# Patient Record
Sex: MALE | Race: BLACK OR AFRICAN AMERICAN | NOT HISPANIC OR LATINO | Employment: OTHER | URBAN - METROPOLITAN AREA
[De-identification: names, ages, dates, MRNs, and addresses within clinical notes are randomized per-mention and may not be internally consistent; named-entity substitution may affect disease eponyms.]

---

## 2017-03-09 DIAGNOSIS — F51.01 PRIMARY INSOMNIA: ICD-10-CM

## 2017-03-09 NOTE — TELEPHONE ENCOUNTER
Routing refill request to provider for review/approval because:  Labs not current:      Yumiko Garza RN  [facility]  [phone]

## 2017-03-09 NOTE — LETTER
[facility]  [address] [address]  [phone]        April 6, 2017  Ed Rudolph  [address] [address]    Dear Ed,    I care about your health and have reviewed your health plan. I have reviewed your medical conditions, medication list, and lab results and am making recommendations based on this review, to better manage your health.    You are in particular need of attention regarding:  -Wellness (Physical) Visit     I am recommending that you:  -schedule a WELLNESS (Physical) APPOINTMENT with me.   I will check fasting labs the same day - nothing to eat except water and meds for 8-10 hours prior.    Here is a list of Health Maintenance topics that are due now or due soon:  Health Maintenance Due   Topic Date Due     Discuss Advance Directive Planning  05/07/1979     Colon Cancer Screening - every 10 years.  05/07/2011     Cholesterol Lab - yearly  02/10/2016     Prostate Test (PSA) - every 2 years  04/16/2016     Comprehensive Metabolic Lab - yearly  12/21/2016       Please call us at [phone] (or use [employer] to address the above recommendations.     Thank you for trusting [facility] and we appreciate the opportunity to serve you.  We look forward to supporting your healthcare needs in the future.    Healthy Regards,    Kedar Pierce PA-C

## 2017-03-09 NOTE — TELEPHONE ENCOUNTER
Routing to team to inform and assist in scheduling.   Debora Erazo RN   [facility] - Triage

## 2017-03-09 NOTE — TELEPHONE ENCOUNTER
Trazodone       Last Written Prescription Date: 3/28/16  Last Fill Quantity: 90; # refills: 1  Last Office Visit with [facility], [facility] or  Health prescribing provider:  3/28/16        Last PHQ-9 score on record=   PHQ-9 SCORE 1/22/2016   Total Score 3       Lab Results   Component Value Date    AST 38 12/21/2015     Lab Results   Component Value Date    ALT 60 12/21/2015     Bethany Salazar CMA

## 2017-04-06 RX ORDER — TRAZODONE HYDROCHLORIDE 50 MG/1
TABLET, FILM COATED ORAL
Qty: 90 TABLET | Refills: 0 | OUTPATIENT
Start: 2017-04-06

## 2017-04-08 ENCOUNTER — OFFICE VISIT (OUTPATIENT)
Dept: URGENT CARE | Facility: URGENT CARE | Age: 57
End: 2017-04-08
Payer: COMMERCIAL

## 2017-04-08 VITALS
HEART RATE: 95 BPM | OXYGEN SATURATION: 96 % | SYSTOLIC BLOOD PRESSURE: 117 MMHG | WEIGHT: 191 LBS | TEMPERATURE: 98.2 F | BODY MASS INDEX: 27.41 KG/M2 | DIASTOLIC BLOOD PRESSURE: 73 MMHG

## 2017-04-08 DIAGNOSIS — J20.9 ACUTE BRONCHITIS, UNSPECIFIED ORGANISM: ICD-10-CM

## 2017-04-08 DIAGNOSIS — R05.9 COUGH: Primary | ICD-10-CM

## 2017-04-08 LAB
FLUAV+FLUBV AG SPEC QL: NEGATIVE
FLUAV+FLUBV AG SPEC QL: NORMAL
SPECIMEN SOURCE: NORMAL

## 2017-04-08 PROCEDURE — 99213 OFFICE O/P EST LOW 20 MIN: CPT | Performed by: FAMILY MEDICINE

## 2017-04-08 PROCEDURE — 87804 INFLUENZA ASSAY W/OPTIC: CPT | Performed by: FAMILY MEDICINE

## 2017-04-08 RX ORDER — AMOXICILLIN 500 MG/1
1000 CAPSULE ORAL 2 TIMES DAILY
Qty: 40 CAPSULE | Refills: 0 | Status: SHIPPED | OUTPATIENT
Start: 2017-04-08 | End: 2017-04-18

## 2017-04-08 NOTE — PROGRESS NOTES
SUBJECTIVE:   Ed Rudolph is a 56 year old male presenting with a chief complaint of fever, cough  and sore throat.  Onset of symptoms was 4 day(s) ago.  Course of illness is worsening.    Severity moderate  Current and Associated symptoms: fever, cough  and sore throat  Treatment measures tried include OTC meds.  Predisposing factors include None.    Past Medical History:   Diagnosis Date     Carpal tunnel syndrome 1/5/2007     Depressive disorder      Erectile Dysfunction 1/5/2007     Hyperlipidemia LDL goal <130 10/31/2010     Hypertension goal BP (blood pressure) < 140/90 5/11/2011     Impaired fasting glucose      LEFT FRONTAL LOBE ABSCESS 1/03    treated surgically, resolved     Current Outpatient Prescriptions   Medication Sig Dispense Refill     simvastatin (ZOCOR) 40 MG tablet Take 1 tablet (40 mg) by mouth At Bedtime 30 tablet 1     lisinopril-hydrochlorothiazide (PRINZIDE,ZESTORETIC) 20-25 MG per tablet Take 1 tablet by mouth daily 90 tablet 3     Multiple Vitamin (DAILY VITAMINS PO) Take 1 tablet by mouth daily        traZODone (DESYREL) 50 MG tablet Take 1 tablet (50 mg) by mouth At Bedtime (Patient not taking: Reported on 4/8/2017) 90 tablet 1     Social History   Substance Use Topics     Smoking status: Never Smoker     Smokeless tobacco: Never Used     Alcohol use 0.0 oz/week     0 Standard drinks or equivalent per week      Comment: currently drionking about a pint of vodka every day       ROS:  Review of systems negative except as stated above.    OBJECTIVE  :/73 (BP Location: Left arm, Patient Position: Chair, Cuff Size: Adult Large)  Pulse 95  Temp 98.2  F (36.8  C) (Oral)  Wt 191 lb (86.6 kg)  SpO2 96%  BMI 27.41 kg/m2  GENERAL APPEARANCE: healthy, alert and no distress  EYES: EOMI,  PERRL, conjunctiva clear  HENT: ear canals and TM's normal.  Nose and mouth without ulcers, erythema or lesions  NECK: supple, nontender, no lymphadenopathy  RESP: lungs clear to auscultation - no  rales, rhonchi or wheezes  NEURO: Normal strength and tone, sensory exam grossly normal,  normal speech and mentation  SKIN: no suspicious lesions or rashes    ASSESSMENT:  Bronchitis    PLAN:  Rx amox  See orders in Epic

## 2017-04-08 NOTE — NURSING NOTE
"Chief Complaint   Patient presents with     Cough     Pt c/o cough, fever (did not take), shakes, loss of energy, loss of appetite.        Initial /73 (BP Location: Left arm, Patient Position: Chair, Cuff Size: Adult Large)  Pulse 95  Temp 98.2  F (36.8  C) (Oral)  Wt 191 lb (86.6 kg)  SpO2 96%  BMI 27.41 kg/m2 Estimated body mass index is 27.41 kg/(m^2) as calculated from the following:    Height as of 5/2/16: 5' 10\" (1.778 m).    Weight as of this encounter: 191 lb (86.6 kg).  Medication Reconciliation: complete     Hiwot Mcnamara CMA (AAMA)      "

## 2017-04-08 NOTE — LETTER
[facility]  [address]  [place] [address]  [phone]      4/8/2017    Re: Ed Rudolph      TO WHOM IT MAY CONCERN:    Ed Rudolph  was seen on 04/08/2017.  Please excuse him 4/5/17 to 4/10/17 due to illness.    Cordially    Delvin Martin MD  [facility]

## 2017-04-08 NOTE — MR AVS SNAPSHOT
After Visit Summary   4/8/2017    Ed Rudolph    MRN: [id number]           Patient Information     Date Of Birth          1960        Visit Information        Provider Department      4/8/2017 9:10 AM Delvin Martin MD [facility]        Today's Diagnoses     Cough    -  1    Acute bronchitis, unspecified organism          Care Instructions      Bronchitis, Antibiotic Treatment (Adult)    Bronchitis is an infection of the air passages (bronchial tubes) in your lungs. It often occurs when you have a cold. This illness is contagious during the first few days and is spread through the air by coughing and sneezing, or by direct contact (touching the sick person and then touching your own eyes, nose, or mouth).  Symptoms of bronchitis include cough with mucus (phlegm) and low-grade fever. Bronchitis usually lasts 7 to 14 days. Mild cases can be treated with simple home remedies. More severe infection is treated with an antibiotic.  Home care  Follow these guidelines when caring for yourself at home:    If your symptoms are severe, rest at home for the first 2 to 3 days. When you go back to your usual activities, don't let yourself get too tired.    Do not smoke. Also avoid being exposed to secondhand smoke.    You may use over-the-counter medicines to control fever or pain, unless another medicine was prescribed. (Note: If you have chronic liver or kidney disease or have ever had a stomach ulcer or gastrointestinal bleeding, talk with your healthcare provider before using these medicines. Also talk to your provider if you are taking medicine to prevent blood clots.) Aspirin should never be given to anyone younger than 18 years of age who is ill with a viral infection or fever. It may cause severe liver or brain damage.    Your appetite may be poor, so a light diet is fine. Avoid dehydration by drinking 6 to 8 glasses of fluids per day (such as water, soft drinks, sports  drinks, juices, tea, or soup). Extra fluids will help loosen secretions in the nose and lungs.    Over-the-counter cough, cold, and sore-throat medicines will not shorten the length of the illness, but they may be helpful to reduce symptoms. (Note: Do not use decongestants if you have high blood pressure.)    Finish all antibiotic medicine. Do this even if you are feeling better after only a few days.  Follow-up care  Follow up with your healthcare provider, or as advised. If you had an X-ray or ECG (electrocardiogram), a specialist will review it. You will be notified of any new findings that may affect your care.  Note: If you are age 65 or older, or if you have a chronic lung disease or condition that affects your immune system, or you smoke, talk to your healthcare provider about having pneumococcal vaccinations and a yearly influenza vaccination (flu shot).  When to seek medical advice  Call your healthcare provider right away if any of these occur:    Fever of 100.4 F (38 C) or higher    Coughing up increased amounts of colored sputum    Weakness, drowsiness, headache, facial pain, ear pain, or a stiff neck   Call 911, or get immediate medical care  Contact emergency services right away if any of these occur.    Coughing up blood    Worsening weakness, drowsiness, headache, or stiff neck    Trouble breathing, wheezing, or pain with breathing    5142-2696 [employer]. [address] [place] [address]. All rights reserved. This information is not intended as a substitute for professional medical care. Always follow your healthcare professional's instructions.              Follow-ups after your visit        Your next 10 appointments already scheduled     Apr 10, 2017  4:20 PM CDT   Office Visit with Mariela Lyons MD   [facility] ([facility])    [address] [place] [address] [phone]           Bring a current list of  meds and any records pertaining to this visit.  For Physicals, please bring immunization records and any forms needing to be filled out.  Please arrive 10 minutes early to complete paperwork.              Who to contact     If you have questions or need follow up information about today's clinic visit or your schedule please contact [facility] [place] directly at [phone].  Normal or non-critical lab and imaging results will be communicated to you by [employer], letter or phone within 4 business days after the clinic has received the results. If you do not hear from us within 7 days, please contact the clinic through [employer] or phone. If you have a critical or abnormal lab result, we will notify you by phone as soon as possible.  Submit refill requests through [employer] or call your pharmacy and they will forward the refill request to us. Please allow 3 business days for your refill to be completed.          Additional Information About Your Visit        [employer] Information     [employer] gives you secure access to your electronic health record. If you see a primary care provider, you can also send messages to your care team and make appointments. If you have questions, please call your primary care clinic.  If you do not have a primary care provider, please call [phone] and they will assist you.        Care EveryWhere ID     This is your Care EveryWhere ID. This could be used by other organizations to access your [facility] medical records  [id number]        Your Vitals Were     Pulse Temperature Pulse Oximetry BMI (Body Mass Index)          95 98.2  F (36.8  C) (Oral) 96% 27.41 kg/m2         Blood Pressure from Last 3 Encounters:   04/08/17 117/73   05/02/16 117/75   03/28/16 122/84    Weight from Last 3 Encounters:   04/08/17 191 lb (86.6 kg)   05/02/16 207 lb (93.9 kg)   03/28/16 207 lb (93.9 kg)              We Performed the Following     Influenza A/B antigen          Today's Medication Changes           These changes are accurate as of: 4/8/17 10:26 AM.  If you have any questions, ask your nurse or doctor.               Start taking these medicines.        Dose/Directions    amoxicillin 500 MG capsule   Commonly known as:  AMOXIL   Used for:  Acute bronchitis, unspecified organism   Started by:  Delvin Martin MD        Dose:  1000 mg   Take 2 capsules (1,000 mg) by mouth 2 times daily for 10 days   Quantity:  40 capsule   Refills:  0            Where to get your medicines      These medications were sent to [facility] [address] [facility] [place], [place] - [address]  [address] [place] [address]     Phone:  [phone]     amoxicillin 500 MG capsule                Primary Care Provider Office Phone #    [facility] [phone]       No address on file        Thank you!     Thank you for choosing [facility]  for your care. Our goal is always to provide you with excellent care. Hearing back from our patients is one way we can continue to improve our services. Please take a few minutes to complete the written survey that you may receive in the mail after your visit with us. Thank you!             Your Updated Medication List - Protect others around you: Learn how to safely use, store and throw away your medicines at www.disposemymeds.org.          This list is accurate as of: 4/8/17 10:26 AM.  Always use your most recent med list.                   Brand Name Dispense Instructions for use    amoxicillin 500 MG capsule    AMOXIL    40 capsule    Take 2 capsules (1,000 mg) by mouth 2 times daily for 10 days       DAILY VITAMINS PO      Take 1 tablet by mouth daily       lisinopril-hydrochlorothiazide 20-25 MG per tablet    PRINZIDE/ZESTORETIC    90 tablet    Take 1 tablet by mouth daily       simvastatin 40 MG tablet    ZOCOR    30 tablet    Take 1 tablet (40 mg) by mouth At Bedtime       traZODone 50 MG tablet    DESYREL    90 tablet    Take 1 tablet (50 mg) by  mouth At Bedtime

## 2017-04-08 NOTE — PATIENT INSTRUCTIONS
Bronchitis, Antibiotic Treatment (Adult)    Bronchitis is an infection of the air passages (bronchial tubes) in your lungs. It often occurs when you have a cold. This illness is contagious during the first few days and is spread through the air by coughing and sneezing, or by direct contact (touching the sick person and then touching your own eyes, nose, or mouth).  Symptoms of bronchitis include cough with mucus (phlegm) and low-grade fever. Bronchitis usually lasts 7 to 14 days. Mild cases can be treated with simple home remedies. More severe infection is treated with an antibiotic.  Home care  Follow these guidelines when caring for yourself at home:    If your symptoms are severe, rest at home for the first 2 to 3 days. When you go back to your usual activities, don't let yourself get too tired.    Do not smoke. Also avoid being exposed to secondhand smoke.    You may use over-the-counter medicines to control fever or pain, unless another medicine was prescribed. (Note: If you have chronic liver or kidney disease or have ever had a stomach ulcer or gastrointestinal bleeding, talk with your healthcare provider before using these medicines. Also talk to your provider if you are taking medicine to prevent blood clots.) Aspirin should never be given to anyone younger than 18 years of age who is ill with a viral infection or fever. It may cause severe liver or brain damage.    Your appetite may be poor, so a light diet is fine. Avoid dehydration by drinking 6 to 8 glasses of fluids per day (such as water, soft drinks, sports drinks, juices, tea, or soup). Extra fluids will help loosen secretions in the nose and lungs.    Over-the-counter cough, cold, and sore-throat medicines will not shorten the length of the illness, but they may be helpful to reduce symptoms. (Note: Do not use decongestants if you have high blood pressure.)    Finish all antibiotic medicine. Do this even if you are feeling better after only a  few days.  Follow-up care  Follow up with your healthcare provider, or as advised. If you had an X-ray or ECG (electrocardiogram), a specialist will review it. You will be notified of any new findings that may affect your care.  Note: If you are age 65 or older, or if you have a chronic lung disease or condition that affects your immune system, or you smoke, talk to your healthcare provider about having pneumococcal vaccinations and a yearly influenza vaccination (flu shot).  When to seek medical advice  Call your healthcare provider right away if any of these occur:    Fever of 100.4 F (38 C) or higher    Coughing up increased amounts of colored sputum    Weakness, drowsiness, headache, facial pain, ear pain, or a stiff neck   Call 911, or get immediate medical care  Contact emergency services right away if any of these occur.    Coughing up blood    Worsening weakness, drowsiness, headache, or stiff neck    Trouble breathing, wheezing, or pain with breathing    0904-4192 [employer]. [address] [place] [address], [place] [address]. All rights reserved. This information is not intended as a substitute for professional medical care. Always follow your healthcare professional's instructions.

## 2017-06-05 DIAGNOSIS — I10 HYPERTENSION GOAL BP (BLOOD PRESSURE) < 140/90: ICD-10-CM

## 2017-06-05 RX ORDER — LISINOPRIL AND HYDROCHLOROTHIAZIDE 20; 25 MG/1; MG/1
1 TABLET ORAL DAILY
Qty: 30 TABLET | Refills: 0 | Status: SHIPPED | OUTPATIENT
Start: 2017-06-05 | End: 2017-07-27

## 2017-06-05 NOTE — TELEPHONE ENCOUNTER
lisinopril  Last Written Prescription Date: 3/15/2017  Last Fill Quantity: 90, # refills: 3  Last Office Visit with [facility], [facility] or [facility] prescribing provider: 3/28/2016  Next 5 appointments (look out 90 days)     Ric 15, 2017  6:00 PM CDT   Office Visit with Kedar Pierce PA-C   [facility] ([facility]    [address] [place] [id number] [phone]                   Potassium   Date Value Ref Range Status   12/22/2015 4.0 3.4 - 5.3 mmol/L Final     Creatinine   Date Value Ref Range Status   12/22/2015 0.85 0.66 - 1.25 mg/dL Final     BP Readings from Last 3 Encounters:   04/08/17 117/73   05/02/16 117/75   03/28/16 122/84

## 2017-07-03 DIAGNOSIS — I10 HYPERTENSION GOAL BP (BLOOD PRESSURE) < 140/90: ICD-10-CM

## 2017-07-03 NOTE — TELEPHONE ENCOUNTER
Prinzide      Last Written Prescription Date: 6/5/17  Last Fill Quantity: 30, # refills: 0  Last Office Visit with [facility] or [facility] prescribing provider: 3/28/16       Potassium   Date Value Ref Range Status   12/22/2015 4.0 3.4 - 5.3 mmol/L Final     Creatinine   Date Value Ref Range Status   12/22/2015 0.85 0.66 - 1.25 mg/dL Final     BP Readings from Last 3 Encounters:   04/08/17 117/73   05/02/16 117/75   03/28/16 122/84     Bethany Salazar CMA

## 2017-07-05 RX ORDER — LISINOPRIL AND HYDROCHLOROTHIAZIDE 20; 25 MG/1; MG/1
TABLET ORAL
Qty: 30 TABLET | Refills: 0 | OUTPATIENT
Start: 2017-07-05

## 2017-07-05 NOTE — TELEPHONE ENCOUNTER
Routing refill request to provider for review/approval because:  Lyssa given x1 and patient did not follow up, please advise  Patient needs to be seen because it has been more than 1 year since last office visit.  Routed to both provider and TC.  Radha Blankenship RN

## 2017-07-27 ENCOUNTER — OFFICE VISIT (OUTPATIENT)
Dept: FAMILY MEDICINE | Facility: CLINIC | Age: 57
End: 2017-07-27
Payer: COMMERCIAL

## 2017-07-27 VITALS
HEIGHT: 70 IN | WEIGHT: 201 LBS | OXYGEN SATURATION: 95 % | DIASTOLIC BLOOD PRESSURE: 83 MMHG | SYSTOLIC BLOOD PRESSURE: 133 MMHG | HEART RATE: 91 BPM | TEMPERATURE: 98.6 F | BODY MASS INDEX: 28.77 KG/M2

## 2017-07-27 DIAGNOSIS — E78.5 HYPERLIPIDEMIA LDL GOAL <130: ICD-10-CM

## 2017-07-27 DIAGNOSIS — F33.0 MAJOR DEPRESSIVE DISORDER, RECURRENT EPISODE, MILD (H): ICD-10-CM

## 2017-07-27 DIAGNOSIS — M79.89 FOOT SWELLING: Primary | ICD-10-CM

## 2017-07-27 DIAGNOSIS — F51.01 PRIMARY INSOMNIA: ICD-10-CM

## 2017-07-27 DIAGNOSIS — Z12.11 SCREEN FOR COLON CANCER: ICD-10-CM

## 2017-07-27 DIAGNOSIS — N40.2 PROSTATIC NODULE: ICD-10-CM

## 2017-07-27 DIAGNOSIS — I10 ESSENTIAL HYPERTENSION WITH GOAL BLOOD PRESSURE LESS THAN 140/90: ICD-10-CM

## 2017-07-27 PROCEDURE — 99214 OFFICE O/P EST MOD 30 MIN: CPT | Performed by: FAMILY MEDICINE

## 2017-07-27 RX ORDER — TRAZODONE HYDROCHLORIDE 50 MG/1
50 TABLET, FILM COATED ORAL AT BEDTIME
Qty: 90 TABLET | Refills: 1 | Status: SHIPPED | OUTPATIENT
Start: 2017-07-27 | End: 2018-02-06

## 2017-07-27 RX ORDER — LISINOPRIL AND HYDROCHLOROTHIAZIDE 20; 25 MG/1; MG/1
1 TABLET ORAL DAILY
Qty: 90 TABLET | Refills: 1 | Status: SHIPPED | OUTPATIENT
Start: 2017-07-27 | End: 2018-02-06

## 2017-07-27 RX ORDER — SIMVASTATIN 40 MG
40 TABLET ORAL EVERY EVENING
Qty: 30 TABLET | Refills: 1 | Status: SHIPPED | OUTPATIENT
Start: 2017-07-27 | End: 2017-09-15

## 2017-07-27 NOTE — NURSING NOTE
"Chief Complaint   Patient presents with     Musculoskeletal Problem     right foot swelling     Establish Care       Initial /83 (BP Location: Left arm, Patient Position: Chair, Cuff Size: Adult Regular)  Pulse 91  Temp 98.6  F (37  C) (Oral)  Ht 5' 9.5\" (1.765 m)  Wt 201 lb (91.2 kg)  SpO2 95%  BMI 29.26 kg/m2 Estimated body mass index is 29.26 kg/(m^2) as calculated from the following:    Height as of this encounter: 5' 9.5\" (1.765 m).    Weight as of this encounter: 201 lb (91.2 kg).  Medication Reconciliation: complete     Pa Willie Mederos MA      "

## 2017-07-27 NOTE — PATIENT INSTRUCTIONS
This summary includes the important diagnoses, test, medications and other important parts of your medical history.  Below are a few good we sites you can use to learn more about these.     Www.Tripbod.org : Up to date and easily searchable information on multiple topics.  Www.Tripbod.org/Pharmacy/c_[email or web address].asp : [facility] $4.99 medications  Www.[employer].gov : medication info, interactive tutorials, watch real surgeries online  Www.familydoctor.org : good info from the Academy of Family Physicians  Www.Ykoneinic.com : good info from the [facility]  Www.cdc.gov : public health info, travel advisories, epidemics (H1N1)  Www.aap.org : children's health info, normal development, vaccinations  Www.health.[facility].[place].us : [place] dept of heat, public health issues in [place], N1N1    Based on your medical history and these are the current health maintenance or preventive care services that you are due for (some may have been done at this visit:)  Health Maintenance Due   Topic Date Due     DEPRESSION ACTION PLAN Q1 YR  05/07/1979     COLON CANCER SCREEN (SYSTEM ASSIGNED)  05/07/2011     LIPID MONITORING Q1 YEAR  02/10/2016     PSA Q2 YR  04/16/2016     ADVANCE DIRECTIVE PLANNING Q5 YRS  05/07/2016     PHQ-9 Q6 MONTHS  07/22/2016     CMP Q1 YR  12/21/2016     =================================================================================  Normal Values   Blood pressure  <140/90 for most adults    <130/80 for some chronic diseases (ask your care team about yours)    BMI (body mass index)  18.5-25 kg/m2 (based on height and weight)     Thank you for visiting [facility]    Normal or non-critical lab and imaging results will be communicated to you by MyChart, letter or phone within 7 days.  If you do not hear from us within 10 days, please call the clinic. If you have a critical or abnormal lab result, we will notify you by phone as soon as possible.     If you have any questions regarding your  visit please contact:     Team Comfort:   Clinic Hours Telephone Number   Dr. Aris Chase   7am-5pm  Monday - Friday [phone]  Mars RN   Pharmacy 8am-8pm Monday-Thursday      8am-6pm Friday  9am-5pm Saturday-Sunday [phone]   Urgent Care 11am-8pm Monday-Friday        9am-5pm Saturday-Sunday [phone]     After hours, weekend or if you need to make an appointment with your primary provider please call [phone].   After Hours nurse advise: call [facility]: [phone]    Medication Refills:  Call your pharmacy and they will forward the refill to us. Please allow 3 business days for your refills to be completed.    Use Mobile System 7 (secure email communication and access to your chart) to send your primary care provider a message or make an appointment. Ask someone on your Team how to sign up for Mobile System 7. To log on to [employer] or for more information in [employer] please visit the website at [email or web address].  As of October 8, 2013, all password changes, disabled accounts, or ID changes in Mobile System 7/MyHealth will be done by our Access Services Department.   If you need help with your account or password, call: [phone]. Clinic staff no longer has the ability to change passwords.     Colorectal Cancer Screening    Colorectal cancer (cancer in the colon or rectum) is a leading cause of cancer deaths in the [place] But it doesn t have to be. When this cancer is found and removed early, the chances of a full recovery are very good. Because colorectal cancer rarely causes symptoms in its early stages, screening for the disease is important. It s even more crucial if you have risk factors for the disease. Learn more about colorectal cancer and its risk factors. Then talk to your healthcare provider about being screened. You could be saving your own life.  Risk factors for colorectal cancer  Your risk of having  colorectal cancer increases if you:    Are 50 years of age or older    Have a family history or personal history of colorectal cancer or polyps    Have a personal history of type 2 diabetes, Crohn s disease, or ulcerative colitis    Have an inherited genetic syndrome like Ingram syndrome (also known as HNPCC) or familial adenomatous polyposis (FAP)    Are very overweight    Are not physically active    Smoke    Drink a lot of alcohol    Eat a lot of red or processed meat  The colon and rectum  Waste from food you eat enters the colon from the small intestine. As it travels through the colon, the waste (stool) loses water and becomes more solid. Intestinal muscles push it toward the sigmoid--the last section of the colon. Stool then moves into the rectum, where it s stored until it s ready to leave the body during a bowel movement.  How cancer develops  Polyps are growths that form on the inner lining of the colon or rectum. Most are benign, which means they aren t cancerous. But over time, some polyps can become cancer (malignant). This happens when cells in these polyps begin growing abnormally. In time, malignant cells invade more and more of the colon and rectum. The cancer may also spread to nearby organs or lymph nodes or to other parts of the body. Finding and removing polyps can help prevent cancer from ever forming.  Your screening  Screening means looking for a health problem before you have symptoms. During screening for colorectal cancer, your healthcare provider will ask about your health history, examine you, and do one or more tests.  History and exam  The history and exam involve the following:    Health history. Your healthcare provider will ask about your health history. Mention if a family member has had colon cancer or polyps. Also mention any health problems you have had in the past.    Digital rectal exam (WAYNE). During a WAYNE, the healthcare provider inserts a lubricated gloved finger into the  rectum. The test is painless and takes less than a minute. Healthcare providers agree that this test alone is not enough to screen for colorectal cancer.  Screening test choices  Fecal occult blood test (FOBT) or fecal immunochemical test (FIT)  These tests check for occult blood in stool (blood you can t see). Hidden blood may be a sign of colon polyps or cancer. A small sample of stool is tested for blood in a laboratory. Most often, you collect this sample at home using a kit your healthcare provider gives you. Follow the instructions carefully for using this kit. You might need to avoid certain foods and medicines before the test, as directed.  Barium enema with contrast (double-contrast barium enema)  This test uses X-rays to provide images of the entire colon and rectum. The day before this test, you will need to do a bowel prep to clean out the colon and rectum. A bowel prep is a liquid diet plus strong laxatives or enemas. You will be awake for the test, but you may be given medicine to help you relax. At the start of the test, a radiologist (a healthcare provider who specializes in imaging tests) places a soft tube into the rectum. The tube is used to fill the colon with a contrast liquid (barium) and air. This can be uncomfortable for some people. The liquid helps the colon show up clearly on the X-rays. Because the test uses X-rays, it exposes you to a small amount of radiation.  Virtual colonoscopy  This exam is also called a CT colonography. It uses a series of X-ray photographs to create a 3-D view of the colon and rectum. The day before the test, you will need to do a bowel prep to clean out your colon. Your healthcare provider will give you instructions on how to do this. During the procedure, you will lie on a table that is part of a special X-ray machine called a CT scanner. A small tube will be placed into your rectum to fill the colon and rectum with air. This can be uncomfortable for some  people. Then, the table will move into the machine and pictures will be taken of your colon and rectum. A computer will combine these photos to create a 3-D picture. Because the test uses X-rays, it exposes you to a small amount of radiation.  Scope exams  Here are two types of scope exams:    Colonoscopy. This test can be used to find and remove polyps anywhere in the colon or rectum. The day before the test, you will do a bowel prep. This is a liquid diet plus a strong laxative solution or an enema. The bowel prep will cleanse your colon. You will be given instructions for this. Just before the test, you are given a medicine to make you sleepy. Then, a long, flexible, lighted tube called a colonoscope is gently inserted into the rectum and guided through the entire colon. Images of the colon are viewed on a video screen. Any polyps that are found are removed and sent to a lab for testing. If a polyp can t be removed, a sample of tissue is taken and the polyp might be removed later during surgery. You will need to bring someone with you to drive you home after this test.     Sigmoidoscopy. This test is similar to colonoscopy, but focuses only on the sigmoid colon and rectum. As with colonoscopy, bowel prep must be done the day before this test. It might not need to be as complete as the bowel prep for a colonoscopy. You are awake during the procedure, but you may be given medicine to help you relax. During the test, the healthcare provider guides a thin, flexible, lighted tube called a sigmoidoscope through your rectum and lower colon. The images are displayed on a video screen. Polyps are removed, if possible, and sent to a lab for testing.  Colonoscopy is the only screening test that lets your healthcare provider see the entire colon and rectum. This test also lets your healthcare provider remove any pieces of tissue that need to be looked at by a lab. If something suspicious is found using any other tests, you  will likely need a colonoscopy.     When to call your healthcare provider after a test  Call your healthcare provider if you have any of the following after any screening test:    Bleeding    Fever of 100.4 F (38 C) or higher, or as directed by your healthcare provider    Abdominal pain    Vomiting   Date Last Reviewed: 11/4/2015 2000-2017 [employer]@. [address] [place] [address], [place] [address]. All rights reserved. This information is not intended as a substitute for professional medical care. Always follow your healthcare professional's instructions.

## 2017-07-27 NOTE — LETTER
My Depression Action Plan  Name: Ed Rudolph   Date of Birth 1960  Date: 7/27/2017    My doctor: Anna Chu   My clinic: [facility] [id number] [phone]          GREEN    ZONE   Good Control    What it looks like:     Things are going generally well. You have normal up s and down s. You may even feel depressed from time to time, but bad moods usually last less than a day.   What you need to do:  1. Continue to care for yourself (see self care plan)  2. Check your depression survival kit and update it as needed  3. Follow your physician s recommendations including any medication.  4. Do not stop taking medication unless you consult with your physician first.           YELLOW         ZONE Getting Worse    What it looks like:     Depression is starting to interfere with your life.     It may be hard to get out of bed; you may be starting to isolate yourself from others.    Symptoms of depression are starting to last most all day and this has happened for several days.     You may have suicidal thoughts but they are not constant.   What you need to do:     1. Call your care team, your response to treatment will improve if you keep your care team informed of your progress. Yellow periods are signs an adjustment may need to be made.     2. Continue your self-care, even if you have to fake it!    3. Talk to someone in your support network    4. Open up your depression survival kit           RED    ZONE Medical Alert - Get Help    What it looks like:     Depression is seriously interfering with your life.     You may experience these or other symptoms: You can t get out of bed most days, can t work or engage in other necessary activities, you have trouble taking care of basic hygiene, or basic responsibilities, thoughts of suicide or death that will not go away, self-injurious behavior.     What you need to do:  1. Call  your care team and request a same-day appointment. If they are not available (weekends or after hours) call your local crisis line, emergency room or 911.      Electronically signed by: Amira Hassan, July 27, 2017    Depression Self Care Plan / Survival Kit    Self-Care for Depression  Here s the deal. Your body and mind are really not as separate as most people think.  What you do and think affects how you feel and how you feel influences what you do and think. This means if you do things that people who feel good do, it will help you feel better.  Sometimes this is all it takes.  There is also a place for medication and therapy depending on how severe your depression is, so be sure to consult with your medical provider and/ or Behavioral Health Consultant if your symptoms are worsening or not improving.     In order to better manage my stress, I will:    Exercise  Get some form of exercise, every day. This will help reduce pain and release endorphins, the  feel good  chemicals in your brain. This is almost as good as taking antidepressants!  This is not the same as joining a gym and then never going! (they count on that by the way ) It can be as simple as just going for a walk or doing some gardening, anything that will get you moving.      Hygiene   Maintain good hygiene (Get out of bed in the morning, Make your bed, Brush your teeth, Take a shower, and Get dressed like you were going to work, even if you are unemployed).  If your clothes don't fit try to get ones that do.    Diet  I will strive to eat foods that are good for me, drink plenty of water, and avoid excessive sugar, caffeine, alcohol, and other mood-altering substances.  Some foods that are helpful in depression are: complex carbohydrates, B vitamins, flaxseed, fish or fish oil, fresh fruits and vegetables.    Psychotherapy  I agree to participate in Individual Therapy (if recommended).    Medication  If prescribed medications, I agree to take  them.  Missing doses can result in serious side effects.  I understand that drinking alcohol, or other illicit drug use, may cause potential side effects.  I will not stop my medication abruptly without first discussing it with my provider.    Staying Connected With Others  I will stay in touch with my friends, family members, and my primary care provider/team.    Use your imagination  Be creative.  We all have a creative side; it doesn t matter if it s oil painting, sand castles, or mud pies! This will also kick up the endorphins.    Witness Beauty  (AKA stop and smell the roses) Take a look outside, even in mid-winter. Notice colors, textures. Watch the squirrels and birds.     Service to others  Be of service to others.  There is always someone else in need.  By helping others we can  get out of ourselves  and remember the really important things.  This also provides opportunities for practicing all the other parts of the program.    Humor  Laugh and be silly!  Adjust your TV habits for less news and crime-drama and more comedy.    Control your stress  Try breathing deep, massage therapy, biofeedback, and meditation. Find time to relax each day.     My support system    Clinic Contact:  Phone number:    Contact 1:  Phone number:    Contact 2:  Phone number:    Druze/:  Phone number:    Therapist:  Phone number:    Local crisis center:    Phone number:    Other community support:  Phone number:

## 2017-07-27 NOTE — PROGRESS NOTES
"  SUBJECTIVE:                                                    Ed Rudolph is a 57 year old male who presents to clinic today for the following health issues:    Joint     Onset: 1 week    Description:   Location: right foot  Character: no pain    Intensity: moderate    Progression of Symptoms: same    Accompanying Signs & Symptoms:  Other symptoms: swelling    History:   Previous similar pain: YES- when he doesn't take lisinopril      Precipitating factors:   Trauma or overuse: no     Alleviating factors:  Improved by: nothing    Therapies Tried and outcome: none    Patient states he's been cutting his lisinopril in quarters and only taking that dose. When he started doing this, he noticed the swelling start. When he hasn't taken his medications in the past, he had a similar issue occur.     He also hasn't taken his simvastatin in over a year.    Past medical, family, and social histories, medications, and allergies are reviewed and updated in Epic.     ROS:  C: NEGATIVE for fever, chills, change in weight  E/M: NEGATIVE for ear, mouth and throat problems  R: NEGATIVE for significant cough or SOB  CV: NEGATIVE for chest pain, palpitations or peripheral edema  MUSCULOSKELETAL: See above.  PSYCH: PHQ-9 = 6; REHAN-7 = 2  ROS otherwise negative    Any history above obtained by the Medical Assistant was reviewed by Dr. Aris Garcia MD, and edited when necessary.    This document serves as a record of the services and decisions personally performed and made by Dr. Garcia. It was created on his behalf by Sonya Collier, a trained medical scribe. The creation of this document is based the provider's statements to the medical scribe.  Sonya Collier July 27, 2017 5:50 PM     OBJECTIVE:                                                    /83 (BP Location: Left arm, Patient Position: Chair, Cuff Size: Adult Regular)  Pulse 91  Temp 98.6  F (37  C) (Oral)  Ht 1.765 m (5' 9.5\")  Wt 91.2 kg (201 lb)  SpO2 " 95%  BMI 29.26 kg/m2   Body mass index is 29.26 kg/(m^2).     GENERAL: healthy, alert and no distress  EYES: Eyes grossly normal to inspection, PERRL, EOMI, sclerae white and conjunctivae normal  MS:  Swelling around the ankle joint and 1+ pitting pre-tibial edema on the right side.   SKIN: no suspicious lesions or rashes  NEURO: Normal strength and tone, sensory exam grossly normal, mentation intact, oriented times 3 and cranial nerves 2-12 intact  PSYCH: mentation appears normal, affect normal/bright     Diagnostic Test Results:  none      ASSESSMENT/PLAN:                                                      (M79.89) Foot swelling  (primary encounter diagnosis)  Comment: Secondary to missing his diuretic. Since the right side is mainly affected, he likely has venous incompetence on that side as well.   Plan: Observe. Follow up as needed at next visit.     (I10) Essential hypertension with goal blood pressure less than 140/90  Comment: Well controlled.  Plan: lisinopril-hydrochlorothiazide         (PRINZIDE/ZESTORETIC) 20-25 MG per tablet,         Basic metabolic panel        Recheck in 6 months.    (F33.0) Major depressive disorder, recurrent episode, mild (H)  Comment: Stable.  Plan: DEPRESSION ACTION PLAN (DAP), traZODone (DESYREL) 50 MG tablet        Monitor periodically. I reminded him that his trazodone should help him with his depression.    (E78.5) Hyperlipidemia LDL goal <130  Comment: Not at goal at last check 2 years ago (LDL = 131). He has not taken simvastatin in about a year, so this will be a baseline set of values.   Plan: Lipid panel reflex to direct LDL, simvastatin         (ZOCOR) 40 MG tablet, ALT        Review results at [facility] in near future and recheck in 6 months.    (F51.01) Primary insomnia  Comment: Refill request.   Plan: traZODone (DESYREL) 50 MG tablet            (N40.2) Prostatic nodule  Comment: The patient does not recall this medical history. Periodic check of PSA was  recommended.  Plan: PROSTATE SPEC ANTIGEN SCREEN        Follow up as needed.     (Z12.11) Screen for colon cancer  Comment: Patient likely wants to do FIT for now.  Plan: GASTROENTEROLOGY ADULT REF PROCEDURE ONLY,         Fecal colorectal cancer screen FIT - Future         (S+30)        Handouts provided.      The information in this document, created by the medical scribe for me, accurately reflects the services I personally performed and the decisions made by me. I have reviewed and approved this document for accuracy prior to leaving the patient care area. July 27, 2017 5:50 PM   Aris Garcia MD

## 2017-07-27 NOTE — MR AVS SNAPSHOT
After Visit Summary   7/27/2017    Ed Rudolph    MRN: [id number]           Patient Information     Date Of Birth          1960        Visit Information        Provider Department      7/27/2017 5:40 PM Aris Garcia MD [facility]        Today's Diagnoses     Foot swelling    -  1    Essential hypertension with goal blood pressure less than 140/90        Major depressive disorder, recurrent episode, mild (H)        Hyperlipidemia LDL goal <130        Primary insomnia        Prostatic nodule        Screen for colon cancer          Care Instructions    This summary includes the important diagnoses, test, medications and other important parts of your medical history.  Below are a few good we sites you can use to learn more about these.     Www.Browsercast.com.[facility] : Up to date and easily searchable information on multiple topics.  Www.[email or web address].com.[facility]/[email or web address] : [facility] $4.99 medications  Www.Dodreams.gov : medication info, interactive tutorials, watch real surgeries online  Www.familydoctor.org : good info from the Academy of Family Physicians  Www.[facility].com : good info from the [facility]  Www.cdc.gov : public health info, travel advisories, epidemics (H1N1)  Www.aap.org : children's health info, normal development, vaccinations  Www.health.[facility].[place].us : [place] dept of heatlh, public health issues in [place], N1N1    Based on your medical history and these are the current health maintenance or preventive care services that you are due for (some may have been done at this visit:)  Health Maintenance Due   Topic Date Due     DEPRESSION ACTION PLAN Q1 YR  05/07/1979     COLON CANCER SCREEN (SYSTEM ASSIGNED)  05/07/2011     LIPID MONITORING Q1 YEAR  02/10/2016     PSA Q2 YR  04/16/2016     ADVANCE DIRECTIVE PLANNING Q5 YRS  05/07/2016     PHQ-9 Q6 MONTHS  07/22/2016     CMP Q1 YR  12/21/2016      =================================================================================  Normal Values   Blood pressure  <140/90 for most adults    <130/80 for some chronic diseases (ask your care team about yours)    BMI (body mass index)  18.5-25 kg/m2 (based on height and weight)     Thank you for visiting [facility]    Normal or non-critical lab and imaging results will be communicated to you by MyChart, letter or phone within 7 days.  If you do not hear from us within 10 days, please call the clinic. If you have a critical or abnormal lab result, we will notify you by phone as soon as possible.     If you have any questions regarding your visit please contact:     Team Comfort:   Clinic Hours Telephone Number   Dr. Aris Ivory   [place]   7am-5pm  Monday - Friday [phone]  [facility]   Pharmacy 8am-8pm Monday-Thursday      8am-6pm Friday  9am-5pm Saturday-Sunday [phone]   Urgent Care 11am-8pm Monday-Friday        9am-5pm Saturday-Sunday [phone]     After hours, weekend or if you need to make an appointment with your primary provider please call [phone].   After Hours nurse advise: call [facility]: [phone]    Medication Refills:  Call your pharmacy and they will forward the refill to us. Please allow 3 business days for your refills to be completed.    Use Fanbaset (secure email communication and access to your chart) to send your primary care provider a message or make an appointment. Ask someone on your Team how to sign up for Fanbaset. To log on to FINXI or for more information in Ventive please visit the website at [email or web address].  As of October 8, 2013, all password changes, disabled accounts, or ID changes in HeatGenie/MyHealth will be done by our Access Services Department.   If you need help with your account or password, call: [phone]. Clinic staff no longer has  the ability to change passwords.     Colorectal Cancer Screening    Colorectal cancer (cancer in the colon or rectum) is a leading cause of cancer deaths in the [place] But it doesn t have to be. When this cancer is found and removed early, the chances of a full recovery are very good. Because colorectal cancer rarely causes symptoms in its early stages, screening for the disease is important. It s even more crucial if you have risk factors for the disease. Learn more about colorectal cancer and its risk factors. Then talk to your healthcare provider about being screened. You could be saving your own life.  Risk factors for colorectal cancer  Your risk of having colorectal cancer increases if you:    Are 50 years of age or older    Have a family history or personal history of colorectal cancer or polyps    Have a personal history of type 2 diabetes, Crohn s disease, or ulcerative colitis    Have an inherited genetic syndrome like Ingram syndrome (also known as HNPCC) or familial adenomatous polyposis (FAP)    Are very overweight    Are not physically active    Smoke    Drink a lot of alcohol    Eat a lot of red or processed meat  The colon and rectum  Waste from food you eat enters the colon from the small intestine. As it travels through the colon, the waste (stool) loses water and becomes more solid. Intestinal muscles push it toward the sigmoid--the last section of the colon. Stool then moves into the rectum, where it s stored until it s ready to leave the body during a bowel movement.  How cancer develops  Polyps are growths that form on the inner lining of the colon or rectum. Most are benign, which means they aren t cancerous. But over time, some polyps can become cancer (malignant). This happens when cells in these polyps begin growing abnormally. In time, malignant cells invade more and more of the colon and rectum. The cancer may also spread to nearby organs or lymph nodes or to other parts of the body. Finding  and removing polyps can help prevent cancer from ever forming.  Your screening  Screening means looking for a health problem before you have symptoms. During screening for colorectal cancer, your healthcare provider will ask about your health history, examine you, and do one or more tests.  History and exam  The history and exam involve the following:    Health history. Your healthcare provider will ask about your health history. Mention if a family member has had colon cancer or polyps. Also mention any health problems you have had in the past.    Digital rectal exam (WAYNE). During a WAYNE, the healthcare provider inserts a lubricated gloved finger into the rectum. The test is painless and takes less than a minute. Healthcare providers agree that this test alone is not enough to screen for colorectal cancer.  Screening test choices  Fecal occult blood test (FOBT) or fecal immunochemical test (FIT)  These tests check for occult blood in stool (blood you can t see). Hidden blood may be a sign of colon polyps or cancer. A small sample of stool is tested for blood in a laboratory. Most often, you collect this sample at home using a kit your healthcare provider gives you. Follow the instructions carefully for using this kit. You might need to avoid certain foods and medicines before the test, as directed.  Barium enema with contrast (double-contrast barium enema)  This test uses X-rays to provide images of the entire colon and rectum. The day before this test, you will need to do a bowel prep to clean out the colon and rectum. A bowel prep is a liquid diet plus strong laxatives or enemas. You will be awake for the test, but you may be given medicine to help you relax. At the start of the test, a radiologist (a healthcare provider who specializes in imaging tests) places a soft tube into the rectum. The tube is used to fill the colon with a contrast liquid (barium) and air. This can be uncomfortable for some people. The  liquid helps the colon show up clearly on the X-rays. Because the test uses X-rays, it exposes you to a small amount of radiation.  Virtual colonoscopy  This exam is also called a CT colonography. It uses a series of X-ray photographs to create a 3-D view of the colon and rectum. The day before the test, you will need to do a bowel prep to clean out your colon. Your healthcare provider will give you instructions on how to do this. During the procedure, you will lie on a table that is part of a special X-ray machine called a CT scanner. A small tube will be placed into your rectum to fill the colon and rectum with air. This can be uncomfortable for some people. Then, the table will move into the machine and pictures will be taken of your colon and rectum. A computer will combine these photos to create a 3-D picture. Because the test uses X-rays, it exposes you to a small amount of radiation.  Scope exams  Here are two types of scope exams:    Colonoscopy. This test can be used to find and remove polyps anywhere in the colon or rectum. The day before the test, you will do a bowel prep. This is a liquid diet plus a strong laxative solution or an enema. The bowel prep will cleanse your colon. You will be given instructions for this. Just before the test, you are given a medicine to make you sleepy. Then, a long, flexible, lighted tube called a colonoscope is gently inserted into the rectum and guided through the entire colon. Images of the colon are viewed on a video screen. Any polyps that are found are removed and sent to a lab for testing. If a polyp can t be removed, a sample of tissue is taken and the polyp might be removed later during surgery. You will need to bring someone with you to drive you home after this test.     Sigmoidoscopy. This test is similar to colonoscopy, but focuses only on the sigmoid colon and rectum. As with colonoscopy, bowel prep must be done the day before this test. It might not need to  be as complete as the bowel prep for a colonoscopy. You are awake during the procedure, but you may be given medicine to help you relax. During the test, the healthcare provider guides a thin, flexible, lighted tube called a sigmoidoscope through your rectum and lower colon. The images are displayed on a video screen. Polyps are removed, if possible, and sent to a lab for testing.  Colonoscopy is the only screening test that lets your healthcare provider see the entire colon and rectum. This test also lets your healthcare provider remove any pieces of tissue that need to be looked at by a lab. If something suspicious is found using any other tests, you will likely need a colonoscopy.     When to call your healthcare provider after a test  Call your healthcare provider if you have any of the following after any screening test:    Bleeding    Fever of 100.4 F (38 C) or higher, or as directed by your healthcare provider    Abdominal pain    Vomiting   Date Last Reviewed: 11/4/2015 2000-2017 [employer]. [address] [place] [address]. All rights reserved. This information is not intended as a substitute for professional medical care. Always follow your healthcare professional's instructions.                Follow-ups after your visit        Additional Services     GASTROENTEROLOGY ADULT REF PROCEDURE ONLY       Last Lab Result: Creatinine (mg/dL)       Date                     Value                 12/22/2015               0.85             ----------  Body mass index is 29.26 kg/(m^2).     Needed:  No  Language:  English    Patient will be contacted to schedule procedure.     Please be aware that coverage of these services is subject to the terms and limitations of your health insurance plan.  Call member services at your health plan with any benefit or coverage questions.  Any procedures must be performed at a [facility] facility OR coordinated by your clinic's referral  office.    Please bring the following with you to your appointment:    (1) Any X-Rays, CTs or MRIs which have been performed.  Contact the facility where they were done to arrange for  prior to your scheduled appointment.    (2) List of current medications   (3) This referral request   (4) Any documents/labs given to you for this referral                  Future tests that were ordered for you today     Open Future Orders        Priority Expected Expires Ordered    Fecal colorectal cancer screen FIT - Future (S+30) Routine 8/17/2017 8/26/2017 7/27/2017    Basic metabolic panel Routine 8/3/2017 8/27/2017 7/27/2017    ALT Routine 8/3/2017 8/27/2017 7/27/2017    PROSTATE SPEC ANTIGEN SCREEN Routine 8/3/2017 8/27/2017 7/27/2017    Lipid panel reflex to direct LDL Routine 8/3/2017 8/27/2017 7/27/2017            Who to contact     If you have questions or need follow up information about today's clinic visit or your schedule please contact [facility] directly at [phone].  Normal or non-critical lab and imaging results will be communicated to you by Gema Touchhart, letter or phone within 4 business days after the clinic has received the results. If you do not hear from us within 7 days, please contact the clinic through Hashplext or phone. If you have a critical or abnormal lab result, we will notify you by phone as soon as possible.  Submit refill requests through [employer] or call your pharmacy and they will forward the refill request to us. Please allow 3 business days for your refill to be completed.          Additional Information About Your Visit        Gema Touchhart Information     [employer] gives you secure access to your electronic health record. If you see a primary care provider, you can also send messages to your care team and make appointments. If you have questions, please call your primary care clinic.  If you do not have a primary care provider, please call [phone] and they will assist  "you.        Care EveryWhere ID     This is your Care EveryWhere ID. This could be used by other organizations to access your [facility] medical records  [id number]        Your Vitals Were     Pulse Temperature Height Pulse Oximetry BMI (Body Mass Index)       91 98.6  F (37  C) (Oral) 5' 9.5\" (1.765 m) 95% 29.26 kg/m2        Blood Pressure from Last 3 Encounters:   07/27/17 133/83   04/08/17 117/73   05/02/16 117/75    Weight from Last 3 Encounters:   07/27/17 201 lb (91.2 kg)   04/08/17 191 lb (86.6 kg)   05/02/16 207 lb (93.9 kg)              We Performed the Following     DEPRESSION ACTION PLAN (DAP)     GASTROENTEROLOGY ADULT REF PROCEDURE ONLY          Today's Medication Changes          These changes are accurate as of: 7/27/17  6:09 PM.  If you have any questions, ask your nurse or doctor.               These medicines have changed or have updated prescriptions.        Dose/Directions    lisinopril-hydrochlorothiazide 20-25 MG per tablet   Commonly known as:  PRINZIDE/ZESTORETIC   This may have changed:  additional instructions   Used for:  Essential hypertension with goal blood pressure less than 140/90   Changed by:  Aris Garcia MD        Dose:  1 tablet   Take 1 tablet by mouth daily for blood pressure.   Quantity:  90 tablet   Refills:  1       simvastatin 40 MG tablet   Commonly known as:  ZOCOR   This may have changed:    - when to take this  - additional instructions   Used for:  Hyperlipidemia LDL goal <130   Changed by:  Aris Garcia MD        Dose:  40 mg   Take 1 tablet (40 mg) by mouth every evening for cholesterol.   Quantity:  30 tablet   Refills:  1       traZODone 50 MG tablet   Commonly known as:  DESYREL   This may have changed:  additional instructions   Used for:  Primary insomnia   Changed by:  Aris Garcia MD        Dose:  50 mg   Take 1 tablet (50 mg) by mouth At Bedtime for depression and sleep.   Quantity:  90 tablet   Refills:  1            Where to get your " medicines      These medications were [place] [facility] [place] - [address]  [address], [place] [address]     Phone:  [phone]     lisinopril-hydrochlorothiazide 20-25 MG per tablet    simvastatin 40 MG tablet    traZODone 50 MG tablet                Primary Care Provider Office Phone #    [facility] [phone]       No address on file        Equal Access to Services     DAMI JOHNS : Hadii aad ku hadasho Soomaali, waaxda luqadaha, qaybta kaalmada adeegyada, waxay idiin hayaan adeeg kharash laron . So [facility] [phone].    ATENCIÓN: Si habla español, tiene a reyes disposición servicios gratuitos de asistencia lingüística. [facility] [phone].    We comply with applicable federal civil rights laws and [place] laws. We do not discriminate on the basis of race, color, national origin, age, disability sex, sexual orientation or gender identity.            Thank you!     Thank you for choosing [facility]  for your care. Our goal is always to provide you with excellent care. Hearing back from our patients is one way we can continue to improve our services. Please take a few minutes to complete the written survey that you may receive in the mail after your visit with us. Thank you!             Your Updated Medication List - Protect others around you: Learn how to safely use, store and throw away your medicines at www.disposemymeds.org.          This list is accurate as of: 7/27/17  6:09 PM.  Always use your most recent med list.                   Brand Name Dispense Instructions for use Diagnosis    DAILY VITAMINS PO      Take 1 tablet by mouth daily        lisinopril-hydrochlorothiazide 20-25 MG per tablet    PRINZIDE/ZESTORETIC    90 tablet    Take 1 tablet by mouth daily for blood pressure.    Essential hypertension with goal blood pressure less than 140/90       simvastatin 40 MG tablet    ZOCOR    30 tablet    Take 1  tablet (40 mg) by mouth every evening for cholesterol.    Hyperlipidemia LDL goal <130       traZODone 50 MG tablet    DESYREL    90 tablet    Take 1 tablet (50 mg) by mouth At Bedtime for depression and sleep.    Primary insomnia

## 2017-07-28 ASSESSMENT — ANXIETY QUESTIONNAIRES
3. WORRYING TOO MUCH ABOUT DIFFERENT THINGS: SEVERAL DAYS
GAD7 TOTAL SCORE: 2
2. NOT BEING ABLE TO STOP OR CONTROL WORRYING: SEVERAL DAYS
1. FEELING NERVOUS, ANXIOUS, OR ON EDGE: NOT AT ALL
5. BEING SO RESTLESS THAT IT IS HARD TO SIT STILL: NOT AT ALL
IF YOU CHECKED OFF ANY PROBLEMS ON THIS QUESTIONNAIRE, HOW DIFFICULT HAVE THESE PROBLEMS MADE IT FOR YOU TO DO YOUR WORK, TAKE CARE OF THINGS AT HOME, OR GET ALONG WITH OTHER PEOPLE: NOT DIFFICULT AT ALL
7. FEELING AFRAID AS IF SOMETHING AWFUL MIGHT HAPPEN: NOT AT ALL
6. BECOMING EASILY ANNOYED OR IRRITABLE: NOT AT ALL

## 2017-07-28 ASSESSMENT — PATIENT HEALTH QUESTIONNAIRE - PHQ9: 5. POOR APPETITE OR OVEREATING: NOT AT ALL

## 2017-07-29 ASSESSMENT — ANXIETY QUESTIONNAIRES: GAD7 TOTAL SCORE: 2

## 2017-07-29 ASSESSMENT — PATIENT HEALTH QUESTIONNAIRE - PHQ9: SUM OF ALL RESPONSES TO PHQ QUESTIONS 1-9: 6

## 2017-08-01 ENCOUNTER — OFFICE VISIT (OUTPATIENT)
Dept: FAMILY MEDICINE | Facility: CLINIC | Age: 57
End: 2017-08-01
Payer: COMMERCIAL

## 2017-08-01 VITALS
WEIGHT: 193 LBS | TEMPERATURE: 98.8 F | HEART RATE: 88 BPM | HEIGHT: 70 IN | OXYGEN SATURATION: 95 % | BODY MASS INDEX: 27.63 KG/M2 | SYSTOLIC BLOOD PRESSURE: 136 MMHG | DIASTOLIC BLOOD PRESSURE: 86 MMHG

## 2017-08-01 DIAGNOSIS — F33.1 MODERATE EPISODE OF RECURRENT MAJOR DEPRESSIVE DISORDER (H): Primary | ICD-10-CM

## 2017-08-01 DIAGNOSIS — F10.10 ALCOHOL ABUSE: ICD-10-CM

## 2017-08-01 PROCEDURE — 99214 OFFICE O/P EST MOD 30 MIN: CPT | Performed by: PHYSICIAN ASSISTANT

## 2017-08-01 RX ORDER — FLUOXETINE 10 MG/1
10 CAPSULE ORAL DAILY
Qty: 30 CAPSULE | Refills: 0 | Status: SHIPPED | OUTPATIENT
Start: 2017-08-01 | End: 2017-11-02

## 2017-08-01 NOTE — LETTER
[address] [address]  Phone: [phone]    August 1, 2017        Ed MADDIE Ronni  [address] [place] [id number]          To whom it may concern:    RE: Ed Rudoplh    Patient was seen and treated today at our clinic and missed work.  Patient may return to work 8/2/2017 with the following:  No working or lifting restrictions    Please contact me for questions or concerns.      Sincerely,        Piter Johnston PA-C

## 2017-08-01 NOTE — LETTER
My Depression Action Plan  Name: Ed Rudolph   Date of Birth 1960  Date: 8/1/2017    My doctor: Anna Chu   My clinic: [facility] [id number] [phone]          GREEN    ZONE   Good Control    What it looks like:     Things are going generally well. You have normal up s and down s. You may even feel depressed from time to time, but bad moods usually last less than a day.   What you need to do:  1. Continue to care for yourself (see self care plan)  2. Check your depression survival kit and update it as needed  3. Follow your physician s recommendations including any medication.  4. Do not stop taking medication unless you consult with your physician first.           YELLOW         ZONE Getting Worse    What it looks like:     Depression is starting to interfere with your life.     It may be hard to get out of bed; you may be starting to isolate yourself from others.    Symptoms of depression are starting to last most all day and this has happened for several days.     You may have suicidal thoughts but they are not constant.   What you need to do:     1. Call your care team, your response to treatment will improve if you keep your care team informed of your progress. Yellow periods are signs an adjustment may need to be made.     2. Continue your self-care, even if you have to fake it!    3. Talk to someone in your support network    4. Open up your depression survival kit           RED    ZONE Medical Alert - Get Help    What it looks like:     Depression is seriously interfering with your life.     You may experience these or other symptoms: You can t get out of bed most days, can t work or engage in other necessary activities, you have trouble taking care of basic hygiene, or basic responsibilities, thoughts of suicide or death that will not go away, self-injurious behavior.     What you need to do:  1. Call  your care team and request a same-day appointment. If they are not available (weekends or after hours) call your local crisis line, emergency room or 911.      Electronically signed by: Piter Johnston, August 1, 2017    Depression Self Care Plan / Survival Kit    Self-Care for Depression  Here s the deal. Your body and mind are really not as separate as most people think.  What you do and think affects how you feel and how you feel influences what you do and think. This means if you do things that people who feel good do, it will help you feel better.  Sometimes this is all it takes.  There is also a place for medication and therapy depending on how severe your depression is, so be sure to consult with your medical provider and/ or Behavioral Health Consultant if your symptoms are worsening or not improving.     In order to better manage my stress, I will:    Exercise  Get some form of exercise, every day. This will help reduce pain and release endorphins, the  feel good  chemicals in your brain. This is almost as good as taking antidepressants!  This is not the same as joining a gym and then never going! (they count on that by the way ) It can be as simple as just going for a walk or doing some gardening, anything that will get you moving.      Hygiene   Maintain good hygiene (Get out of bed in the morning, Make your bed, Brush your teeth, Take a shower, and Get dressed like you were going to work, even if you are unemployed).  If your clothes don't fit try to get ones that do.    Diet  I will strive to eat foods that are good for me, drink plenty of water, and avoid excessive sugar, caffeine, alcohol, and other mood-altering substances.  Some foods that are helpful in depression are: complex carbohydrates, B vitamins, flaxseed, fish or fish oil, fresh fruits and vegetables.    Psychotherapy  I agree to participate in Individual Therapy (if recommended).    Medication  If prescribed medications, I agree to  take them.  Missing doses can result in serious side effects.  I understand that drinking alcohol, or other illicit drug use, may cause potential side effects.  I will not stop my medication abruptly without first discussing it with my provider.    Staying Connected With Others  I will stay in touch with my friends, family members, and my primary care provider/team.    Use your imagination  Be creative.  We all have a creative side; it doesn t matter if it s oil painting, sand castles, or mud pies! This will also kick up the endorphins.    Witness Beauty  (AKA stop and smell the roses) Take a look outside, even in mid-winter. Notice colors, textures. Watch the squirrels and birds.     Service to others  Be of service to others.  There is always someone else in need.  By helping others we can  get out of ourselves  and remember the really important things.  This also provides opportunities for practicing all the other parts of the program.    Humor  Laugh and be silly!  Adjust your TV habits for less news and crime-drama and more comedy.    Control your stress  Try breathing deep, massage therapy, biofeedback, and meditation. Find time to relax each day.     My support system    Clinic Contact:  Phone number:    Contact 1:  Phone number:    Contact 2:  Phone number:    Druze/:  Phone number:    Therapist:  Phone number:    Local crisis center:    Phone number:    Other community support:  Phone number:

## 2017-08-01 NOTE — PROGRESS NOTES
"  SUBJECTIVE:                                                    Ed Rudolph is a 57 year old male who presents to clinic today for the following health issues:      Depression Followup    Status since last visit: Worsened     See PHQ-9 for current symptoms.  Other associated symptoms:    Complicating factors:   Significant life event:  No   Current substance abuse:  Alcohol  Anxiety or Panic symptoms:  No    PHQ-9  English  PHQ-9   Any Language      Amount of exercise or physical activity: 2-3 days/week for an average of 15-30 minutes    Problems taking medications regularly: No    Medication side effects: none  Diet: regular (no restrictions)      Problem list and histories reviewed & adjusted, as indicated.  Additional history: Patient notes that he felt better when treating his depression.  He is refusing inpatient or outpatient Chem Dep but is amenable to [facility] appt and AA interventions at this time in addition to medication start.     ROS:  Constitutional, HEENT, cardiovascular, pulmonary, gi and gu systems are negative, except as otherwise noted.      OBJECTIVE:   /86 (BP Location: Right arm, Patient Position: Chair, Cuff Size: Adult Regular)  Pulse 88  Temp 98.8  F (37.1  C) (Oral)  Ht 5' 9.5\" (1.765 m)  Wt 193 lb (87.5 kg)  SpO2 95%  BMI 28.09 kg/m2  Body mass index is 28.09 kg/(m^2).  GENERAL: healthy, alert and no distress  MS: no gross musculoskeletal defects noted, no edema  SKIN: no suspicious lesions or rashes  PSYCH: Psych: Alert and oriented times 3; coherent speech, normal   rate and volume, able to articulate logical thoughts, able   to abstract reason, no tangential thoughts, no hallucinations   or delusions  His affect is congruent.      Diagnostic Test Results:  none     ASSESSMENT/PLAN:   1. Moderate episode of recurrent major depressive disorder (H)  - MENTAL HEALTH REFERRAL  - FLUoxetine (PROZAC) 10 MG capsule; Take 1 capsule (10 mg) by mouth daily  Dispense: 30 capsule; " Refill: 0    2. Alcohol abuse   MENTAL HEALTH REFERRAL    Use medication as directed.  Follow up in 2 weeks, sooner if needed  Follow up per [facility]  Patient amenable to this follow up plan.     Piter Johnston PA-C  [facility]

## 2017-08-01 NOTE — NURSING NOTE
"Chief Complaint   Patient presents with     Depression       Initial /86 (BP Location: Right arm, Patient Position: Chair, Cuff Size: Adult Regular)  Pulse 88  Temp 98.8  F (37.1  C) (Oral)  Ht 5' 9.5\" (1.765 m)  Wt 193 lb (87.5 kg)  SpO2 95%  BMI 28.09 kg/m2 Estimated body mass index is 28.09 kg/(m^2) as calculated from the following:    Height as of this encounter: 5' 9.5\" (1.765 m).    Weight as of this encounter: 193 lb (87.5 kg).  Medication Reconciliation: complete   Zaida Crowder MA        "

## 2017-08-01 NOTE — MR AVS SNAPSHOT
After Visit Summary   8/1/2017    Ed Rudolph    MRN: [id number]           Patient Information     Date Of Birth          1960        Visit Information        Provider Department      8/1/2017 4:20 PM Piter Johnston PA-C [facility]        Today's Diagnoses     Moderate episode of recurrent major depressive disorder (H)    -  1    Alcohol abuse          Care Instructions    This summary includes the important diagnoses, test, medications and other important parts of your medical history.  Below are a few good we sites you can use to learn more about these.     Www.boldUnderline. llc.Divide : Up to date and easily searchable information on multiple topics.  Www.[facility] BaptistRobotsAlive.Divide/Pharmacy/c_539084.asp : [facility] $4.99 medications  Www.Doctor Fun.gov : medication info, interactive tutorials, watch real surgeries online  Www.familydoctor.org : good info from the Academy of Family Physicians  Www.Kandu.Varaa.com : good info from the [facility]  Www.cdc.gov : public health info, travel advisories, epidemics (H1N1)  Www.aap.org : children's health info, normal development, vaccinations  Www.health.state.[place].us : [place] dept of heatlh, public health issues in [place], N1N1    Based on your medical history and these are the current health maintenance or preventive care services that you are due for (some may have been done at this visit:)  Health Maintenance Due   Topic Date Due     COLON CANCER SCREEN (SYSTEM ASSIGNED)  05/07/2011     LIPID MONITORING Q1 YEAR  02/10/2016     ADVANCE DIRECTIVE PLANNING Q5 YRS  05/07/2016     BMP Q1 YR  12/22/2016     =================================================================================  Normal Values   Blood pressure  <140/90 for most adults    <130/80 for some chronic diseases (ask your care team about yours)    BMI (body mass index)  18.5-25 kg/m2 (based on height and weight)     Thank you for visiting [facility]  or non-critical lab and imaging results will be communicated to you by MyChart, letter or phone within 7 days.  If you do not hear from us within 10 days, please call the clinic. If you have a critical or abnormal lab result, we will notify you by phone as soon as possible.     If you have any questions regarding your visit please contact:     Team Comfort:   Clinic Hours Telephone Number   Dr. Aris Downs   7am-5pm  Monday - Friday [phone]     Pharmacy 8:30am-9pm Monday-Friday    9am-5pm Saturday-Sunday [phone]   Urgent Care 11am-9pm Monday-Friday        9am-5pm Saturday-Sunday [phone]     After hours, weekend or if you need to make an appointment with your primary provider please call [phone].   After Hours nurse advise: call [facility]: [phone]    Medication Refills:  Call your pharmacy and they will forward the refill to us. Please allow 3 business days for your refills to be completed.    Use [employer] (secure email communication and access to your chart) to send your primary care provider a message or make an appointment. Ask someone on your Team how to sign up for [employer]. To log on to [employer] or for more information in [employer] please visit the website at [email or web address].  As of October 8, 2013, all password changes, disabled accounts, or ID changes in [employer]/MyHealth will be done by our Access Services Department.   If you need help with your account or password, call: [phone]. Clinic staff no longer has the ability to change passwords.               Follow-ups after your visit        Additional Services     MENTAL HEALTH REFERRAL       Your provider has referred you to: [facility]: [facility] - Counseling (Individual/Couples/Family) - [facility] Joana Nixon [phone]   [email or web address]/   *Patient will be  contacted by [facility]'s scheduling partner, [facility]), to schedule an appointment.  Patients may also call P to schedule.    All scheduling is subject to the client's specific insurance plan & benefits, provider/location availability, and provider clinical specialities.  Please arrive 15 minutes early for your first appointment and bring your completed paperwork.    Please be aware that coverage of these services is subject to the terms and limitations of your health insurance plan.  Call member services at your health plan with any benefit or coverage questions.                  Who to contact     If you have questions or need follow up information about today's clinic visit or your schedule please contact [facility] directly at [phone].  Normal or non-critical lab and imaging results will be communicated to you by InflowControlhart, letter or phone within 4 business days after the clinic has received the results. If you do not hear from us within 7 days, please contact the clinic through InflowControlhart or phone. If you have a critical or abnormal lab result, we will notify you by phone as soon as possible.  Submit refill requests through TripOvation or call your pharmacy and they will forward the refill request to us. Please allow 3 business days for your refill to be completed.          Additional Information About Your Visit        InflowControlharSudiksha Information     TripOvation gives you secure access to your electronic health record. If you see a primary care provider, you can also send messages to your care team and make appointments. If you have questions, please call your primary care clinic.  If you do not have a primary care provider, please call [phone] and they will assist you.        Care EveryWhere ID     This is your Care EveryWhere ID. This could be used by other organizations to access your [facility] medical records  [id number]        Your Vitals Were     Pulse Temperature  "Height Pulse Oximetry BMI (Body Mass Index)       88 98.8  F (37.1  C) (Oral) 5' 9.5\" (1.765 m) 95% 28.09 kg/m2        Blood Pressure from Last 3 Encounters:   08/01/17 136/86   07/27/17 133/83   04/08/17 117/73    Weight from Last 3 Encounters:   08/01/17 193 lb (87.5 kg)   07/27/17 201 lb (91.2 kg)   04/08/17 191 lb (86.6 kg)              We Performed the Following     MENTAL HEALTH REFERRAL          Today's Medication Changes          These changes are accurate as of: 8/1/17 11:59 PM.  If you have any questions, ask your nurse or doctor.               Start taking these medicines.        Dose/Directions    FLUoxetine 10 MG capsule   Commonly known as:  PROzac   Used for:  Moderate episode of recurrent major depressive disorder (H)   Started by:  Piter Johnston PA-C        Dose:  10 mg   Take 1 capsule (10 mg) by mouth daily   Quantity:  30 capsule   Refills:  0            Where to get your medicines      These medications were sent to [facility] - [place], [place] - [address]  [address], [facility] [address]     Phone:  [phone]     FLUoxetine 10 MG capsule                Primary Care Provider Office Phone #    [facility] [phone]       No address on file        Equal Access to Services     DAMI JOHNS AH: Hadii ronny ku hadasho Soomaali, waaxda luqadaha, qaybta kaalmada adeegyada, waldo cox hayfouzia clayton. So [facility] [phone].    ATENCIÓN: Si habla español, tiene a reyes disposición servicios gratuitos de asistencia lingüística. Nanette al [phone].    We comply with applicable federal civil rights laws and [place] laws. We do not discriminate on the basis of race, color, national origin, age, disability sex, sexual orientation or gender identity.            Thank you!     Thank you for choosing [facility]  for your care. Our goal is always to provide you with excellent care. Hearing back from our patients " is one way we can continue to improve our services. Please take a few minutes to complete the written survey that you may receive in the mail after your visit with us. Thank you!             Your Updated Medication List - Protect others around you: Learn how to safely use, store and throw away your medicines at www.disposemymeds.org.          This list is accurate as of: 8/1/17 11:59 PM.  Always use your most recent med list.                   Brand Name Dispense Instructions for use Diagnosis    DAILY VITAMINS PO      Take 1 tablet by mouth daily        FLUoxetine 10 MG capsule    PROzac    30 capsule    Take 1 capsule (10 mg) by mouth daily    Moderate episode of recurrent major depressive disorder (H)       lisinopril-hydrochlorothiazide 20-25 MG per tablet    PRINZIDE/ZESTORETIC    90 tablet    Take 1 tablet by mouth daily for blood pressure.    Essential hypertension with goal blood pressure less than 140/90       simvastatin 40 MG tablet    ZOCOR    30 tablet    Take 1 tablet (40 mg) by mouth every evening for cholesterol.    Hyperlipidemia LDL goal <130       traZODone 50 MG tablet    DESYREL    90 tablet    Take 1 tablet (50 mg) by mouth At Bedtime for depression and sleep.    Primary insomnia, Major depressive disorder, recurrent episode, mild (H)

## 2017-08-01 NOTE — PATIENT INSTRUCTIONS
This summary includes the important diagnoses, test, medications and other important parts of your medical history.  Below are a few good we sites you can use to learn more about these.     Www.[facility].org : Up to date and easily searchable information on multiple topics.  Www.[facility].org/Pharmacy/c_539084.asp : [facility] $4.99 medications  Www.medlineplus.gov : medication info, interactive tutorials, watch real surgeries online  Www.familydoctor.org : good info from the Academy of Family Physicians  Www.Skinny Mominic.com : good info from the [facility]  Www.cdc.gov : public health info, travel advisories, epidemics (H1N1)  Www.aap.org : children's health info, normal development, vaccinations  Www.health.[facility].[place].us : [place] dept of heat, public health issues in [place], N1N1    Based on your medical history and these are the current health maintenance or preventive care services that you are due for (some may have been done at this visit:)  Health Maintenance Due   Topic Date Due     COLON CANCER SCREEN (SYSTEM ASSIGNED)  05/07/2011     LIPID MONITORING Q1 YEAR  02/10/2016     ADVANCE DIRECTIVE PLANNING Q5 YRS  05/07/2016     BMP Q1 YR  12/22/2016     =================================================================================  Normal Values   Blood pressure  <140/90 for most adults    <130/80 for some chronic diseases (ask your care team about yours)    BMI (body mass index)  18.5-25 kg/m2 (based on height and weight)     Thank you for visiting [facility] [place]    Normal or non-critical lab and imaging results will be communicated to you by MyChart, letter or phone within 7 days.  If you do not hear from us within 10 days, please call the clinic. If you have a critical or abnormal lab result, we will notify you by phone as soon as possible.     If you have any questions regarding your visit please contact:     Team Comfort:   Clinic Hours Telephone Number   Dr. Aris Sierra  Serena Downs   7am-5pm  Monday - Friday [phone]     Pharmacy 8:30am-9pm Monday-Friday    9am-5pm Saturday-Sunday [phone]   Urgent Care 11am-9pm Monday-Friday        9am-5pm Saturday-Sunday [phone]     After hours, weekend or if you need to make an appointment with your primary provider please call [phone].   After Hours nurse advise: call [facility]: [phone]    Medication Refills:  Call your pharmacy and they will forward the refill to us. Please allow 3 business days for your refills to be completed.    Use [employer] (secure email communication and access to your chart) to send your primary care provider a message or make an appointment. Ask someone on your Team how to sign up for [employer] Matters. To log on to [employer] or for more information in [employer] please visit the website at [email or web address].  As of October 8, 2013, all password changes, disabled accounts, or ID changes in Your [employer] Matters/MyHealth will be done by our Access Services Department.   If you need help with your account or password, call: [phone]. Clinic staff no longer has the ability to change passwords.

## 2017-08-02 ASSESSMENT — PATIENT HEALTH QUESTIONNAIRE - PHQ9: SUM OF ALL RESPONSES TO PHQ QUESTIONS 1-9: 15

## 2017-09-15 ENCOUNTER — OFFICE VISIT (OUTPATIENT)
Dept: FAMILY MEDICINE | Facility: CLINIC | Age: 57
End: 2017-09-15
Payer: COMMERCIAL

## 2017-09-15 VITALS
HEIGHT: 78 IN | OXYGEN SATURATION: 97 % | WEIGHT: 192.6 LBS | SYSTOLIC BLOOD PRESSURE: 120 MMHG | TEMPERATURE: 96.9 F | BODY MASS INDEX: 22.28 KG/M2 | DIASTOLIC BLOOD PRESSURE: 74 MMHG | HEART RATE: 76 BPM

## 2017-09-15 DIAGNOSIS — R51.9 HEADACHE, UNSPECIFIED HEADACHE TYPE: ICD-10-CM

## 2017-09-15 DIAGNOSIS — F33.1 MODERATE EPISODE OF RECURRENT MAJOR DEPRESSIVE DISORDER (H): Primary | ICD-10-CM

## 2017-09-15 DIAGNOSIS — Z12.11 SCREEN FOR COLON CANCER: ICD-10-CM

## 2017-09-15 DIAGNOSIS — F10.90 HEAVY ALCOHOL USE: ICD-10-CM

## 2017-09-15 DIAGNOSIS — I10 ESSENTIAL HYPERTENSION WITH GOAL BLOOD PRESSURE LESS THAN 140/90: ICD-10-CM

## 2017-09-15 DIAGNOSIS — E78.5 HYPERLIPIDEMIA LDL GOAL <130: ICD-10-CM

## 2017-09-15 DIAGNOSIS — D64.9 ANEMIA, UNSPECIFIED TYPE: ICD-10-CM

## 2017-09-15 LAB
ALT SERPL W P-5'-P-CCNC: 40 U/L (ref 0–70)
ANION GAP SERPL CALCULATED.3IONS-SCNC: 9 MMOL/L (ref 3–14)
BASOPHILS # BLD AUTO: 0 10E9/L (ref 0–0.2)
BASOPHILS NFR BLD AUTO: 0.8 %
BUN SERPL-MCNC: 15 MG/DL (ref 7–30)
CALCIUM SERPL-MCNC: 8.9 MG/DL (ref 8.5–10.1)
CHLORIDE SERPL-SCNC: 102 MMOL/L (ref 94–109)
CHOLEST SERPL-MCNC: 222 MG/DL
CO2 SERPL-SCNC: 25 MMOL/L (ref 20–32)
CREAT SERPL-MCNC: 0.93 MG/DL (ref 0.66–1.25)
DIFFERENTIAL METHOD BLD: ABNORMAL
EOSINOPHIL # BLD AUTO: 0.1 10E9/L (ref 0–0.7)
EOSINOPHIL NFR BLD AUTO: 2.3 %
ERYTHROCYTE [DISTWIDTH] IN BLOOD BY AUTOMATED COUNT: 12.7 % (ref 10–15)
FERRITIN SERPL-MCNC: 234 NG/ML (ref 26–388)
GFR SERPL CREATININE-BSD FRML MDRD: 84 ML/MIN/1.7M2
GLUCOSE SERPL-MCNC: 86 MG/DL (ref 70–99)
HCT VFR BLD AUTO: 37.7 % (ref 40–53)
HDLC SERPL-MCNC: 62 MG/DL
HEMOCCULT STL QL IA: NEGATIVE
HGB BLD-MCNC: 12.9 G/DL (ref 13.3–17.7)
IRON SATN MFR SERPL: 32 % (ref 15–46)
IRON SERPL-MCNC: 110 UG/DL (ref 35–180)
LDLC SERPL CALC-MCNC: 112 MG/DL
LYMPHOCYTES # BLD AUTO: 2 10E9/L (ref 0.8–5.3)
LYMPHOCYTES NFR BLD AUTO: 39 %
MCH RBC QN AUTO: 33.2 PG (ref 26.5–33)
MCHC RBC AUTO-ENTMCNC: 34.2 G/DL (ref 31.5–36.5)
MCV RBC AUTO: 97 FL (ref 78–100)
MONOCYTES # BLD AUTO: 0.7 10E9/L (ref 0–1.3)
MONOCYTES NFR BLD AUTO: 13.5 %
NEUTROPHILS # BLD AUTO: 2.3 10E9/L (ref 1.6–8.3)
NEUTROPHILS NFR BLD AUTO: 44.4 %
NONHDLC SERPL-MCNC: 160 MG/DL
PLATELET # BLD AUTO: 287 10E9/L (ref 150–450)
POTASSIUM SERPL-SCNC: 3.5 MMOL/L (ref 3.4–5.3)
RBC # BLD AUTO: 3.89 10E12/L (ref 4.4–5.9)
SODIUM SERPL-SCNC: 136 MMOL/L (ref 133–144)
TIBC SERPL-MCNC: 339 UG/DL (ref 240–430)
TRIGL SERPL-MCNC: 239 MG/DL
WBC # BLD AUTO: 5.2 10E9/L (ref 4–11)

## 2017-09-15 PROCEDURE — 84460 ALANINE AMINO (ALT) (SGPT): CPT | Performed by: FAMILY MEDICINE

## 2017-09-15 PROCEDURE — 85025 COMPLETE CBC W/AUTO DIFF WBC: CPT | Performed by: PREVENTIVE MEDICINE

## 2017-09-15 PROCEDURE — 80048 BASIC METABOLIC PNL TOTAL CA: CPT | Performed by: FAMILY MEDICINE

## 2017-09-15 PROCEDURE — 83550 IRON BINDING TEST: CPT | Performed by: PREVENTIVE MEDICINE

## 2017-09-15 PROCEDURE — 83540 ASSAY OF IRON: CPT | Performed by: PREVENTIVE MEDICINE

## 2017-09-15 PROCEDURE — 99214 OFFICE O/P EST MOD 30 MIN: CPT | Performed by: PREVENTIVE MEDICINE

## 2017-09-15 PROCEDURE — 36415 COLL VENOUS BLD VENIPUNCTURE: CPT | Performed by: PREVENTIVE MEDICINE

## 2017-09-15 PROCEDURE — 82728 ASSAY OF FERRITIN: CPT | Performed by: PREVENTIVE MEDICINE

## 2017-09-15 PROCEDURE — 82274 ASSAY TEST FOR BLOOD FECAL: CPT | Performed by: FAMILY MEDICINE

## 2017-09-15 PROCEDURE — 80061 LIPID PANEL: CPT | Performed by: FAMILY MEDICINE

## 2017-09-15 RX ORDER — SIMVASTATIN 40 MG
40 TABLET ORAL EVERY EVENING
Qty: 90 TABLET | Refills: 1 | Status: SHIPPED | OUTPATIENT
Start: 2017-09-15 | End: 2018-02-06

## 2017-09-15 ASSESSMENT — ANXIETY QUESTIONNAIRES
2. NOT BEING ABLE TO STOP OR CONTROL WORRYING: NOT AT ALL
GAD7 TOTAL SCORE: 2
1. FEELING NERVOUS, ANXIOUS, OR ON EDGE: NOT AT ALL
3. WORRYING TOO MUCH ABOUT DIFFERENT THINGS: NOT AT ALL
5. BEING SO RESTLESS THAT IT IS HARD TO SIT STILL: NOT AT ALL
6. BECOMING EASILY ANNOYED OR IRRITABLE: SEVERAL DAYS
IF YOU CHECKED OFF ANY PROBLEMS ON THIS QUESTIONNAIRE, HOW DIFFICULT HAVE THESE PROBLEMS MADE IT FOR YOU TO DO YOUR WORK, TAKE CARE OF THINGS AT HOME, OR GET ALONG WITH OTHER PEOPLE: NOT DIFFICULT AT ALL
7. FEELING AFRAID AS IF SOMETHING AWFUL MIGHT HAPPEN: NOT AT ALL

## 2017-09-15 ASSESSMENT — PATIENT HEALTH QUESTIONNAIRE - PHQ9
5. POOR APPETITE OR OVEREATING: SEVERAL DAYS
SUM OF ALL RESPONSES TO PHQ QUESTIONS 1-9: 7

## 2017-09-15 NOTE — MR AVS SNAPSHOT
After Visit Summary   9/15/2017    Ed Rudolph    MRN: [id number]           Patient Information     Date Of Birth          1960        Visit Information        Provider Department      9/15/2017 4:20 PM Vicki Dobbs MD [facility]        Today's Diagnoses     Moderate episode of recurrent major depressive disorder (H)    -  1    Screen for colon cancer        Essential hypertension with goal blood pressure less than 140/90        Hyperlipidemia LDL goal <130        Heavy alcohol use        Anemia, unspecified type        Headache, unspecified headache type          Care Instructions    Based on your medical history and these are the current health maintenance or preventive care services that you are due for (some may have been done at this visit)  Health Maintenance Due   Topic Date Due     COLON CANCER SCREEN (SYSTEM ASSIGNED)  05/07/2011     LIPID MONITORING Q1 YEAR  02/10/2016     ADVANCE DIRECTIVE PLANNING Q5 YRS  05/07/2016     BMP Q1 YR  12/22/2016     INFLUENZA VACCINE (SYSTEM ASSIGNED)  09/01/2017         At [facility], we strive to deliver an exceptional experience to you, every time we see you.    If you receive a survey in the mail, please send us back your thoughts. We really do value your feedback.    Your care team's suggested websites for health information:  Www.[employer].org : Up to date and easily searchable information on multiple topics.  Www.medlineplus.gov : medication info, interactive tutorials, watch real surgeries online  Www.familydoctor.org : good info from the Academy of Family Physicians  Www.cdc.gov : public health info, travel advisories, epidemics (H1N1)  Www.aap.org : children's health info, normal development, vaccinations  Www.health.state[place]us : [place] dept of health, public health issues in [place], N1N1    How to contact your care team:   Team Lyssa/Spirit [phone]         Pharmacy [phone]      [place], Kell Chase PA-C, Dr. Dobbs, Raisa VENCES CNP, Leatha Bermudez PA-C, Dr. Castle, and VANGIE Jaquez CNP    Team RNs: Petra & Teri      Clinic hours  M-Th 7 am-7 pm   Fri 7 am-5 pm.   Urgent care M-F 11 am-9 pm,   Sat/Sun 9 am-5 pm.  Pharmacy M-Th 8 am-8 pm Fri 8 am-6 pm  Sat/Sun 9 am-5 pm.     All password changes, disabled accounts, or ID changes in [employer]/MyHealth will be done by our Access Services Department.    If you need help with your account or password, call: [phone]. Clinic staff no longer has the ability to change passwords.             Follow-ups after your visit        Additional Services     CARE COORDINATION REFERRAL       Services are provided by a Care Coordinator for people with complex needs such as: medical, social, or financial troubles.  The Care Coordinator works with the patient and their Primary Care Provider to determine health goals, obtain resources, achieve outcomes, and develop care plans that help coordinate the patient's care.     Reason for Referral: Chemical Dependence: Patient reluctant to discuss the dependence    Provide additional details for Care Coordination to best meet the patient's current needs: Alcohol use, attended one AA meeting, has declined more formal chemical dependency treatments    Clinical Staff have discussed the Care Coordination Referral with the patient and/or caregiver: yes                  Follow-up notes from your care team     Return in about 4 weeks (around 10/13/2017) for Mood check .      Future tests that were ordered for you today     Open Future Orders        Priority Expected Expires Ordered    CT Head w/o Contrast Routine  9/15/2018 9/15/2017            Who to contact     If you have questions or need follow up information about today's clinic visit or your schedule please contact [facility] directly at [phone].  Normal or non-critical lab and imaging results will be communicated to  "you by MyChart, letter or phone within 4 business days after the clinic has received the results. If you do not hear from us within 7 days, please contact the clinic through BESOS or phone. If you have a critical or abnormal lab result, we will notify you by phone as soon as possible.  Submit refill requests through BESOS or call your pharmacy and they will forward the refill request to us. Please allow 3 business days for your refill to be completed.          Additional Information About Your Visit        [employer] Information     BESOS gives you secure access to your electronic health record. If you see a primary care provider, you can also send messages to your care team and make appointments. If you have questions, please call your primary care clinic.  If you do not have a primary care provider, please call [phone] and they will assist you.        Care EveryWhere ID     This is your Care EveryWhere ID. This could be used by other organizations to access your [place] medical records  [id number]        Your Vitals Were     Pulse Temperature Height Pulse Oximetry BMI (Body Mass Index)       76 96.9  F (36.1  C) (Oral) 6' 9.5\" (2.07 m) 97% 20.39 kg/m2        Blood Pressure from Last 3 Encounters:   09/15/17 120/74   08/01/17 136/86   07/27/17 133/83    Weight from Last 3 Encounters:   09/15/17 192 lb 9.6 oz (87.4 kg)   08/01/17 193 lb (87.5 kg)   07/27/17 201 lb (91.2 kg)              We Performed the Following     CARE COORDINATION REFERRAL     CBC with platelets differential     Ferritin     Iron and iron binding capacity          Today's Medication Changes          These changes are accurate as of: 9/15/17  6:05 PM.  If you have any questions, ask your nurse or doctor.               These medicines have changed or have updated prescriptions.        Dose/Directions    * FLUoxetine 10 MG capsule   Commonly known as:  PROzac   This may have changed:  Another medication with the same name was added. Make " sure you understand how and when to take each.   Used for:  Moderate episode of recurrent major depressive disorder (H)   Changed by:  Piter Johnston PA-C        Dose:  10 mg   Take 1 capsule (10 mg) by mouth daily   Quantity:  30 capsule   Refills:  0       * FLUoxetine 20 MG capsule   Commonly known as:  PROzac   This may have changed:  You were already taking a medication with the same name, and this prescription was added. Make sure you understand how and when to take each.   Used for:  Moderate episode of recurrent major depressive disorder (H)   Changed by:  Vicki Dobbs MD        Dose:  20 mg   Take 1 capsule (20 mg) by mouth daily   Quantity:  90 capsule   Refills:  0       * Notice:  This list has 2 medication(s) that are the same as other medications prescribed for you. Read the directions carefully, and ask your doctor or other care provider to review them with you.         Where to get your medicines      These [place] [facility] [place] [address]  [address], [facility] [address]     Phone:  [phone]     FLUoxetine 20 MG capsule    simvastatin 40 MG tablet                Primary Care Provider Office Phone #    [facility] [phone]       No address on file        Equal Access to Services     DAMI JOHNS : Cher cunhao Sosimeon, waaxda luqadaha, qaybta kaalmada adeegyada, waldo clayton. So [facility] [phone].    ATENCIÓN: Si habla español, tiene a reyes disposición servicios gratuitos de asistencia lingüística. Llame al [phone].    We comply with applicable federal civil rights laws and [place] laws. We do not discriminate on the basis of race, color, national origin, age, disability sex, sexual orientation or gender identity.            Thank you!     Thank you for choosing [facility]  for your care. Our goal is always to provide you with  excellent care. Hearing back from our patients is one way we can continue to improve our services. Please take a few minutes to complete the written survey that you may receive in the mail after your visit with us. Thank you!             Your Updated Medication List - Protect others around you: Learn how to safely use, store and throw away your medicines at www.disposemymeds.org.          This list is accurate as of: 9/15/17  6:05 PM.  Always use your most recent med list.                   Brand Name Dispense Instructions for use Diagnosis    DAILY VITAMINS PO      Take 1 tablet by mouth daily        * FLUoxetine 10 MG capsule    PROzac    30 capsule    Take 1 capsule (10 mg) by mouth daily    Moderate episode of recurrent major depressive disorder (H)       * FLUoxetine 20 MG capsule    PROzac    90 capsule    Take 1 capsule (20 mg) by mouth daily    Moderate episode of recurrent major depressive disorder (H)       lisinopril-hydrochlorothiazide 20-25 MG per tablet    PRINZIDE/ZESTORETIC    90 tablet    Take 1 tablet by mouth daily for blood pressure.    Essential hypertension with goal blood pressure less than 140/90       simvastatin 40 MG tablet    ZOCOR    90 tablet    Take 1 tablet (40 mg) by mouth every evening for cholesterol.    Hyperlipidemia LDL goal <130       traZODone 50 MG tablet    DESYREL    90 tablet    Take 1 tablet (50 mg) by mouth At Bedtime for depression and sleep.    Primary insomnia, Major depressive disorder, recurrent episode, mild (H)       * Notice:  This list has 2 medication(s) that are the same as other medications prescribed for you. Read the directions carefully, and ask your doctor or other care provider to review them with you.

## 2017-09-15 NOTE — NURSING NOTE
"Chief Complaint   Patient presents with     Recheck Medication     Depression       Initial /87 (BP Location: Left arm, Patient Position: Chair, Cuff Size: Adult Regular)  Pulse 76  Temp 96.9  F (36.1  C) (Oral)  Ht 6' 9.5\" (2.07 m)  Wt 192 lb 9.6 oz (87.4 kg)  SpO2 97%  BMI 20.39 kg/m2 Estimated body mass index is 20.39 kg/(m^2) as calculated from the following:    Height as of this encounter: 6' 9.5\" (2.07 m).    Weight as of this encounter: 192 lb 9.6 oz (87.4 kg).  Medication Reconciliation: complete   Selin Wilhelm CMA      "

## 2017-09-15 NOTE — PROGRESS NOTES
SUBJECTIVE:   Ed Rudolph is a 57 year old male who presents to clinic today for the following health issues:        Depression Followup    Status since last visit: Stable     See PHQ-9 for current symptoms.      Other associated symptoms: None    Complicating factors:   Significant life event:  No   Current substance abuse:  None  Anxiety or Panic symptoms:  No    PHQ-9  English  PHQ-9   Any Language      Amount of exercise or physical activity: None    Problems taking medications regularly: No    Medication side effects: Prozac- Severe headaches  Diet: regular (no restrictions)  No suicidal ideation       Medication Followup of Prozac    Taking Medication as prescribed: yes- finished 30 day supply    Side Effects:  Severe headaches    Medication Helping Symptoms:  yes     Possible anemia:    -Was trying to donate plasma, was told he has low iron levels and could not donate.  -no melena, no bruising, no hematuria.   -Has been dizzy     Alcohol use:    -Last drink 2 days ago  -Went to one AA meeting  -Has been trying to cut down himself  -Has been reluctant to go in to formal chemical dependency treatments  -No falls    Headaches      Duration: 2 weeks    Description  Location: Top of the head   Character: throbbing pain  Frequency:  daily  Duration:  Several hours    Intensity:  moderate    Accompanying signs and symptoms:    Precipitating or Alleviating factors:  Nausea/vomiting: no  Dizziness: occasionally  Weakness or numbness: no  Visual changes: none  Fever: no   Sinus or URI symptoms no     History  Head trauma: no   Family history of migraines: no   Previous tests for headaches: YES- has had an abscess requiring surgery in 2003   Neurologist evaluations: no   Able to do daily activities when headache present: YES  Wake with headaches: YES  Daily pain medication use: YES, having to take 800 mg of Ibuprofen twice a day     Precipitating or Alleviating factors (light/sound/sleep/caffeine):  none    Therapies tried and outcome: Ibuprofen (Advil, Motrin)    Outcome - not effective  Frequent/daily pain medication use: YES      Hyperlipidemia Follow-Up      Rate your low fat/cholesterol diet?: fair    Taking statin?  Yes, no muscle aches from statin    Other lipid medications/supplements?:  none    Hypertension Follow-up      Outpatient blood pressures are being checked at home.  Results are 140 s/80s.    Low Salt Diet: low salt        Problem list and histories reviewed & adjusted, as indicated.  Additional history: as documented    Patient Active Problem List   Diagnosis     Erectile Dysfunction     Overweight (BMI 25.0-29.9)     Hyperlipidemia LDL goal <130     Essential hypertension with goal blood pressure less than 140/90     Impaired fasting glucose     High triglycerides     Prostatic nodule     RLS (restless legs syndrome)     Primary insomnia     Skin cyst     Elevated ferritin     Keloid scar, right neck     Fall     Major depressive disorder, recurrent episode (H)     SLAC (scapholunate advanced collapse) of wrist, left     Past Surgical History:   Procedure Laterality Date     C NONSPECIFIC PROCEDURE  1/03    craniotomy x 2, for treatment of brain abscess     COLONOSCOPY  2001    normal       Social History   Substance Use Topics     Smoking status: Never Smoker     Smokeless tobacco: Never Used     Alcohol use 0.0 oz/week     0 Standard drinks or equivalent per week      Comment: currently drionking about a pint of vodka every day     Family History   Problem Relation Age of Onset     Adopted: Yes     Family History Negative No family hx of      Unknown, adopted         Current Outpatient Prescriptions   Medication Sig Dispense Refill     simvastatin (ZOCOR) 40 MG tablet Take 1 tablet (40 mg) by mouth every evening for cholesterol. 90 tablet 1     FLUoxetine (PROZAC) 20 MG capsule Take 1 capsule (20 mg) by mouth daily 90 capsule 0     lisinopril-hydrochlorothiazide (PRINZIDE/ZESTORETIC) 20-25  "MG per tablet Take 1 tablet by mouth daily for blood pressure. 90 tablet 1     traZODone (DESYREL) 50 MG tablet Take 1 tablet (50 mg) by mouth At Bedtime for depression and sleep. 90 tablet 1     Multiple Vitamin (DAILY VITAMINS PO) Take 1 tablet by mouth daily        FLUoxetine (PROZAC) 10 MG capsule Take 1 capsule (10 mg) by mouth daily (Patient not taking: Reported on 9/15/2017) 30 capsule 0     [DISCONTINUED] simvastatin (ZOCOR) 40 MG tablet Take 1 tablet (40 mg) by mouth every evening for cholesterol. 30 tablet 1     No Known Allergies  BP Readings from Last 3 Encounters:   09/15/17 120/74   08/01/17 136/86   07/27/17 133/83    Wt Readings from Last 3 Encounters:   09/15/17 192 lb 9.6 oz (87.4 kg)   08/01/17 193 lb (87.5 kg)   07/27/17 201 lb (91.2 kg)                        Reviewed and updated as needed this [facility] [place] [place]  Meds       Reviewed and updated as needed this visit by Provider         ROS:  Constitutional, HEENT, cardiovascular, pulmonary, gi and gu systems are negative, except as otherwise noted.      OBJECTIVE:                                                    /74  Pulse 76  Temp 96.9  F (36.1  C) (Oral)  Ht 6' 9.5\" (2.07 m)  Wt 192 lb 9.6 oz (87.4 kg)  SpO2 97%  BMI 20.39 kg/m2  Body mass index is 20.39 kg/(m^2).  GENERAL APPEARANCE: healthy and alert  EYES: Eyes grossly normal to inspection, PERRL and conjunctivae and sclerae normal  NECK: no adenopathy  RESP: lungs clear to auscultation - no rales, rhonchi or wheezes  CV: regular rates and rhythm and normal S1 S2, no S3 or S4  ABDOMEN: soft, non-tender and no rebound or guarding   MS: extremities normal- no gross deformities noted and peripheral pulses normal  SKIN: no suspicious lesions or rashes  NEURO: Normal strength and tone, mentation intact, speech normal, DTR symmetrically normal in lower extremities, gait normal including heel/toe/tandem walking, normal strength throughout and became dizzy after exam " and had to sit down   PSYCH: mentation appears normal    Diagnostic test results:  Diagnostic Test Results:  No results found for this or any previous visit (from the past 24 hour(s)).       ASSESSMENT/PLAN:                                                    1. Moderate episode of recurrent major depressive disorder (H)  -Increased dose to 20 mg daily   - FLUoxetine (PROZAC) 20 MG capsule; Take 1 capsule (20 mg) by mouth daily  Dispense: 90 capsule; Refill: 0  - CARE COORDINATION REFERRAL    2. Screen for colon cancer  -Dropped off FIT cards today     3. Essential hypertension with goal blood pressure less than 140/90  -Recheck Blood pressure is normal  -Cutting down on alcohol should further improve BP  -Continue current medication     4. Hyperlipidemia LDL goal <130  -refill provided  -Lipid profile from today is pending  - simvastatin (ZOCOR) 40 MG tablet; Take 1 tablet (40 mg) by mouth every evening for cholesterol.  Dispense: 90 tablet; Refill: 1    5. Heavy alcohol use  -Attended one AA meeting   - CARE COORDINATION REFERRAL    6. Anemia, unspecified type  -Await labs   - CBC with platelets differential  - Ferritin  - Iron and iron binding capacity    7. Headache, unspecified headache type  -ER precautions reviewed in detail, if increased headache, fever, focal weakness, emesis or vision changes then needs to be seen in ER   -21% patients can get headaches while on Fluoxetine. The headache has persisted even though he has been out of his medication for one week.   - CT Head w/o Contrast; Future      Follow up with Provider - 4-6 weeks with PCP for mood check, sooner if any changes or concerns      Vicki Dobbs MD MPH    [facility]

## 2017-09-15 NOTE — PATIENT INSTRUCTIONS
Based on your medical history and these are the current health maintenance or preventive care services that you are due for (some may have been done at this visit)  Health Maintenance Due   Topic Date Due     COLON CANCER SCREEN (SYSTEM ASSIGNED)  05/07/2011     LIPID MONITORING Q1 YEAR  02/10/2016     ADVANCE DIRECTIVE PLANNING Q5 YRS  05/07/2016     BMP Q1 YR  12/22/2016     INFLUENZA VACCINE (SYSTEM ASSIGNED)  09/01/2017         At [facility], we strive to deliver an exceptional experience to you, every time we see you.    If you receive a survey in the mail, please send us back your thoughts. We really do value your feedback.    Your care team's suggested websites for health information:  Www.[employer].org : Up to date and easily searchable information on multiple topics.  Www.medlineplus.gov : medication info, interactive tutorials, watch real surgeries online  Www.familydoctor.org : good info from the Academy of Family Physicians  Www.cdc.gov : public health info, travel advisories, epidemics (H1N1)  Www.aap.org : children's health info, normal development, vaccinations  Www.health.[facility].[place].us : [place] dept of health, public health issues in [place], N1N1    How to contact your care team:   Team Lyssa/Spirit [phone]         Pharmacy [phone]    Dr. Burrell, Kell Chase PA-C, Dr. Dobbs, Raisa VENCES CNP, Leatha Bermudez PA-C, Dr. Castle, and VANGIE Jaquez CNP    Team RNs: Petra Williamson      Clinic hours  M-Th 7 am-7 pm   Fri 7 am-5 pm.   Urgent care M-F 11 am-9 pm,   Sat/Sun 9 am-5 pm.  Pharmacy M-Th 8 am-8 pm Fri 8 am-6 pm  Sat/Sun 9 am-5 pm.     All password changes, disabled accounts, or ID changes in [employer]/MyHealth will be done by our Access Services Department.    If you need help with your account or password, call: [phone]. Clinic staff no longer has the ability to change passwords.

## 2017-09-16 ASSESSMENT — ANXIETY QUESTIONNAIRES: GAD7 TOTAL SCORE: 2

## 2017-09-18 ENCOUNTER — CARE COORDINATION (OUTPATIENT)
Dept: CARE COORDINATION | Facility: CLINIC | Age: 57
End: 2017-09-18

## 2017-09-18 NOTE — LETTER
[address] [address]  September 20, 2017      Ed Rudolph  [address] [place] [address]    Dear Ed,  I am the Clinic Care Coordinator that works with your primary care provider at the [facility].  After your recent clinic visit,Dr. Dobbs asked me to contact you.  I have been trying to reach you recently to introduce Clinic Care Coordination and to see if there was anything I could assist you with.  Below is a description of what Clinic Care Coordination is and how I can further assist you.     The Clinic Care Coordinator role is a Registered Nurse and/or  who understands the health care system. The goal of Clinic Care Coordination is to help you manage your health and improve access to the [facility] in the most efficient manner.  The Registered Nurse can assist you in meeting your health care goals by providing education, coordinating services, and strengthening the communication among your providers. The  can assist you with financial, behavioral, psychosocial, and chemical dependency and counseling/psychiatric resources.    Please feel free to contact me at [phone]  with any questions or concerns that may arise. We at [facility] are focused on providing you with the highest-quality healthcare experience possible and that all starts with you.       Sincerely,       [facility]  , Clinic Care Coordination  Clinics:  [place]  [phone]   9/20/2017   1:52 PM    Enclosed: I have enclosed a copy of a 24 Hour Access Plan. This has helpful phone numbers for you to call when needed. Please keep this in an easy to access place to use as needed.

## 2017-09-18 NOTE — LETTER
Health Care Home - Access Care Plan    About Me  Patient Name:  Ed Rudolph    YOB: 1960  Age:                            57 year old   Anna MRN:         [id number] Telephone Information:     Home Phone [phone]   Mobile [phone]       Address:    [facility] [address]  [facility] [address] Email address:  [email or web address]      Emergency Contact(s)  Name Relationship Lgl Grd Work Phone Home Phone Mobile Phone   1. CR,ARBRISA* Sister  none [phone]   2. JOEL ALTMAN Friend  none none [phone]             Health Maintenance:      My Access Plan  Medical Emergency 911   Questions or concerns during clinic hours Primary Clinic Line, I will call the clinic directly: Primary Clinic: [facility] [phone]   24 Hour Appointment Line [phone] or  [phone] [place] [phone])  (toll free)   24 Hour Nurse Line [phone] (toll free)   Questions or concerns outside clinic hours 24 Hour Appointment Line, I will call the after-hours on-call line:   [facility] [phone] or [phone]) (toll-free)   Preferred Urgent Care     Preferred Hospital     Preferred Pharmacy [facility] [address] [place] [facility] [place] [id number]     Behavioral Health Crisis Line The National Suicide Prevention Lifeline at 1-485.140.1071 or 480     My Care Team Members  Patient Care Team       Relationship Specialty Notifications Start End    Clinic, [place] PCP - General   12/21/15     Phone: [phone]             My Medical and Care Information  Problem List   Patient Active Problem List   Diagnosis     Erectile Dysfunction     Overweight (BMI 25.0-29.9)     Hyperlipidemia LDL goal <130     Essential hypertension with goal blood pressure less than 140/90     Impaired fasting glucose     High triglycerides     Prostatic nodule     RLS (restless legs syndrome)     Primary insomnia     Skin cyst      Elevated ferritin     Keloid scar, right neck     Fall     Major depressive disorder, recurrent episode (H)     SLAC (scapholunate advanced collapse) of wrist, left      Current Medications and Allergies:  See printed Medication Report

## 2017-09-18 NOTE — PROGRESS NOTES
9/20/2017  Clinic Care Coordination Contact  [facility]- Social Work    Referral Source: PCP  Clinical Data: Care Coordinator Outreach  Outreach attempted x 2.  Left message on voicemail with call back information and requested return call.    Plan: Care Coordinator will mail out care coordination introduction letter with care coordinator contact information and explanation of care coordination services.  If no response, Care Coordinator will try to reach patient again in 7-10 business days.    Talia Antonio [facility]  , Clinic Care Coordination  Clinics:  [facility] [place], [place]  [phone]   9/20/2017   1:48 PM       9/18/2017 Clinic Care Coordination Contact  [facility] Social Work     Referral Source: PCP  Clinical Data: Care Coordinator Outreach  Outreach attempted x 1.  Left message on voicemail with call back information and requested return call.  Plan:Care Coordinator will try to reach patient again in 1-2 business days.    Talia Antonio [facility]  , Clinic Care Coordination  Clinics:  [facility], [place]  [phone]   9/18/2017   1:24 PM

## 2017-09-19 NOTE — PROGRESS NOTES
Ed,    Ferritin and iron studies are normal.  Basic blood count showed borderline low hemoglobin at 12.9, should be over 13.3, but this is unchanged from a year ago.  Plan of care and follow up as discussed in clinic.     Please do not hesitate to call us at [phone] if you have any questions or concerns.    Thank you,    Vicki Dobbs MD MPH

## 2017-09-21 ENCOUNTER — TELEPHONE (OUTPATIENT)
Dept: FAMILY MEDICINE | Facility: CLINIC | Age: 57
End: 2017-09-21

## 2017-09-21 NOTE — TELEPHONE ENCOUNTER
Panel Management Review          Fail List measure:       Patient is due/failing the following:   COLONOSCOPY    Action needed:   none    Type of outreach:    Sent PEER message.    Questions for provider review:    None                                                                                                                                    Deana Cat MA

## 2017-09-22 ENCOUNTER — RADIANT APPOINTMENT (OUTPATIENT)
Dept: CT IMAGING | Facility: CLINIC | Age: 57
End: 2017-09-22
Payer: COMMERCIAL

## 2017-09-22 ENCOUNTER — TELEPHONE (OUTPATIENT)
Dept: FAMILY MEDICINE | Facility: CLINIC | Age: 57
End: 2017-09-22

## 2017-09-22 DIAGNOSIS — R51.9 HEADACHE, UNSPECIFIED HEADACHE TYPE: ICD-10-CM

## 2017-09-22 LAB — RADIOLOGIST FLAGS: ABNORMAL

## 2017-09-22 PROCEDURE — 70450 CT HEAD/BRAIN W/O DYE: CPT | Performed by: RADIOLOGY

## 2017-09-22 NOTE — TELEPHONE ENCOUNTER
Please call Lenin with radiology at [phone] regarding Ed.  Lenin stated he waited on the phone for 30 minutes ended up being transferred to our Women's clinic in [place].  [facility] message sent to Dr. Dobbs.  Called BENNIE Crews at  and notified her of this urgent message to forward to Dr. Dobbs.  Elisabeth Madden RN

## 2017-09-22 NOTE — TELEPHONE ENCOUNTER
I spoke with Lenin at Radiology. Patient has a sub dural hematoma. I called patient, went to voicemail. Left message stating that he needs to go in to the Emergency room immediately as there is a bleed in his brain.    This writer attempted to contact Ed on 09/22/17      Reason for call results of CT scan and left message to return call.      If patient calls back:   Relay message, (read verbatim), document that pt called and close encounter.        Vicki Dobbs MD

## 2017-09-22 NOTE — PROGRESS NOTES
Please CALL patient:    There is a bleed in the brain. Needs to go in to the Emergency room immediately.     Please do not hesitate to call us at [phone] if you have any questions or concerns.    Thank you,    Vicki Dobbs MD MPH

## 2017-09-26 NOTE — TELEPHONE ENCOUNTER
This writer attempted to contact Ed on 09/26/17      Reason for call did patient go to ED/ hospital for subderal hematoma? Assist with ED/ hospital follow up appointment and left detailed message.      If patient calls back:   Patient contacted by clinic RN team. Inform patient that someone from the team will contact them, document that pt called and route to care team. .        Velma Lozano, RN

## 2017-09-26 NOTE — TELEPHONE ENCOUNTER
This writer attempted to contact Ed on 09/26/17      Reason for call did patient go to the ER/hospital for his subdural hematoma? Assist in ER/hospital f/u appointment scheduling? and left message to return call.      If patient calls back:   Patient contacted by clinic RN team. Inform patient that someone from the team will contact them, document that pt called and route to care team. .      Brinda Coffey RN

## 2017-09-26 NOTE — TELEPHONE ENCOUNTER
Patient returned call    Best number to reach them: Home number on file [phone] (home)    Is it ok to leave a detailed message: YES

## 2017-09-26 NOTE — TELEPHONE ENCOUNTER
Patient returned call    Best number to reach them: PLEASE CALL AT THIS # [phone]    Is it ok to leave a detailed message: YES

## 2017-09-26 NOTE — TELEPHONE ENCOUNTER
Patient returned call    Best number to reach them: Other phone number:  [phone]    Is it ok to leave a detailed message: YES

## 2017-09-27 NOTE — TELEPHONE ENCOUNTER
This writer attempted to contact Ed on 09/27/17      Reason for call below message and unable to leave message.      If patient calls back:   Patient contacted by clinic RN team. Inform patient that someone from the team will contact them, document that pt called and route to care team. .        Velma Lozano, RN

## 2017-09-28 ENCOUNTER — TRANSFERRED RECORDS (OUTPATIENT)
Dept: HEALTH INFORMATION MANAGEMENT | Facility: CLINIC | Age: 57
End: 2017-09-28

## 2017-09-28 NOTE — TELEPHONE ENCOUNTER
Detailed message left on voice mail asking if patient went to ER/hospital. How is patient doing? Can we assist in scheduling Hospital follow up visit?   BK clinic number provided for call back.   Brinda Coffey RN

## 2017-10-04 ENCOUNTER — CARE COORDINATION (OUTPATIENT)
Dept: CARE COORDINATION | Facility: CLINIC | Age: 57
End: 2017-10-04

## 2017-10-04 NOTE — PROGRESS NOTES
10/4/2017 Clinic Care Coordination Contact  [facility] -Social Work     Referral Source: PCP  Clinical Data: Care Coordinator Outreach  Outreach attempted x 3.  Left message on voicemail with call back information and requested return call.  No response to calls  and letter.    Plan: Care Coordinator mailed out care coordination introduction letter on 9/20/17 . Care Coordinator will do no further outreaches at this time.    Talia Antonio [facility]  , Clinic Care Coordination  Clinics:  [facility], [place]  [phone]   10/4/2017   11:42 AM

## 2017-10-09 NOTE — TELEPHONE ENCOUNTER
Based on the information I received through Care Everywhere in [facility], patient was seen in the ER on 9/23/17.  Thanks,  Vicki Dobbs MD MPH

## 2017-11-02 ENCOUNTER — OFFICE VISIT (OUTPATIENT)
Dept: FAMILY MEDICINE | Facility: CLINIC | Age: 57
End: 2017-11-02
Payer: COMMERCIAL

## 2017-11-02 ENCOUNTER — TELEPHONE (OUTPATIENT)
Dept: FAMILY MEDICINE | Facility: CLINIC | Age: 57
End: 2017-11-02

## 2017-11-02 VITALS
SYSTOLIC BLOOD PRESSURE: 127 MMHG | WEIGHT: 187 LBS | DIASTOLIC BLOOD PRESSURE: 78 MMHG | OXYGEN SATURATION: 98 % | TEMPERATURE: 97.6 F | HEIGHT: 70 IN | BODY MASS INDEX: 26.77 KG/M2 | HEART RATE: 82 BPM

## 2017-11-02 DIAGNOSIS — Z23 NEED FOR TD VACCINE: ICD-10-CM

## 2017-11-02 DIAGNOSIS — Z23 NEED FOR PROPHYLACTIC VACCINATION AND INOCULATION AGAINST INFLUENZA: ICD-10-CM

## 2017-11-02 DIAGNOSIS — S01.511A LIP LACERATION, INITIAL ENCOUNTER: Primary | ICD-10-CM

## 2017-11-02 PROCEDURE — 90471 IMMUNIZATION ADMIN: CPT | Performed by: FAMILY MEDICINE

## 2017-11-02 PROCEDURE — 90714 TD VACC NO PRESV 7 YRS+ IM: CPT | Performed by: FAMILY MEDICINE

## 2017-11-02 PROCEDURE — 99213 OFFICE O/P EST LOW 20 MIN: CPT | Mod: 25 | Performed by: FAMILY MEDICINE

## 2017-11-02 PROCEDURE — 90472 IMMUNIZATION ADMIN EACH ADD: CPT | Performed by: FAMILY MEDICINE

## 2017-11-02 PROCEDURE — 90686 IIV4 VACC NO PRSV 0.5 ML IM: CPT | Performed by: FAMILY MEDICINE

## 2017-11-02 RX ORDER — CEPHALEXIN 500 MG/1
500 CAPSULE ORAL 2 TIMES DAILY
Qty: 14 CAPSULE | Refills: 0 | Status: SHIPPED | OUTPATIENT
Start: 2017-11-02 | End: 2017-11-09

## 2017-11-02 NOTE — MR AVS SNAPSHOT
After Visit Summary   11/2/2017    Ed Rudolph    MRN: [id number]           Patient Information     Date Of Birth          1960        Visit Information        Provider Department      11/2/2017 5:20 PM Aris Garcia MD [facility]        Today's Diagnoses     Lip laceration, initial encounter    -  1    Need for prophylactic vaccination and inoculation against influenza        Need for TD vaccine          Care Instructions    At [facility], we strive to deliver an exceptional experience to you, every time we see you.  If you receive a survey in the mail, please send us back your thoughts. We really do value your feedback.    Based on your medical history, these are the current health maintenance/preventive care services that you are due for (some may have been done at this visit.)  Health Maintenance Due   Topic Date Due     COLON CANCER SCREEN (SYSTEM ASSIGNED)  05/07/2011     ADVANCE DIRECTIVE PLANNING Q5 YRS  05/07/2016     INFLUENZA VACCINE (SYSTEM ASSIGNED)  09/01/2017         Suggested websites for health information:  Www.PicApp.Biom'Up : Up to date and easily searchable information on multiple topics.  Www.medlineplus.gov : medication info, interactive tutorials, watch real surgeries online  Www.familydoctor.org : good info from the Academy of Family Physicians  Www.cdc.gov : public health info, travel advisories, epidemics (H1N1)  Www.aap.org : children's health info, normal development, vaccinations  Www.health.state.[place]us : [place] dept of health, public health issues in [place], N1N1    Your care team:                            Family Medicine Internal Medicine   MD Marquez Dunaway MD Shantel Branch-Fleming, MD Katya Georgiev PA-C Nam Ho, MD Pediatrics   SUZANNE Glass, MD Pau Leonard CNP, MD Deborah Mielke, MD Kim Thein, APRN CNP      Clinic hours: Monday  "- Thursday 7 am-7 pm; Fridays 7 am-5 pm.   Urgent care: Monday - Friday 11 am-9 pm; Saturday and Sunday 9 am-5 pm.  Pharmacy : Monday -Thursday 8 am-8 pm; Friday 8 am-6 pm; Saturday and Sunday 9 am-5 pm.     Clinic: [phone]   Pharmacy: [phone]                Follow-ups after your visit        Follow-up notes from your care team     Return if symptoms worsen or fail to improve.      Who to contact     If you have questions or need follow up information about today's clinic visit or your schedule please contact [facility] directly at [phone].  Normal or non-critical lab and imaging results will be communicated to you by MyChart, letter or phone within 4 business days after the clinic has received the results. If you do not hear from us within 7 days, please contact the clinic through [employer] or phone. If you have a critical or abnormal lab result, we will notify you by phone as soon as possible.  Submit refill requests through [employer] or call your pharmacy and they will forward the refill request to us. Please allow 3 business days for your refill to be completed.          Additional Information About Your Visit        [employer] Information     [employer] gives you secure access to your electronic health record. If you see a primary care provider, you can also send messages to your care team and make appointments. If you have questions, please call your primary care clinic.  If you do not have a primary care provider, please call [phone] and they will assist you.        Care EveryWhere ID     This is your Care EveryWhere ID. This could be used by other organizations to access your [place] medical records  [id number]        Your Vitals Were     Pulse Temperature Height Pulse Oximetry BMI (Body Mass Index)       82 97.6  F (36.4  C) (Oral) 1.765 m (5' 9.5\") 98% 27.22 kg/m2        Blood Pressure from Last 3 Encounters:   11/02/17 127/78   09/15/17 120/74   08/01/17 136/86    " Weight from Last 3 Encounters:   11/02/17 84.8 kg (187 lb)   09/15/17 87.4 kg (192 lb 9.6 oz)   08/01/17 87.5 kg (193 lb)              We Performed the Following          ADMIN VACCINE, FIRST [23277]     HC FLU VAC PRESRV FREE QUAD SPLIT VIR 3+YRS IM  [86374]     TD (ADULT, 7+) PRESERVE FREE          Today's Medication Changes          These changes are accurate as of: 11/2/17  5:48 PM.  If you have any questions, ask your nurse or doctor.               Start taking these medicines.        Dose/Directions    cephALEXin 500 MG capsule   Commonly known as:  KEFLEX   Used for:  Lip laceration, initial encounter   Started by:  Aris Garcia MD        Dose:  500 mg   Take 1 capsule (500 mg) by mouth 2 times daily for 7 days for infection prevention.   Quantity:  14 capsule   Refills:  0            Where to get your medicines      These medications were sent to [facility] - [place], [place] - [address]  [address], [facility] [address]     Phone:  [phone]     cephALEXin 500 MG capsule                Primary Care Provider Office Phone # Fax #    [facility] [place] [facility] [phone]       [address] [facility] [address]        Equal Access to Services     DAMI JOHNS AH: Hadii aad ku hadasho Soomaali, waaxda luqadaha, qaybta kaalmada adeegyada, waxay idiin hayaan shelton khsteffanie clayton. So [facility] [phone].    ATENCIÓN: Si habla español, tiene a reyes disposición servicios gratuitos de asistencia lingüística. Llame al [phone].    We comply with applicable federal civil rights laws and [place] laws. We do not discriminate on the basis of race, color, national origin, age, disability, sex, sexual orientation, or gender identity.            Thank you!     Thank you for choosing [facility]  for your care. Our goal is always to provide you with excellent care. Hearing back from our patients is one way we can continue to  improve our services. Please take a few minutes to complete the written survey that you may receive in the mail after your visit with us. Thank you!             Your Updated Medication List - Protect others around you: Learn how to safely use, store and throw away your medicines at www.disposemymeds.org.          This list is accurate as of: 11/2/17  5:48 PM.  Always use your most recent med list.                   Brand Name Dispense Instructions for use Diagnosis    cephALEXin 500 MG capsule    KEFLEX    14 capsule    Take 1 capsule (500 mg) by mouth 2 times daily for 7 days for infection prevention.    Lip laceration, initial encounter       DAILY VITAMINS PO      Take 1 tablet by mouth daily        FLUoxetine 20 MG capsule    PROzac    90 capsule    Take 1 capsule (20 mg) by mouth daily    Moderate episode of recurrent major depressive disorder (H)       lisinopril-hydrochlorothiazide 20-25 MG per tablet    PRINZIDE/ZESTORETIC    90 tablet    Take 1 tablet by mouth daily for blood pressure.    Essential hypertension with goal blood pressure less than 140/90       simvastatin 40 MG tablet    ZOCOR    90 tablet    Take 1 tablet (40 mg) by mouth every evening for cholesterol.    Hyperlipidemia LDL goal <130       traZODone 50 MG tablet    DESYREL    90 tablet    Take 1 tablet (50 mg) by mouth At Bedtime for depression and sleep.    Primary insomnia, Major depressive disorder, recurrent episode, mild (H)

## 2017-11-02 NOTE — TELEPHONE ENCOUNTER
"Patient is scheduled to be seen today at 5:20pm with Dr. Garcia for cut lip. I was asked to triage patient.     Patient contacted. I received a vague account in which patient reports \"blacking out\" in the middle of the night for about 15 seconds on his way to the bathroom. He reports he did not fall. He never did go to the bathroom, but got back in bed and noticed his pillow case was wet. He then realized he had cut his upper lip (patient could not describe to me exactly where). He said \"under my mustache\" and his lip is swollen. He describes the cut as 1 inch long and he put neosporin on it. It continues to ooze. He is concerned about infection. Patient can speak and eat and drink without difficulty. He denies any vision or speech changes, confusion, headache, dizziness, weakness, or numbness/tingling. He does reference recently seeing Dr. Dobbs and needing to follow up with [facility] about a CT scan which told him he had a \"small bleed in his head.\"     Routing to providers as ANUMI.   Also of note - chart review reflects patient drinking a pint of vodka every day.    Brinda Coffey RN        "

## 2017-11-02 NOTE — NURSING NOTE
Injectable Influenza Immunization Documentation    1.  Are you sick today? (Fever of 100.5 or higher on the day of the clinic)   No    2.  Have you ever had Guillain-Kimball Syndrome within 6 weeks of an influenza vaccionation?  No    3. Do you have a life-threatening allergy to eggs?  No    4. Do you have a life-threatening allergy to a component of the vaccine? May include antibiotics, gelatin or latex.  No     5. Have you ever had a reaction to a dose of flu vaccine that needed immediate medical attention?  No     Form completed by Tommie Mederos MA      Screening Questionnaire for Adult Immunization    Are you sick today?   No   Do you have allergies to medications, food, a vaccine component or latex?   No   Have you ever had a serious reaction after receiving a vaccination?   No   Do you have a long-term health problem with heart disease, lung disease, asthma, kidney disease, metabolic disease (e.g. diabetes), anemia, or other blood disorder?   No   Do you have cancer, leukemia, HIV/AIDS, or any other immune system problem?   No   In the past 3 months, have you taken medications that affect  your immune system, such as prednisone, other steroids, or anticancer drugs; drugs for the treatment of rheumatoid arthritis, Crohn s disease, or psoriasis; or have you had radiation treatments?   No   Have you had a seizure, or a brain or other nervous system problem?   Yes   During the past year, have you received a transfusion of blood or blood     products, or been given immune (gamma) globulin or antiviral drug?   No   For women: Are you pregnant or is there a chance you could become        pregnant during the next month?   No   Have you received any vaccinations in the past 4 weeks?   No     Immunization questionnaire was positive for at least one answer.  Notified Dr. Garcia.         Screening performed by Tommie Mederos on 11/2/2017 at 6:10 PM.

## 2017-11-02 NOTE — NURSING NOTE
"Chief Complaint   Patient presents with     Mouth/Lip Problem     cut lip       Initial /78 (BP Location: Left arm, Patient Position: Chair, Cuff Size: Adult Regular)  Pulse 82  Temp 97.6  F (36.4  C) (Oral)  Ht 5' 9.5\" (1.765 m)  Wt 187 lb (84.8 kg)  SpO2 98%  BMI 27.22 kg/m2 Estimated body mass index is 27.22 kg/(m^2) as calculated from the following:    Height as of this encounter: 5' 9.5\" (1.765 m).    Weight as of this encounter: 187 lb (84.8 kg).  Medication Reconciliation: complete     Pa Willie Mederos MA      "

## 2017-11-02 NOTE — PROGRESS NOTES
"  SUBJECTIVE:   Ed Rudolph is a 57 year old male who presents to clinic today for the following health issues:    Concern - lip  Onset: this morning 1:30AM    Description:   Had a couple drinks night of 11/1, took trazodone, blacked out and woke up in the middle of the night, fell, cut lip, don't remember exactly how lip was cut, first noticed in the morning when he saw blood     Intensity: severe    Progression of Symptoms:  worsening    Accompanying Signs & Symptoms:  Painful and swelling    Previous history of similar problem:       Precipitating factors:   Worsened by:     Alleviating factors:  Improved by:     Therapies Tried and outcome: triple antibiotic, no relief      Drinking:  Patient states he drinks about 2 drinks per night (vodka cranberry). At one point, he confirms he was drinking a pint of vodka a day, but he was feeling depressed at that time and no longer drinks this amount.     Past medical, family, and social histories, medications, and allergies are reviewed and updated in Epic.     ROS:  C: NEGATIVE for fever, chills, change in weight  E/M: NEGATIVE for ear, mouth and throat problems  R: NEGATIVE for significant cough or SOB  CV: NEGATIVE for chest pain, palpitations or peripheral edema  ROS otherwise negative    This document serves as a record of the services and decisions personally performed and made by Dr. Garcia. It was created on his behalf by Sonya Collier, a trained medical scribe. The creation of this document is based the provider's statements to the medical scribe.  Sonya Collier November 2, 2017 5:37 PM     OBJECTIVE:                                                    /78 (BP Location: Left arm, Patient Position: Chair, Cuff Size: Adult Regular)  Pulse 82  Temp 97.6  F (36.4  C) (Oral)  Ht 1.765 m (5' 9.5\")  Wt 84.8 kg (187 lb)  SpO2 98%  BMI 27.22 kg/m2   Body mass index is 27.22 kg/(m^2).     GENERAL: healthy, alert and no distress  EYES: Eyes grossly " normal to inspection, PERRL, EOMI, sclerae white and conjunctivae normal  MS: no gross musculoskeletal defects noted, no edema  SKIN: A linear superficial laceration above the upper lip to the right of the philtrum. It is about 4 mm at its widest.   NEURO: Normal strength and tone, sensory exam grossly normal, mentation intact, oriented times 3 and cranial nerves 2-12 intact  PSYCH: mentation appears normal, affect normal/bright     Diagnostic Test Results:  none      ASSESSMENT/PLAN:                                                      (S01.511A) Lip laceration, initial encounter  (primary encounter diagnosis)  Comment: Clean, narrow, through the moustache. Too shallow and too old for repair with sutures, and the scab that has already developed prevents using something such as DermaBond as well. We can go ahead and use an antibiotic to prevent an infection.   Plan: cephALEXin (KEFLEX) 500 MG capsule        Return if symptoms worsen or fail to improve.     (Z23) Need for prophylactic vaccination and inoculation against influenza  Comment: Influenza vaccine offered and accepted by patient. He has received it before without problems.   Plan: HC FLU VAC PRESRV FREE QUAD SPLIT VIR 3+YRS IM         [10440],      ADMIN VACCINE, FIRST [96544]            (Z23) Need for TD vaccine  Comment:   Plan: TD (ADULT, 7+) PRESERVE FREE            The information in this document, created by the medical scribe for me, accurately reflects the services I personally performed and the decisions made by me. I have reviewed and approved this document for accuracy prior to leaving the patient care area. November 2, 2017 5:37 PM   Aris Garcia MD

## 2017-11-02 NOTE — PATIENT INSTRUCTIONS
At [facility], we strive to deliver an exceptional experience to you, every time we see you.  If you receive a survey in the mail, please send us back your thoughts. We really do value your feedback.    Based on your medical history, these are the current health maintenance/preventive care services that you are due for (some may have been done at this visit.)  Health Maintenance Due   Topic Date Due     COLON CANCER SCREEN (SYSTEM ASSIGNED)  05/07/2011     ADVANCE DIRECTIVE PLANNING Q5 YRS  05/07/2016     INFLUENZA VACCINE (SYSTEM ASSIGNED)  09/01/2017         Suggested websites for health information:  Www.Bridg.Zeuss : Up to date and easily searchable information on multiple topics.  Www.medlineplus.gov : medication info, interactive tutorials, watch real surgeries online  Www.familydoctor.org : good info from the Academy of Family Physicians  Www.cdc.gov : public health info, travel advisories, epidemics (H1N1)  Www.aap.org : children's health info, normal development, vaccinations  Www.health.[facility].[place].us : [place] dept of health, public health issues in [place], N1N1    Your care team:                            Family Medicine Internal Medicine   MD Marquez Dunaway MD Shantel Branch-Fleming, MD Katya Georgiev PA-C Nam Ho, MD Pediatrics   SUZANNE Glass, MD Pau Leonard CNP, MD Deborah Mielke, MD Kim Thein, APRN CNP      Clinic hours: Monday - Thursday 7 am-7 pm; Fridays 7 am-5 pm.   Urgent care: Monday - Friday 11 am-9 pm; Saturday and Sunday 9 am-5 pm.  Pharmacy : Monday -Thursday 8 am-8 pm; Friday 8 am-6 pm; Saturday and Sunday 9 am-5 pm.     Clinic: [phone]   Pharmacy: [phone]

## 2017-11-09 ENCOUNTER — TRANSFERRED RECORDS (OUTPATIENT)
Dept: HEALTH INFORMATION MANAGEMENT | Facility: CLINIC | Age: 57
End: 2017-11-09

## 2018-01-09 ENCOUNTER — TELEPHONE (OUTPATIENT)
Dept: FAMILY MEDICINE | Facility: CLINIC | Age: 58
End: 2018-01-09

## 2018-01-09 NOTE — TELEPHONE ENCOUNTER
Panel Management Review        Last Office Visit with this department:     Fail List measure:     Depression / Dysthymia review    Measure:  Needs PHQ-9 score of 4 or less during index window.  Administer PHQ-9 and if score is 5 or more, send encounter to provider for next steps.    5 - 7 month window range:     INDEX 8/1/17    FU START 1/1/18  FU END 3/1/18    PHQ-9 SCORE 7/28/2017 8/1/2017 9/15/2017   Total Score 6 15 7       If PHQ-9 recheck is 5 or more, route to provider for next steps.    Patient is due for:  PHQ9        Patient is due/failing the following:   PHQ9    Action needed:   Patient needs to do PHQ9.    Type of outreach:    Sent [employer] message.    Questions for provider review:    None                                                                                   AMERICA Cat MA

## 2018-01-27 ENCOUNTER — DOCUMENTATION ONLY (OUTPATIENT)
Dept: FAMILY MEDICINE | Facility: CLINIC | Age: 58
End: 2018-01-27

## 2018-01-27 DIAGNOSIS — I10 ESSENTIAL HYPERTENSION WITH GOAL BLOOD PRESSURE LESS THAN 140/90: ICD-10-CM

## 2018-01-27 DIAGNOSIS — E78.5 HYPERLIPIDEMIA LDL GOAL <100: Primary | ICD-10-CM

## 2018-02-02 DIAGNOSIS — E78.5 HYPERLIPIDEMIA LDL GOAL <100: ICD-10-CM

## 2018-02-02 DIAGNOSIS — I10 ESSENTIAL HYPERTENSION WITH GOAL BLOOD PRESSURE LESS THAN 140/90: ICD-10-CM

## 2018-02-02 LAB
ALT SERPL W P-5'-P-CCNC: 50 U/L (ref 0–70)
CHOLEST SERPL-MCNC: 155 MG/DL
CREAT SERPL-MCNC: 1.27 MG/DL (ref 0.66–1.25)
GFR SERPL CREATININE-BSD FRML MDRD: 59 ML/MIN/1.7M2
HDLC SERPL-MCNC: 68 MG/DL
LDLC SERPL CALC-MCNC: 51 MG/DL
NONHDLC SERPL-MCNC: 87 MG/DL
POTASSIUM SERPL-SCNC: 4.3 MMOL/L (ref 3.4–5.3)
TRIGL SERPL-MCNC: 181 MG/DL

## 2018-02-02 PROCEDURE — 82565 ASSAY OF CREATININE: CPT | Performed by: FAMILY MEDICINE

## 2018-02-02 PROCEDURE — 84460 ALANINE AMINO (ALT) (SGPT): CPT | Performed by: FAMILY MEDICINE

## 2018-02-02 PROCEDURE — 84132 ASSAY OF SERUM POTASSIUM: CPT | Performed by: FAMILY MEDICINE

## 2018-02-02 PROCEDURE — 80061 LIPID PANEL: CPT | Performed by: FAMILY MEDICINE

## 2018-02-02 PROCEDURE — 36415 COLL VENOUS BLD VENIPUNCTURE: CPT | Performed by: FAMILY MEDICINE

## 2018-02-05 ENCOUNTER — TELEPHONE (OUTPATIENT)
Dept: FAMILY MEDICINE | Facility: CLINIC | Age: 58
End: 2018-02-05

## 2018-02-05 NOTE — TELEPHONE ENCOUNTER
The cholesterol levels are significantly improved from last check:    Your LDL (bad cholesterol) was at goal and improved from last check.   Your HDL (good cholesterol) was normal and improved.   Your triglycerides were significantly improved but still mildly above normal.  Poor diet, genetics and being overweight can contribute to high triglyceride levels.    You need to recheck fasting labs every 6 months.   Maintaining a healthy diet with lean proteins, whole grains and healthy fats such as olive oil as well as regular exercise and maintaining an appropriate weight all contribute to healthier cholesterol levels.

## 2018-02-05 NOTE — TELEPHONE ENCOUNTER
Reason for Call:  Other call back    Detailed comments: patient is requesting a call back from care to go over his lab Results.     Phone Number Patient can be reached at: Home number on file [phone] (home)    Best Time: anytime     Can we leave a detailed message on this number? YES    Call taken on 2/5/2018 at 2:42 PM by Pablo Xavier

## 2018-02-06 ENCOUNTER — OFFICE VISIT (OUTPATIENT)
Dept: FAMILY MEDICINE | Facility: CLINIC | Age: 58
End: 2018-02-06
Payer: COMMERCIAL

## 2018-02-06 VITALS
WEIGHT: 181 LBS | TEMPERATURE: 96.9 F | SYSTOLIC BLOOD PRESSURE: 134 MMHG | BODY MASS INDEX: 25.91 KG/M2 | DIASTOLIC BLOOD PRESSURE: 78 MMHG | OXYGEN SATURATION: 96 % | HEART RATE: 84 BPM | HEIGHT: 70 IN

## 2018-02-06 DIAGNOSIS — E78.5 HYPERLIPIDEMIA LDL GOAL <130: ICD-10-CM

## 2018-02-06 DIAGNOSIS — F33.0 MILD EPISODE OF RECURRENT MAJOR DEPRESSIVE DISORDER (H): ICD-10-CM

## 2018-02-06 DIAGNOSIS — I10 ESSENTIAL HYPERTENSION WITH GOAL BLOOD PRESSURE LESS THAN 140/90: ICD-10-CM

## 2018-02-06 DIAGNOSIS — D64.9 ANEMIA, UNSPECIFIED TYPE: ICD-10-CM

## 2018-02-06 DIAGNOSIS — R63.4 WEIGHT LOSS: ICD-10-CM

## 2018-02-06 DIAGNOSIS — Z12.11 SCREEN FOR COLON CANCER: ICD-10-CM

## 2018-02-06 DIAGNOSIS — Z00.00 ENCOUNTER FOR ROUTINE ADULT HEALTH EXAMINATION WITHOUT ABNORMAL FINDINGS: Primary | ICD-10-CM

## 2018-02-06 DIAGNOSIS — F51.01 PRIMARY INSOMNIA: ICD-10-CM

## 2018-02-06 PROCEDURE — 36415 COLL VENOUS BLD VENIPUNCTURE: CPT | Performed by: FAMILY MEDICINE

## 2018-02-06 PROCEDURE — 83550 IRON BINDING TEST: CPT | Performed by: FAMILY MEDICINE

## 2018-02-06 PROCEDURE — 82668 ASSAY OF ERYTHROPOIETIN: CPT | Mod: 90 | Performed by: FAMILY MEDICINE

## 2018-02-06 PROCEDURE — 99396 PREV VISIT EST AGE 40-64: CPT | Performed by: FAMILY MEDICINE

## 2018-02-06 PROCEDURE — 83540 ASSAY OF IRON: CPT | Performed by: FAMILY MEDICINE

## 2018-02-06 PROCEDURE — 80050 GENERAL HEALTH PANEL: CPT | Performed by: FAMILY MEDICINE

## 2018-02-06 PROCEDURE — 85045 AUTOMATED RETICULOCYTE COUNT: CPT | Performed by: FAMILY MEDICINE

## 2018-02-06 PROCEDURE — 82607 VITAMIN B-12: CPT | Performed by: FAMILY MEDICINE

## 2018-02-06 PROCEDURE — 99000 SPECIMEN HANDLING OFFICE-LAB: CPT | Performed by: FAMILY MEDICINE

## 2018-02-06 PROCEDURE — 82746 ASSAY OF FOLIC ACID SERUM: CPT | Performed by: FAMILY MEDICINE

## 2018-02-06 PROCEDURE — 82728 ASSAY OF FERRITIN: CPT | Performed by: FAMILY MEDICINE

## 2018-02-06 PROCEDURE — 85060 BLOOD SMEAR INTERPRETATION: CPT | Performed by: FAMILY MEDICINE

## 2018-02-06 PROCEDURE — 83921 ORGANIC ACID SINGLE QUANT: CPT | Mod: 90 | Performed by: FAMILY MEDICINE

## 2018-02-06 PROCEDURE — 83021 HEMOGLOBIN CHROMOTOGRAPHY: CPT | Mod: 90 | Performed by: FAMILY MEDICINE

## 2018-02-06 RX ORDER — SIMVASTATIN 40 MG
40 TABLET ORAL EVERY EVENING
Qty: 90 TABLET | Refills: 1 | Status: SHIPPED | OUTPATIENT
Start: 2018-02-06 | End: 2019-01-17

## 2018-02-06 RX ORDER — LISINOPRIL AND HYDROCHLOROTHIAZIDE 20; 25 MG/1; MG/1
1 TABLET ORAL DAILY
Qty: 90 TABLET | Refills: 1 | Status: SHIPPED | OUTPATIENT
Start: 2018-02-06 | End: 2018-10-04

## 2018-02-06 RX ORDER — TRAZODONE HYDROCHLORIDE 50 MG/1
50 TABLET, FILM COATED ORAL AT BEDTIME
Qty: 90 TABLET | Refills: 1 | Status: SHIPPED | OUTPATIENT
Start: 2018-02-06 | End: 2018-10-04

## 2018-02-06 ASSESSMENT — ANXIETY QUESTIONNAIRES
IF YOU CHECKED OFF ANY PROBLEMS ON THIS QUESTIONNAIRE, HOW DIFFICULT HAVE THESE PROBLEMS MADE IT FOR YOU TO DO YOUR WORK, TAKE CARE OF THINGS AT HOME, OR GET ALONG WITH OTHER PEOPLE: NOT DIFFICULT AT ALL
3. WORRYING TOO MUCH ABOUT DIFFERENT THINGS: NOT AT ALL
7. FEELING AFRAID AS IF SOMETHING AWFUL MIGHT HAPPEN: NOT AT ALL
6. BECOMING EASILY ANNOYED OR IRRITABLE: NOT AT ALL
5. BEING SO RESTLESS THAT IT IS HARD TO SIT STILL: NOT AT ALL
GAD7 TOTAL SCORE: 1
2. NOT BEING ABLE TO STOP OR CONTROL WORRYING: NOT AT ALL
1. FEELING NERVOUS, ANXIOUS, OR ON EDGE: NOT AT ALL

## 2018-02-06 ASSESSMENT — PAIN SCALES - GENERAL: PAINLEVEL: NO PAIN (0)

## 2018-02-06 ASSESSMENT — PATIENT HEALTH QUESTIONNAIRE - PHQ9: 5. POOR APPETITE OR OVEREATING: SEVERAL DAYS

## 2018-02-06 NOTE — MR AVS SNAPSHOT
After Visit Summary   2/6/2018    Ed Rudolph    MRN: [id number]           Patient Information     Date Of Birth          1960        Visit Information        Provider Department      2/6/2018 4:40 PM Aris Garcia MD [facility]        Today's Diagnoses     Encounter for routine adult health examination without abnormal findings    -  1    Mild episode of recurrent major depressive disorder (H)        Weight loss        Anemia, unspecified type        Essential hypertension with goal blood pressure less than 140/90        Hyperlipidemia LDL goal <130        Primary insomnia        Screen for colon cancer          Care Instructions      Preventive Health Recommendations  Male Ages 50 - 64    Yearly exam:             See your health care provider every year in order to  o   Review health changes.   o   Discuss preventive care.    o   Review your medicines if your doctor has prescribed any.     Have a cholesterol test every 5 years, or more frequently if you are at risk for high cholesterol/heart disease.     Have a diabetes test (fasting glucose) every three years. If you are at risk for diabetes, you should have this test more often.     Have a colonoscopy at age 50, or have a yearly FIT test (stool test). These exams will check for colon cancer.      Talk with your health care provider about whether or not a prostate cancer screening test (PSA) is right for you.    You should be tested each year for STDs (sexually transmitted diseases), if you re at risk.     Shots: Get a flu shot each year. Get a tetanus shot every 10 years.     Nutrition:    Eat at least 5 servings of fruits and vegetables daily.     Eat whole-grain bread, whole-wheat pasta and brown rice instead of white grains and rice.     Talk to your provider about Calcium and Vitamin D.     Lifestyle    Aerobic exercise for at least 150 minutes a week (40+ minutes per day, 4-6 days per week). This will help you  control your weight and prevent disease.      Limit alcohol to one drink per day.     No smoking.     Wear sunscreen to prevent skin cancer.     See your dentist every six months for an exam and cleaning.     See your eye doctor every 1 to 2 years.     Low-Fiber Diet     Eggs are high in protein and easy to digest.   Eating a low-fiber diet means eating foods that don t have much fiber. These foods are easy to digest.  Most of the fiber that you eat passes undigested through your bowel. This is what forms stool. Low-fiber foods can help to slow down your bowel movements. When you eat a low-fiber diet, you have fewer stools. This lets your intestine rest.  Your healthcare provider will tell you how long you need to be on this diet. It may only be for a short time. Low-fiber foods often don t give you all the nutrients you need to stay healthy. Your healthcare provider may have you take certain vitamins while you are on this diet.  Reasons to eat a low-fiber diet  The goal of a low-fiber diet is to limit the size and number of your stools. It may be prescribed if you:    Are going through chemotherapy or radiation treatments    Have had intestinal surgery    Have a condition that affects your intestine, such as irritable bowel syndrome, Crohn s disease, ulcerative colitis, or diverticulitis  General guidelines for a low-fiber diet  In general, a low-fiber diet means having fewer than 13 grams of fiber a day. Your healthcare provider may give you a list of things you can and can t eat or drink. Read food labels. Choose foods and drinks that have as close to zero grams of fiber as possible. Here are general guidelines to follow:  Breads, pasta, cereal, rice, and other starches (6 to 11 servings daily)    What to choose: white bread, biscuits, muffins, and white rolls; plain crackers; waffles; white pasta; white rice; cream of wheat; grits; white pancakes; corn flakes; cooked potatoes without skin. Fiber content of these  foods should be less than 0.5 (1/2) gram per serving.    What to avoid: whole-wheat or whole-grain breads, crackers, and pasta; breads with seeds or nuts; wheat germ; corrie crackers; cornbread; wild or brown rice; cereals with whole-grain, bran, and granola; cereals with seeds, nuts, coconut, or dried fruit; potatoes with skin  Milk and dairy (2 servings daily)    What to choose: milk, buttermilk; yogurt or ice cream without seeds or nuts; custard or pudding; sour cream; cheese and cottage cheese    What to avoid: ice cream and yogurt with seeds, nuts, or fruit chunks  Fruit (2 to 4 servings daily)    What to choose: ripe banana; ripe nectarine, peach, apricot, papaya, and plum; soft honeydew melon and cantaloupe; cooked or canned fruit without skin or seeds (not sweetened with sorbitol); applesauce; strained fruit juice (without pulp)    What to avoid: raw or dried fruit; all berries; raisins; canned and raw pineapple; prunes and prune juice; fruit juice with pulp  Vegetables (3 to 5 servings daily)    What to choose: well-cooked or canned vegetables without seeds, such as spinach, eggplant, green and wax beans, carrots, yellow squash, pumpkin; lettuce on a sandwich    What to avoid: all raw or steamed vegetables; vegetables with seeds, such as unstrained tomato sauce; green peas; lima beans; broccoli; corn; parsnips  Meats and protein (4 to 6 ounces daily)    What to choose: tender, well-cooked meat, including ground meat, poultry, and fish; eggs; tofu; creamy peanut butter    What to avoid: tough, chewy meat with gristle; peas, including split, yellow, and black-eyed; beans, including navy, lima, black, garbanzo, soy, pak, and lentil; peanuts and crunchy peanut butter   Fats, oils, sauces, condiments (fewer than 8 teaspoons daily)    What to choose: butter, margarine, oils, whipped cream, sour cream, mayonnaise, smooth dressings and sauces; plain gravy; smooth condiments    What to avoid: dressing with seeds  or fruit chunks; pickles and relishes  Other foods and drinks    What to choose: water; plain gelatin; plain puddings; pretzels; plain cookies and cakes; honey, syrup; decaffeinated drinks, including tea and coffee      What to avoid: popcorn; potato chips; spicy foods; fried, greasy foods; alcohol (ask your healthcare provider); marmalade, jam, and preserves; desserts that have seeds, nuts, coconut, dried fruit, whole grains, or bran; candy that has seeds or nuts; drinks sweetened with sorbitol or other sugar substitutes; caffeinated drinks, including tea, coffee, soda, and energy drinks  Date Last Reviewed: 6/1/2017 2000-2017 [employer] [address] [place] [address]. All rights reserved. This information is not intended as a substitute for professional medical care. Always follow your healthcare professional's instructions.      Eating a Low-fiber Diet  What is fiber?  Fiber is the part of food that the body cannot digest. It helps form stools (bowel movements).   If you eat less fiber, you may:    Reduce belly pain, diarrhea (loose, watery stools) and other digestive problems    Have fewer and smaller stools    Decrease inflammation (pain, redness and swelling) in the GI (gastro-intestinal) tract    Promote healing in the GI tract.  For a list of foods allowed in a low-fiber diet, see the back of this page.  Why might I need a low-fiber diet?  You may need a low-fiber diet if you have:     Inflamed bowels    Crohn's disease    Diverticular disease    Ulcerative colitis    Radiation therapy to the belly area    Chemotherapy    An upcoming colonoscopy    Surgery on your intestines or in the belly area.  Sample Menu  Breakfast:   1 scrambled egg   1 slice white toast with 1 teaspoon margarine     cup Cream of Wheat with sugar     cup milk      cup pulp-free orange juice  Snack:     cup canned fruit cocktail (in juice)   6 saltine crackers  Lunch:   Tuna sandwich on white bread    1 cup  "cream of chicken soup     cup canned peaches (in light syrup)   1 cup lemonade  Snack:     cup cottage cheese   1 medium apple, sliced and peeled  Dinner:   3 ounces well-cooked chicken breast   1 cup white rice     cup cooked canned carrots   1 white dinner roll with 1 teaspoon margarine   1 slice lorie food cake   1 cup herbal tea  Food group Allowed Avoid   Grains Foods that contain refined white flour   (1 gram fiber or less per serving), such as bread, pasta, muffins, cereals, crackers, etc.; white rice; Cream of Wheat; Cream of Rice Whole grains (whole wheat bread, oatmeal, barley, brown or wild rice); foods containing nuts, seeds or bran   Vegetables Canned or well-cooked vegetables; mashed potatoes; non-gas-forming vegetables; vegetables without skin, seeds or pulp; vegetable juice ( 1/2 cup per day or less) Raw vegetables; cooked greens or spinach;   gas-forming vegetables (broccoli, cauliflower, brussels sprouts)   Fruits Peeled fresh fruit (bananas, apples, melons, nectarines); canned fruit (in juice or light syrup); fruit juice without pulp Dried fruit; fruit with pulp (oranges, grapefruit, pineapple); unpeeled fruit; prune juice   Meats and   other proteins Tender, well-cooked or ground meats; fish; eggs; tofu; smooth nut butters (peanut, soy, almond, sunflower) Crunchy nut butters; tough meats; meats with gristle (britt, sausage); dried beans or peas (legumes)   Milk products Milk, soy milk, rice milk, almond milk, coconut milk; yogurt, soy yogurt; cottage cheese, mild cheese; ice cream, sherbet If you are lactose intolerant: avoid milk, dairy products and foods made with milk  Note: Some people become lactose intolerant after surgery. This may or may not improve over time.   Other Salad dressings; oil, butter, margarine; jelly, honey, syrup Any food containing nuts or seeds; coconut; marmalade; carbonated (\"fizzy\") drinks   For informational purposes only. Not to replace the advice of your health care " provider.   Copyright   2007 [facility]. All rights reserved. [employer] 262933 - REV 09/15.            Follow-ups after your visit        Additional Services     GASTROENTEROLOGY ADULT REF PROCEDURE ONLY [facility] [phone]; [facility]       Last Lab Result: Creatinine (mg/dL)       Date                     Value                 02/02/2018               1.27 (H)         ----------  Body mass index is 26.35 kg/(m^2).     Needed:  No  Language:  English    Patient will be contacted to schedule the colonoscopy, or call the Specialty Scheduling Line [phone].      Please be aware that coverage of these services is subject to the terms and limitations of your health insurance plan.  Call member services at your health plan with any benefit or coverage questions.  Any procedures must be performed at a [place] facility OR coordinated by your clinic's referral office.    Please bring the following with you to your appointment:    (1) Any X-Rays, CTs or MRIs which have been performed.  Contact the facility where they were done to arrange for  prior to your scheduled appointment.    (2) List of current medications   (3) This referral request   (4) Any documents/labs given to you for this referral                  Follow-up notes from your care team     Return in about 5 months (around 7/20/2018) for blood pressure check, cholesterol, recheck medication, recheck depression.      Who to contact     If you have questions or need follow up information about today's clinic visit or your schedule please contact [facility] directly at [phone].  Normal or non-critical lab and imaging results will be communicated to you by MyChart, letter or phone within 4 business days after the clinic has received the results. If you do not hear from us within 7 days, please contact the clinic through MyChart or phone. If you have a critical or abnormal lab  "result, we will notify you by phone as soon as possible.  Submit refill requests through [employer] or call your pharmacy and they will forward the refill request to us. Please allow 3 business days for your refill to be completed.          Additional Information About Your Visit        [employer] Information     [employer] gives you secure access to your electronic health record. If you see a primary care provider, you can also send messages to your care team and make appointments. If you have questions, please call your primary care clinic.  If you do not have a primary care provider, please call [phone] and they will assist you.        Care EveryWhere ID     This is your Care EveryWhere ID. This could be used by other organizations to access your [place] medical records  [id number]        Your Vitals Were     Pulse Temperature Height Pulse Oximetry BMI (Body Mass Index)       84 96.9  F (36.1  C) (Oral) 5' 9.5\" (1.765 m) 96% 26.35 kg/m2        Blood Pressure from Last 3 Encounters:   02/06/18 134/78   11/02/17 127/78   09/15/17 120/74    Weight from Last 3 Encounters:   02/06/18 181 lb (82.1 kg)   11/02/17 187 lb (84.8 kg)   09/15/17 192 lb 9.6 oz (87.4 kg)              We Performed the Following     Blood Morphology Pathologist Review     CBC with platelets differential     Comprehensive metabolic panel (BMP + Alb, Alk Phos, ALT, AST, Total. Bili, TP)     Erythropoietin     Ferritin     Folate     GASTROENTEROLOGY ADULT REF PROCEDURE ONLY [facility] [phone]; [facility]     HGB Eval Reflex to ELP or RBC Solubility     Iron and iron binding capacity     Methylmalonic acid     Reticulocyte Count     TSH with free T4 reflex     Vitamin B12          Today's Medication Changes          These changes are accurate as of 2/6/18  5:14 PM.  If you have any questions, ask your nurse or doctor.               These medicines have changed or have updated prescriptions.        Dose/Directions    " FLUoxetine 20 MG capsule   Commonly known as:  PROzac   This may have changed:  See the new instructions.   Used for:  Weight loss   Changed by:  Aris Garcia MD        Dose:  20 mg   Take 1 capsule (20 mg) by mouth daily for depression.   Quantity:  90 capsule   Refills:  1            Where to get your medicines      These medications [place] [facility] [place] - [address]  [address], [facility] [address]     Phone:  [phone]     FLUoxetine 20 MG capsule    lisinopril-hydrochlorothiazide 20-25 MG per tablet    simvastatin 40 MG tablet    traZODone 50 MG tablet                Primary Care Provider Office Phone # Fax #    [facility] [phone]       [address] [facility] [address]        Equal Access to Services     [facility] : Hadii ronny ledbetter hadasho Soomaali, waaxda luqadaha, qaybta kaalmada adeegyada, waldo idiin hayaaabraham granados . So [facility] [phone].    ATENCIÓN: Si habla español, tiene a reyes disposición servicios gratuitos de asistencia lingüística. Llame al [phone].    We comply with applicable federal civil rights laws and [place] laws. We do not discriminate on the basis of race, color, national origin, age, disability, sex, sexual orientation, or gender identity.            Thank you!     Thank you for choosing [facility]  for your care. Our goal is always to provide you with excellent care. Hearing back from our patients is one way we can continue to improve our services. Please take a few minutes to complete the written survey that you may receive in the mail after your visit with us. Thank you!             Your Updated Medication List - Protect others around you: Learn how to safely use, store and throw away your medicines at www.disposemymeds.org.          This list is accurate as of 2/6/18  5:14 PM.  Always use your most recent med list.                    Brand Name Dispense Instructions for use Diagnosis    DAILY VITAMINS PO      Take 1 tablet by mouth daily        FLUoxetine 20 MG capsule    PROzac    90 capsule    Take 1 capsule (20 mg) by mouth daily for depression.    Weight loss       lisinopril-hydrochlorothiazide 20-25 MG per tablet    PRINZIDE/ZESTORETIC    90 tablet    Take 1 tablet by mouth daily for blood pressure.    Essential hypertension with goal blood pressure less than 140/90       simvastatin 40 MG tablet    ZOCOR    90 tablet    Take 1 tablet (40 mg) by mouth every evening for cholesterol.    Hyperlipidemia LDL goal <130       traZODone 50 MG tablet    DESYREL    90 tablet    Take 1 tablet (50 mg) by mouth At Bedtime for depression and sleep.    Primary insomnia, Weight loss

## 2018-02-06 NOTE — PATIENT INSTRUCTIONS
Preventive Health Recommendations  Male Ages 50   64    Yearly exam:             See your health care provider every year in order to  o   Review health changes.   o   Discuss preventive care.    o   Review your medicines if your doctor has prescribed any.     Have a cholesterol test every 5 years, or more frequently if you are at risk for high cholesterol/heart disease.     Have a diabetes test (fasting glucose) every three years. If you are at risk for diabetes, you should have this test more often.     Have a colonoscopy at age 50, or have a yearly FIT test (stool test). These exams will check for colon cancer.      Talk with your health care provider about whether or not a prostate cancer screening test (PSA) is right for you.    You should be tested each year for STDs (sexually transmitted diseases), if you re at risk.     Shots: Get a flu shot each year. Get a tetanus shot every 10 years.     Nutrition:    Eat at least 5 servings of fruits and vegetables daily.     Eat whole-grain bread, whole-wheat pasta and brown rice instead of white grains and rice.     Talk to your provider about Calcium and Vitamin D.     Lifestyle    Aerobic exercise for at least 150 minutes a week (40+ minutes per day, 4-6 days per week). This will help you control your weight and prevent disease.      Limit alcohol to one drink per day.     No smoking.     Wear sunscreen to prevent skin cancer.     See your dentist every six months for an exam and cleaning.     See your eye doctor every 1 to 2 years.     Low-Fiber Diet     Eggs are high in protein and easy to digest.   Eating a low-fiber diet means eating foods that don t have much fiber. These foods are easy to digest.  Most of the fiber that you eat passes undigested through your bowel. This is what forms stool. Low-fiber foods can help to slow down your bowel movements. When you eat a low-fiber diet, you have fewer stools. This lets your intestine rest.  Your healthcare provider  will tell you how long you need to be on this diet. It may only be for a short time. Low-fiber foods often don t give you all the nutrients you need to stay healthy. Your healthcare provider may have you take certain vitamins while you are on this diet.  Reasons to eat a low-fiber diet  The goal of a low-fiber diet is to limit the size and number of your stools. It may be prescribed if you:    Are going through chemotherapy or radiation treatments    Have had intestinal surgery    Have a condition that affects your intestine, such as irritable bowel syndrome, Crohn s disease, ulcerative colitis, or diverticulitis  General guidelines for a low-fiber diet  In general, a low-fiber diet means having fewer than 13 grams of fiber a day. Your healthcare provider may give you a list of things you can and can t eat or drink. Read food labels. Choose foods and drinks that have as close to zero grams of fiber as possible. Here are general guidelines to follow:  Breads, pasta, cereal, rice, and other starches (6 to 11 servings daily)    What to choose: white bread, biscuits, muffins, and white rolls; plain crackers; waffles; white pasta; white rice; cream of wheat; grits; white pancakes; corn flakes; cooked potatoes without skin. Fiber content of these foods should be less than 0.5 (1/2) gram per serving.    What to avoid: whole-wheat or whole-grain breads, crackers, and pasta; breads with seeds or nuts; wheat germ; corrie crackers; cornbread; wild or brown rice; cereals with whole-grain, bran, and granola; cereals with seeds, nuts, coconut, or dried fruit; potatoes with skin  Milk and dairy (2 servings daily)    What to choose: milk, buttermilk; yogurt or ice cream without seeds or nuts; custard or pudding; sour cream; cheese and cottage cheese    What to avoid: ice cream and yogurt with seeds, nuts, or fruit chunks  Fruit (2 to 4 servings daily)    What to choose: ripe banana; ripe nectarine, peach, apricot, papaya, and plum;  soft honeydew melon and cantaloupe; cooked or canned fruit without skin or seeds (not sweetened with sorbitol); applesauce; strained fruit juice (without pulp)    What to avoid: raw or dried fruit; all berries; raisins; canned and raw pineapple; prunes and prune juice; fruit juice with pulp  Vegetables (3 to 5 servings daily)    What to choose: well-cooked or canned vegetables without seeds, such as spinach, eggplant, green and wax beans, carrots, yellow squash, pumpkin; lettuce on a sandwich    What to avoid: all raw or steamed vegetables; vegetables with seeds, such as unstrained tomato sauce; green peas; lima beans; broccoli; corn; parsnips  Meats and protein (4 to 6 ounces daily)    What to choose: tender, well-cooked meat, including ground meat, poultry, and fish; eggs; tofu; creamy peanut butter    What to avoid: tough, chewy meat with gristle; peas, including split, yellow, and black-eyed; beans, including navy, lima, black, garbanzo, soy, pak, and lentil; peanuts and crunchy peanut butter   Fats, oils, sauces, condiments (fewer than 8 teaspoons daily)    What to choose: butter, margarine, oils, whipped cream, sour cream, mayonnaise, smooth dressings and sauces; plain gravy; smooth condiments    What to avoid: dressing with seeds or fruit chunks; pickles and relishes  Other foods and drinks    What to choose: water; plain gelatin; plain puddings; pretzels; plain cookies and cakes; honey, syrup; decaffeinated drinks, including tea and coffee      What to avoid: popcorn; potato chips; spicy foods; fried, greasy foods; alcohol (ask your healthcare provider); marmalade, jam, and preserves; desserts that have seeds, nuts, coconut, dried fruit, whole grains, or bran; candy that has seeds or nuts; drinks sweetened with sorbitol or other sugar substitutes; caffeinated drinks, including tea, coffee, soda, and energy drinks  Date Last Reviewed: 6/1/2017 2000-2017 [employer] [address] [facility],  [place] [address]. All rights reserved. This information is not intended as a substitute for professional medical care. Always follow your healthcare professional's instructions.      Eating a Low-fiber Diet  What is fiber?  Fiber is the part of food that the body cannot digest. It helps form stools (bowel movements).   If you eat less fiber, you may:    Reduce belly pain, diarrhea (loose, watery stools) and other digestive problems    Have fewer and smaller stools    Decrease inflammation (pain, redness and swelling) in the GI (gastro-intestinal) tract    Promote healing in the GI tract.  For a list of foods allowed in a low-fiber diet, see the back of this page.  Why might I need a low-fiber diet?  You may need a low-fiber diet if you have:     Inflamed bowels    Crohn's disease    Diverticular disease    Ulcerative colitis    Radiation therapy to the belly area    Chemotherapy    An upcoming colonoscopy    Surgery on your intestines or in the belly area.  Sample Menu  Breakfast:   1 scrambled egg   1 slice white toast with 1 teaspoon margarine     cup Cream of Wheat with sugar     cup milk      cup pulp-free orange juice  Snack:     cup canned fruit cocktail (in juice)   6 saltine crackers  Lunch:   Tuna sandwich on white bread    1 cup cream of chicken soup     cup canned peaches (in light syrup)   1 cup lemonade  Snack:     cup cottage cheese   1 medium apple, sliced and peeled  Dinner:   3 ounces well-cooked chicken breast   1 cup white rice     cup cooked canned carrots   1 white dinner roll with 1 teaspoon margarine   1 slice lorie food cake   1 cup herbal tea  Food group Allowed Avoid   Grains Foods that contain refined white flour   (1 gram fiber or less per serving), such as bread, pasta, muffins, cereals, crackers, etc.; white rice; Cream of Wheat; Cream of Rice Whole grains (whole wheat bread, oatmeal, barley, brown or wild rice); foods containing nuts, seeds or bran   Vegetables Canned or well-cooked  "vegetables; mashed potatoes; non-gas-forming vegetables; vegetables without skin, seeds or pulp; vegetable juice ( 1/2 cup per day or less) Raw vegetables; cooked greens or spinach;   gas-forming vegetables (broccoli, cauliflower, brussels sprouts)   Fruits Peeled fresh fruit (bananas, apples, melons, nectarines); canned fruit (in juice or light syrup); fruit juice without pulp Dried fruit; fruit with pulp (oranges, grapefruit, pineapple); unpeeled fruit; prune juice   Meats and   other proteins Tender, well-cooked or ground meats; fish; eggs; tofu; smooth nut butters (peanut, soy, almond, sunflower) Crunchy nut butters; tough meats; meats with gristle (britt, sausage); dried beans or peas (legumes)   Milk products Milk, soy milk, rice milk, almond milk, coconut milk; yogurt, soy yogurt; cottage cheese, mild cheese; ice cream, sherbet If you are lactose intolerant: avoid milk, dairy products and foods made with milk  Note: Some people become lactose intolerant after surgery. This may or may not improve over time.   Other Salad dressings; oil, butter, margarine; jelly, honey, syrup Any food containing nuts or seeds; coconut; marmalade; carbonated (\"fizzy\") drinks   For informational purposes only. Not to replace the advice of your health care provider.   Copyright   2007 [facility]. All rights reserved. [employer] [id number]   REV 09/15.    "

## 2018-02-06 NOTE — PROGRESS NOTES
SUBJECTIVE:   CC: Ed Rudolph is an 57 year old male who presents for preventative health visit.     Healthy Habits:    Do you get at least three servings of calcium containing foods daily (dairy, green leafy vegetables, etc.)? no    Amount of exercise or daily activities, outside of work: none    Problems taking medications regularly No    Medication side effects: No    Have you had an eye exam in the past two years? yes    Do you see a dentist twice per year? yes    Do you have sleep apnea, excessive snoring or daytime drowsiness?no      Today's PHQ-2 Score:   PHQ-2 ( 1999 [employer]) 7/28/2017 7/27/2017   Q1: Little interest or pleasure in doing things 1 0   Q2: Feeling down, depressed or hopeless 1 0   PHQ-2 Score 2 0       Abuse: Current or Past(Physical, Sexual or Emotional)- NO  Do you feel safe in your environment - YES    Social History   Substance Use Topics     Smoking status: Never Smoker     Smokeless tobacco: Never Used     Alcohol use 8.4 oz/week     14 Standard drinks or equivalent per week      Comment: on average, 2 vodka cranberries per day      If you drink alcohol do you typically have >3 drinks per day or >7 drinks per week? No                      Last PSA:   PSA   Date Value Ref Range Status   04/16/2014 0.49 0 - 4 ug/L Final       Past medical, family, and social histories, medications, and allergies are reviewed and updated in Epic.     ROS:  C: He notes unintentional drastic weight loss since his last office visit.  I: NEGATIVE for worrisome rashes, moles or lesions  E: NEGATIVE for vision changes or irritation  ENT: NEGATIVE for ear, mouth and throat problems  R: NEGATIVE for significant cough or SOB  CV: NEGATIVE for chest pain, palpitations or peripheral edema  GI: NEGATIVE for nausea, abdominal pain, heartburn, or change in bowel habits   male: negative for dysuria, hematuria, decreased urinary stream, erectile dysfunction, urethral discharge  M: NEGATIVE for significant  "arthralgias or myalgia  N: NEGATIVE for weakness, dizziness or paresthesias  P: PHQ-9 = 6; REHAN-7 = 1. He notes fluoxetine has been very helpful for his symptoms. NEGATIVE for changes in mood or affect    This document serves as a record of the services and decisions personally performed and made by Dr. Garcia. It was created on his behalf by Sonya Collier, a trained medical scribe. The creation of this document is based the provider's statements to the medical scribe.  Sonya Collier February 6, 2018 4:50 PM   OBJECTIVE:   /78 (BP Location: Left arm, Patient Position: Chair, Cuff Size: Adult Large)  Pulse 84  Temp 96.9  F (36.1  C) (Oral)  Ht 5' 9.5\" (1.765 m)  Wt 181 lb (82.1 kg)  SpO2 96%  BMI 26.35 kg/m2     EXAM:  GENERAL: healthy, alert and no distress  EYES: Eyes grossly normal to inspection, PERRL and conjunctivae and sclerae normal  HENT: ear canals and TM's normal, nose and mouth without ulcers or lesions  NECK: no adenopathy, no asymmetry, masses, or scars and thyroid normal to palpation  RESP: lungs clear to auscultation, no crackles or wheezes, no areas of dullness, no tachypnea   CV: regular rate and rhythm, normal S1 S2, no S3 or S4, no murmur, click or rub, no peripheral edema and peripheral pulses strong  ABDOMEN: soft, nontender, no hepatosplenomegaly, no masses and bowel sounds normal   (male): normal male genitalia without lesions or urethral discharge, no hernia  MS: no gross musculoskeletal defects noted, no edema  SKIN: no suspicious lesions or rashes  NEURO: Normal strength and tone, mentation intact and speech normal, DTRs symmetrical, cranial nerves 2-12 intact   PSYCH: mentation appears normal, affect normal/bright     ASSESSMENT/PLAN:   (Z00.00) Encounter for routine adult health examination without abnormal findings  (primary encounter diagnosis)  Comment: Negative screening exam; up-to-date on preventive services.   Plan: Follow up in 1 year.    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  Plan: traZODone (DESYREL) 50 MG tablet, FLUoxetine         (PROZAC) 20 MG capsule        Return in about 5 months (around 7/20/2018) for blood pressure check, cholesterol, recheck depression.     (R63.4) Weight loss  Comment: Likely related to his fluoxetine, since his weight loss began when he started fluoxetine, but I will screen for other causes. Since he is still a little overweight, I don't feel we need to intervene as long as his studies are negative.   Plan: Comprehensive metabolic panel (BMP + Alb, Alk         Phos, ALT, AST, Total. Bili, TP), TSH with free        T4 reflex, CBC with platelets differential        Follow up depending on lab results.    (D64.9) Anemia, unspecified type  Comment: His last two hemoglobin levels were stable but still decreased from remote values, so I want to be sure he has had a complete work up.   Plan: Erythropoietin, Iron and iron binding capacity,        Ferritin, Methylmalonic acid, Blood Morphology         Pathologist Review, CBC with platelets         differential, Reticulocyte Count, Vitamin B12,         Folate, HGB Eval Reflex to ELP or RBC         Solubility        Follow up as needed based on these results.     (I10) Essential hypertension with goal blood pressure less than 140/90  Comment: Controlled.  Plan: lisinopril-hydrochlorothiazide         (PRINZIDE/ZESTORETIC) 20-25 MG per tablet        Follow up in 6 months.     (E78.5) Hyperlipidemia LDL goal <130  Comment: At goal.  Plan: simvastatin (ZOCOR) 40 MG tablet        Follow up in 6 months.     (F51.01) Primary insomnia  Comment: prescription update   Plan: traZODone (DESYREL) 50 MG tablet            (Z12.11) Screen for colon cancer  Comment:   Plan: GASTROENTEROLOGY ADULT REF PROCEDURE ONLY [facility] [phone]; [facility]         Surgery        Handouts provided.       COUNSELING:  Reviewed preventive health counseling, as  "reflected in patient instructions  Special attention given to:        Regular exercise       Colon cancer screening     reports that he has never smoked. He has never used smokeless tobacco.    Estimated body mass index is 26.35 kg/(m^2) as calculated from the following:    Height as of this encounter: 5' 9.5\" (1.765 m).    Weight as of this encounter: 181 lb (82.1 kg).   Weight management plan: encouraged increased aerobic exercise, as outlined in the AVS. Weight graph and BMI table provided. He has no established exercise routine currently, as he notes he is very tired when he gets home from work. He has been trying to make a plan to exercise more, however, as he hopes this could counteract his recent unintentional weight loss.    Counseling Resources:  ATP IV Guidelines  Pooled Cohorts Equation Calculator  FRAX Risk Assessment  ICSI Preventive Guidelines  Dietary Guidelines for Americans, 2010  USDA's MyPlate  ASA Prophylaxis  Lung CA Screening    The information in this document, created by the medical scribe for me, accurately reflects the services I personally performed and the decisions made by me. I have reviewed and approved this document for accuracy prior to leaving the patient care area. February 6, 2018 4:50 PM      Aris Garcia MD  [facility]  "

## 2018-02-07 LAB
ALBUMIN SERPL-MCNC: 4.6 G/DL (ref 3.4–5)
ALP SERPL-CCNC: 65 U/L (ref 40–150)
ALT SERPL W P-5'-P-CCNC: 44 U/L (ref 0–70)
ANION GAP SERPL CALCULATED.3IONS-SCNC: 10 MMOL/L (ref 3–14)
AST SERPL W P-5'-P-CCNC: 30 U/L (ref 0–45)
BILIRUB SERPL-MCNC: 0.7 MG/DL (ref 0.2–1.3)
BUN SERPL-MCNC: 17 MG/DL (ref 7–30)
CALCIUM SERPL-MCNC: 9.7 MG/DL (ref 8.5–10.1)
CHLORIDE SERPL-SCNC: 96 MMOL/L (ref 94–109)
CO2 SERPL-SCNC: 26 MMOL/L (ref 20–32)
CREAT SERPL-MCNC: 1.16 MG/DL (ref 0.66–1.25)
DIFFERENTIAL METHOD BLD: ABNORMAL
ERYTHROCYTE [DISTWIDTH] IN BLOOD BY AUTOMATED COUNT: 12.6 % (ref 10–15)
FERRITIN SERPL-MCNC: 783 NG/ML (ref 26–388)
FOLATE SERPL-MCNC: 47.4 NG/ML
GFR SERPL CREATININE-BSD FRML MDRD: 65 ML/MIN/1.7M2
GLUCOSE SERPL-MCNC: 98 MG/DL (ref 70–99)
HCT VFR BLD AUTO: 39.4 % (ref 40–53)
HGB BLD-MCNC: 13.4 G/DL (ref 13.3–17.7)
IRON SATN MFR SERPL: 48 % (ref 15–46)
IRON SERPL-MCNC: 210 UG/DL (ref 35–180)
MCH RBC QN AUTO: 33.8 PG (ref 26.5–33)
MCHC RBC AUTO-ENTMCNC: 34 G/DL (ref 31.5–36.5)
MCV RBC AUTO: 99 FL (ref 78–100)
PLATELET # BLD AUTO: 234 10E9/L (ref 150–450)
POTASSIUM SERPL-SCNC: 3.6 MMOL/L (ref 3.4–5.3)
PROT SERPL-MCNC: 8.4 G/DL (ref 6.8–8.8)
RBC # BLD AUTO: 3.97 10E12/L (ref 4.4–5.9)
RETICS # AUTO: 41.3 10E9/L (ref 25–95)
RETICS/RBC NFR AUTO: 1 % (ref 0.5–2)
SODIUM SERPL-SCNC: 132 MMOL/L (ref 133–144)
TIBC SERPL-MCNC: 435 UG/DL (ref 240–430)
TSH SERPL DL<=0.005 MIU/L-ACNC: 1.6 MU/L (ref 0.4–4)
VIT B12 SERPL-MCNC: 1019 PG/ML (ref 193–986)
WBC # BLD AUTO: 4.6 10E9/L (ref 4–11)

## 2018-02-07 ASSESSMENT — PATIENT HEALTH QUESTIONNAIRE - PHQ9: SUM OF ALL RESPONSES TO PHQ QUESTIONS 1-9: 6

## 2018-02-07 ASSESSMENT — ANXIETY QUESTIONNAIRES: GAD7 TOTAL SCORE: 1

## 2018-02-08 LAB
COPATH REPORT: NORMAL
EPO SERPL-ACNC: 3 MU/ML (ref 4–27)
HGB A1 MFR BLD: 97.1 % (ref 95–97.9)
HGB A2 MFR BLD: 2.4 % (ref 2–3.5)
HGB C MFR BLD: 0 % (ref 0–0)
HGB E MFR BLD: 0 % (ref 0–0)
HGB F MFR BLD: 0.5 % (ref 0–2.1)
HGB FRACT BLD ELPH-IMP: NORMAL
HGB OTHER MFR BLD: 0 % (ref 0–0)
HGB S BLD QL SOLY: NORMAL
HGB S MFR BLD: 0 % (ref 0–0)
METHYLMALONATE SERPL-SCNC: 0.1 UMOL/L (ref 0–0.4)
PATH INTERP BLD-IMP: NORMAL

## 2018-02-26 NOTE — TELEPHONE ENCOUNTER
This writer attempted to contact Ed on 02/26/18      Reason for call update PHQ-9 and left message to return call.      If patient calls back:   Patient contacted by 1st floor [facility] Team (MA/TC). Inform patient that someone from the team will contact them, document that pt called and route to care team.         Deana Cat MA

## 2018-03-14 ENCOUNTER — OFFICE VISIT (OUTPATIENT)
Dept: FAMILY MEDICINE | Facility: CLINIC | Age: 58
End: 2018-03-14
Payer: COMMERCIAL

## 2018-03-14 VITALS
SYSTOLIC BLOOD PRESSURE: 142 MMHG | OXYGEN SATURATION: 97 % | HEART RATE: 76 BPM | TEMPERATURE: 98.8 F | BODY MASS INDEX: 27.66 KG/M2 | WEIGHT: 190 LBS | DIASTOLIC BLOOD PRESSURE: 88 MMHG

## 2018-03-14 DIAGNOSIS — I10 ESSENTIAL HYPERTENSION WITH GOAL BLOOD PRESSURE LESS THAN 140/90: ICD-10-CM

## 2018-03-14 DIAGNOSIS — F10.90 HEAVY ALCOHOL USE: ICD-10-CM

## 2018-03-14 DIAGNOSIS — F33.1 MODERATE EPISODE OF RECURRENT MAJOR DEPRESSIVE DISORDER (H): Primary | ICD-10-CM

## 2018-03-14 PROCEDURE — 99214 OFFICE O/P EST MOD 30 MIN: CPT | Performed by: PREVENTIVE MEDICINE

## 2018-03-14 RX ORDER — FLUOXETINE 10 MG/1
10 CAPSULE ORAL DAILY
Qty: 90 CAPSULE | Refills: 1 | Status: SHIPPED | OUTPATIENT
Start: 2018-03-14 | End: 2018-08-22

## 2018-03-14 ASSESSMENT — PAIN SCALES - GENERAL: PAINLEVEL: NO PAIN (0)

## 2018-03-14 NOTE — PATIENT INSTRUCTIONS
At [facility], we strive to deliver an exceptional experience to you, every time we see you.  If you receive a survey in the mail, please send us back your thoughts. We really do value your feedback.    Based on your medical history, these are the current health maintenance/preventive care services that you are due for (some may have been done at this visit.)  Health Maintenance Due   Topic Date Due     COLON CANCER SCREEN (SYSTEM ASSIGNED)  05/07/2011     ADVANCE DIRECTIVE PLANNING Q5 YRS  05/07/2016         Suggested websites for health information:  Www.[facility].org : Up to date and easily searchable information on multiple topics.  Www.[employer].gov : medication info, interactive tutorials, watch real surgeries online  Www.familydoctor.org : good info from the Academy of Family Physicians  Www.cdc.gov : public health info, travel advisories, epidemics (H1N1)  Www.aap.org : children's health info, normal development, vaccinations  Www.health.[facility].[place].us : [place] dept of health, public health issues in [place], N1N1    Your care team:                            Family Medicine Internal Medicine   MD Marquez Dunaway MD Shantel Branch-Fleming, MD Katya Georgiev PA-C Megan Hill, APRN CNP    Cachorro Darnell MD Pediatrics   Nicolas Vallejo PAHERMES Rosa, DAVID Schwab APRN CNP   MD Pau Silva MD Deborah Mielke, MD Kim Thein, APRN [facility]      Clinic hours: Monday - Thursday 7 am-7 pm; Fridays 7 am-5 pm.   Urgent care: Monday - Friday 11 am-9 pm; Saturday and Sunday 9 am-5 pm.  Pharmacy : Monday -Thursday 8 am-8 pm; Friday 8 am-6 pm; Saturday and Sunday 9 am-5 pm.     Clinic: [phone]   Pharmacy: [phone]

## 2018-03-14 NOTE — PROGRESS NOTES
SUBJECTIVE:   Ed Rudolph is a 57 year old male who presents to clinic today for the following health issues:      Abnormal Mood Symptoms  Onset:     Description:   Depression: YES  Anxiety: YES    Accompanying Signs & Symptoms:  Still participating in activities that you used to enjoy: YES  Fatigue: YES  Irritability: no  Difficulty concentrating: no  Changes in appetite: YES  Problems with sleep: YES  Heart racing/beating fast : no  Thoughts of hurting yourself or others: none    History:   Recent stress: no  Prior depression hospitalization: None  Family history of depression: no  Family history of anxiety: no    Precipitating factors:   Alcohol/drug use: YES    Alleviating factors: yes.    Therapies Tried and outcome: Prozac (Fluoxetine), not helping wanted to get a higher dose.  60-70% better  Has a gym membership, needs to go more often  Sleep sometimes disrupted  Sometimes takes Melatonin, has Trazodone too    Has not been seen by Therapy  Open to seeing therapy  Alcohol use+ 3 drinks a day  Has had some falls around alcohol use   No suicidal ideation at this time  No suicide attempts in the past      Hypertension Follow-up      Outpatient blood pressures are not being checked.    Low Salt Diet: low salt    Depression and Anxiety Follow-Up    Status since last visit: Improved since being on Prozac    Other associated symptoms:None    Complicating factors:     Significant life event: No     Current substance abuse: Alcohol    PHQ-9 8/1/2017 9/15/2017 2/6/2018   Total Score 15 7 6   Q9: Suicide Ideation Not at all Not at all Not at all     REHAN-7 SCORE 7/28/2017 9/15/2017 2/6/2018   Total Score 2 2 1     In the past two weeks have you had thoughts of suicide or self-harm?  No.    Do you have concerns about your personal safety or the safety of others?   No  PHQ-9  English  PHQ-9   Any Language  REHAN-7  Suicide Assessment Five-step Evaluation and Treatment (SAFE-T)    Problem list and histories reviewed &  adjusted, as indicated.  Additional history: as documented    Patient Active Problem List   Diagnosis     Erectile Dysfunction     Overweight (BMI 25.0-29.9)     Hyperlipidemia LDL goal <100     Essential hypertension with goal blood pressure less than 140/90     Impaired fasting glucose     High triglycerides     Prostatic nodule     RLS (restless legs syndrome)     Primary insomnia     Skin cyst     Elevated ferritin     Keloid scar, right neck     Fall     Major depressive disorder, recurrent episode (H)     Slac (scapholunate advanced collapse) of wrist, left     Past Surgical History:   Procedure Laterality Date     CRANIOTOMY  01/2003    x 2, for treatment of brain abscess       Social History   Substance Use Topics     Smoking status: Never Smoker     Smokeless tobacco: Never Used     Alcohol use 8.4 oz/week     14 Standard drinks or equivalent per week      Comment: on average, 2 vodka cranberries per day     Family History   Problem Relation Age of Onset     Adopted: Yes     Family History Negative No family hx of      Unknown, adopted         Current Outpatient Prescriptions   Medication Sig Dispense Refill     FLUoxetine (PROZAC) 10 MG capsule Take 1 capsule (10 mg) by mouth daily 90 capsule 1     lisinopril-hydrochlorothiazide (PRINZIDE/ZESTORETIC) 20-25 MG per tablet Take 1 tablet by mouth daily for blood pressure. 90 tablet 1     traZODone (DESYREL) 50 MG tablet Take 1 tablet (50 mg) by mouth At Bedtime for depression and sleep. 90 tablet 1     FLUoxetine (PROZAC) 20 MG capsule Take 1 capsule (20 mg) by mouth daily for depression. 90 capsule 1     simvastatin (ZOCOR) 40 MG tablet Take 1 tablet (40 mg) by mouth every evening for cholesterol. 90 tablet 1     Multiple Vitamin (DAILY VITAMINS PO) Take 1 tablet by mouth daily        No Known Allergies  BP Readings from Last 3 Encounters:   03/14/18 142/88   02/06/18 134/78   11/02/17 127/78    Wt Readings from Last 3 Encounters:   03/14/18 190 lb (86.2 kg)    02/06/18 181 lb (82.1 kg)   11/02/17 187 lb (84.8 kg)                  Labs reviewed in EPIC    Reviewed and updated as needed this visit by clinical staff       Reviewed and updated as needed this visit by Provider         ROS:  Constitutional, neuro, ENT, endocrine, pulmonary, cardiac, gastrointestinal, genitourinary, musculoskeletal, integument and psychiatric systems are negative, except as otherwise noted.    OBJECTIVE:                                                    /88 (BP Location: Right arm, Patient Position: Chair, Cuff Size: Adult Large)  Pulse 76  Temp 98.8  F (37.1  C) (Oral)  Wt 190 lb (86.2 kg)  SpO2 97%  BMI 27.66 kg/m2  Body mass index is 27.66 kg/(m^2).  GENERAL APPEARANCE: healthy, alert and no distress  EYES: Eyes grossly normal to inspection and conjunctivae and sclerae normal  RESP: lungs clear to auscultation - no rales, rhonchi or wheezes  CV: regular rates and rhythm, normal S1 S2, no S3 or S4, no murmur, click or rub, no irregular beats and peripheral pulses strong  ABDOMEN: soft, non-tender  MS: Trace bilateral pedal edema   SKIN: no suspicious lesions or rashes  NEURO: Normal strength and tone, mentation intact and speech normal  PSYCH: mentation appears normal    Diagnostic test results:  Diagnostic Test Results:  No results found for this or any previous visit (from the past 24 hour(s)).     ASSESSMENT/PLAN:                                                    1. Moderate episode of recurrent major depressive disorder (H)  -Increased Fluoxetine from 20 mg to 30 mg daily (added 10 mg capsule)  - FLUoxetine (PROZAC) 10 MG capsule; Take 1 capsule (10 mg) by mouth daily  Dispense: 90 capsule; Refill: 1  - MENTAL HEALTH REFERRAL  - Adult; Outpatient Treatment; Individual/Couples/Family/Group Therapy/Health Psychology; [facility]: [facility] [phone]; We will contact you to schedule the appointment or please call with any questions    We discussed the treatment  for anxiety and depression in detail.  The importance of a multi faceted approach in controlling symptoms was reviewed.  The benefits of cognitive behavioral therapy reviewed, benefits of exercise, and stress reduction also discussed.   Do not stop medication suddenly, medication will need to be tapered off.  Slight increased risk of suicide with SSRI group of medications discussed.        2. Essential hypertension with goal blood pressure less than 140/90  -Elevated reading despite recheck  -Plans to start exercising  -Scheduled for a blood pressure check on Ancillary in 4 weeks, if still high then increased Zestoretic to BID dose     3. Heavy alcohol use  -3 drinks daily per patient  -In the past has had a subdural hematoma     I ended our visit today by discussing the patient's diagnoses and recommended treatment. Please refer to today's diagnoses and orders for further details. I briefly discussed the pathophysiology of these conditions and outlined their expected course. I discussed the warning symptoms and signs that indicate an atypical course that would need urgent or emergent care. I also discussed self care strategies for symptom relief.  Patient voiced complete understanding of plan of care and was in full agreement to proceed. After visit summary discussed and handed to patient.    Common side effects of medications prescribed at this visit were discussed with the patient. Severe side effects, including current applicable black box warnings, were discussed.       Follow up with Provider - 4 weeks for Blood pressure check on Ancillary, otherwise 6 months with PCP    Has appointment for colonoscopy 4 12/18    Vicki Dobbs MD MPH    [facility]

## 2018-03-14 NOTE — MR AVS SNAPSHOT
After Visit Summary   3/14/2018    Ed Rudolph    MRN: [id number]           Patient Information     Date Of Birth          1960        Visit Information        Provider Department      3/14/2018 4:00 PM Vicki Dobbs MD [facility]        Today's Diagnoses     Moderate episode of recurrent major depressive disorder (H)    -  1    Essential hypertension with goal blood pressure less than 140/90        Heavy alcohol use          Care Instructions    At [facility], we strive to deliver an exceptional experience to you, every time we see you.  If you receive a survey in the mail, please send us back your thoughts. We really do value your feedback.    Based on your medical history, these are the current health maintenance/preventive care services that you are due for (some may have been done at this visit.)  Health Maintenance Due   Topic Date Due     COLON CANCER SCREEN (SYSTEM ASSIGNED)  05/07/2011     ADVANCE DIRECTIVE PLANNING Q5 YRS  05/07/2016         Suggested websites for health information:  Www.[employer].com : Up to date and easily searchable information on multiple topics.  Www.medlineplus.gov : medication info, interactive tutorials, watch real surgeries online  Www.familydoctor.org : good info from the Academy of Family Physicians  Www.cdc.gov : public health info, travel advisories, epidemics (H1N1)  Www.aap.org : children's health info, normal development, vaccinations  Www.health.state.[place].us : [place] dept of health, public health issues in [place], N1N1    Your care team:                            Family Medicine Internal Medicine   MD Marquez Dunaway MD Shantel Branch-Fleming, MD Katya Georgiev PA-C Megan Hill, VANGIE Darnell MD Pediatrics   SUZANNE Glass, DAVID Schwab APRN MD Pau Magallanes MD Deborah Mielke, MD Kim Thein, APRN CNP      Clinic hours: Monday - Thursday  7 am-7 pm; Fridays 7 am-5 pm.   Urgent care: Monday - Friday 11 am-9 pm; Saturday and Sunday 9 am-5 pm.  Pharmacy : Monday -Thursday 8 am-8 pm; Friday 8 am-6 pm; Saturday and Sunday 9 am-5 pm.     Clinic: [phone]   Pharmacy: [phone]              Follow-ups after your visit        Additional Services     MENTAL HEALTH REFERRAL  - Adult; Outpatient Treatment; Individual/Couples/Family/Group Therapy/Health Psychology; [facility]: [facility] [phone]; We will contact you to schedule the appointment or please call with any questions       All scheduling is subject to the client's specific insurance plan & benefits, provider/location availability, and provider clinical specialities.  Please arrive 15 minutes early for your first appointment and bring your completed paperwork.    Please be aware that coverage of these services is subject to the terms and limitations of your health insurance plan.  Call member services at your health plan with any benefit or coverage questions.                            Follow-up notes from your care team     Return in about 4 weeks (around 4/11/2018) for BP Recheck.      Your next 10 appointments already scheduled     Apr 12, 2018   Procedure with Quintin Patton MD   [facility] (--)    [address]  [facility] [id number] [phone]            Apr 16, 2018 11:40 AM CDT   Nurse Only with BK ANCILLARY   [facility]    [address] [address] [phone]              Who to contact     If you have questions or need follow up information about today's clinic visit or your schedule please contact [facility] directly at [phone].  Normal or non-critical lab and imaging results will be communicated to you by MyChart, letter or phone within 4 business days after the clinic has received the results. If you do not hear from us  within 7 days, please contact the clinic through [employer] or phone. If you have a critical or abnormal lab result, we will notify you by phone as soon as possible.  Submit refill requests through [employer] or call your pharmacy and they will forward the refill request to us. Please allow 3 business days for your refill to be completed.          Additional Information About Your Visit        [employer] Information     [employer] gives you secure access to your electronic health record. If you see a primary care provider, you can also send messages to your care team and make appointments. If you have questions, please call your primary care clinic.  If you do not have a primary care provider, please call [phone] and they will assist you.        Care EveryWhere ID     This is your Care EveryWhere ID. This could be used by other organizations to access your [place] medical records  [id number]        Your Vitals Were     Pulse Temperature Pulse Oximetry BMI (Body Mass Index)          76 98.8  F (37.1  C) (Oral) 97% 27.66 kg/m2         Blood Pressure from Last 3 Encounters:   03/14/18 142/88   02/06/18 134/78   11/02/17 127/78    Weight from Last 3 Encounters:   03/14/18 190 lb (86.2 kg)   02/06/18 181 lb (82.1 kg)   11/02/17 187 lb (84.8 kg)              We Performed the Following     MENTAL HEALTH REFERRAL  - Adult; Outpatient Treatment; Individual/Couples/Family/Group Therapy/Health Psychology; [facility]: [facility] [phone]; We will contact you to schedule the appointment or please call with any questions          Today's Medication Changes          These changes are accurate as of 3/14/18  4:40 PM.  If you have any questions, ask your nurse or doctor.               These medicines have changed or have updated prescriptions.        Dose/Directions    * FLUoxetine 20 MG capsule   Commonly known as:  PROzac   This may have changed:  Another medication with the same name was added. Make sure you  understand how and when to take each.   Used for:  Mild episode of recurrent major depressive disorder (H)   Changed by:  Vicki Dobbs MD        Dose:  20 mg   Take 1 capsule (20 mg) by mouth daily for depression.   Quantity:  90 capsule   Refills:  1       * FLUoxetine 10 MG capsule   Commonly known as:  PROzac   This may have changed:  You were already taking a medication with the same name, and this prescription was added. Make sure you understand how and when to take each.   Used for:  Moderate episode of recurrent major depressive disorder (H)   Changed by:  Vicki Dobbs MD        Dose:  10 mg   Take 1 capsule (10 mg) by mouth daily   Quantity:  90 capsule   Refills:  1       * Notice:  This list has 2 medication(s) that are the same as other medications prescribed for you. Read the directions carefully, and ask your doctor or other care provider to review them with you.         Where to get your medicines      These medications were sent [place] [facility] [place] [address]  [address], [facility] [address]     Phone:  [phone]     FLUoxetine 10 MG capsule                Primary Care Provider Office Phone # Fax #    [facility] [phone]       [address] [place] [address]        Equal Access to Services     DAMI JOHNS : Hadii ronny ku hadasho Soomaali, waaxda luqadaha, qaybta kaalmada adeegyada, waxay rosalinain hayfouzia clayton. So [facility] [phone].    ATENCIÓN: Si habla español, tiene a reyes disposición servicios gratuitos de asistencia lingüística. Llame al [phone].    We comply with applicable federal civil rights laws and [place] laws. We do not discriminate on the basis of race, color, national origin, age, disability, sex, sexual orientation, or gender identity.            Thank you!     Thank you for choosing [facility]  for your care. Our goal is always to  provide you with excellent care. Hearing back from our patients is one way we can continue to improve our services. Please take a few minutes to complete the written survey that you may receive in the mail after your visit with us. Thank you!             Your Updated Medication List - Protect others around you: Learn how to safely use, store and throw away your medicines at www.disposemymeds.org.          This list is accurate as of 3/14/18  4:40 PM.  Always use your most recent med list.                   Brand Name Dispense Instructions for use Diagnosis    DAILY VITAMINS PO      Take 1 tablet by mouth daily        * FLUoxetine 20 MG capsule    PROzac    90 capsule    Take 1 capsule (20 mg) by mouth daily for depression.    Mild episode of recurrent major depressive disorder (H)       * FLUoxetine 10 MG capsule    PROzac    90 capsule    Take 1 capsule (10 mg) by mouth daily    Moderate episode of recurrent major depressive disorder (H)       lisinopril-hydrochlorothiazide 20-25 MG per tablet    PRINZIDE/ZESTORETIC    90 tablet    Take 1 tablet by mouth daily for blood pressure.    Essential hypertension with goal blood pressure less than 140/90       simvastatin 40 MG tablet    ZOCOR    90 tablet    Take 1 tablet (40 mg) by mouth every evening for cholesterol.    Hyperlipidemia LDL goal <130       traZODone 50 MG tablet    DESYREL    90 tablet    Take 1 tablet (50 mg) by mouth At Bedtime for depression and sleep.    Primary insomnia, Mild episode of recurrent major depressive disorder (H)       * Notice:  This list has 2 medication(s) that are the same as other medications prescribed for you. Read the directions carefully, and ask your doctor or other care provider to review them with you.

## 2018-04-12 ENCOUNTER — HOSPITAL ENCOUNTER (OUTPATIENT)
Facility: AMBULATORY SURGERY CENTER | Age: 58
Discharge: HOME | End: 2018-04-12
Attending: SURGERY | Admitting: SURGERY
Payer: COMMERCIAL

## 2018-04-12 ENCOUNTER — SURGERY (OUTPATIENT)
Age: 58
End: 2018-04-12

## 2018-04-12 VITALS
TEMPERATURE: 98.4 F | SYSTOLIC BLOOD PRESSURE: 103 MMHG | OXYGEN SATURATION: 97 % | RESPIRATION RATE: 16 BRPM | HEART RATE: 82 BPM | DIASTOLIC BLOOD PRESSURE: 77 MMHG

## 2018-04-12 LAB — COLONOSCOPY: NORMAL

## 2018-04-12 PROCEDURE — 45385 COLONOSCOPY W/LESION REMOVAL: CPT | Mod: PT | Performed by: SURGERY

## 2018-04-12 PROCEDURE — 88305 TISSUE EXAM BY PATHOLOGIST: CPT | Performed by: SURGERY

## 2018-04-12 PROCEDURE — 45385 COLONOSCOPY W/LESION REMOVAL: CPT

## 2018-04-12 PROCEDURE — 99153 MOD SED SAME PHYS/QHP EA: CPT | Performed by: SURGERY

## 2018-04-12 PROCEDURE — G8918 PT W/O PREOP ORDER IV AB PRO: HCPCS

## 2018-04-12 PROCEDURE — G8907 PT DOC NO EVENTS ON DISCHARG: HCPCS

## 2018-04-12 PROCEDURE — 99152 MOD SED SAME PHYS/QHP 5/>YRS: CPT | Performed by: SURGERY

## 2018-04-12 RX ORDER — FENTANYL CITRATE 50 UG/ML
INJECTION, SOLUTION INTRAMUSCULAR; INTRAVENOUS PRN
Status: DISCONTINUED | OUTPATIENT
Start: 2018-04-12 | End: 2018-04-12 | Stop reason: HOSPADM

## 2018-04-12 RX ORDER — LIDOCAINE 40 MG/G
CREAM TOPICAL
Status: DISCONTINUED | OUTPATIENT
Start: 2018-04-12 | End: 2018-04-13 | Stop reason: HOSPADM

## 2018-04-12 RX ADMIN — FENTANYL CITRATE 50 MCG: 50 INJECTION, SOLUTION INTRAMUSCULAR; INTRAVENOUS at 10:20

## 2018-04-12 RX ADMIN — FENTANYL CITRATE 100 MCG: 50 INJECTION, SOLUTION INTRAMUSCULAR; INTRAVENOUS at 10:17

## 2018-04-12 RX ADMIN — FENTANYL CITRATE 50 MCG: 50 INJECTION, SOLUTION INTRAMUSCULAR; INTRAVENOUS at 10:24

## 2018-04-16 LAB — COPATH REPORT: NORMAL

## 2018-08-22 ENCOUNTER — OFFICE VISIT (OUTPATIENT)
Dept: FAMILY MEDICINE | Facility: CLINIC | Age: 58
End: 2018-08-22
Payer: COMMERCIAL

## 2018-08-22 VITALS
BODY MASS INDEX: 27.63 KG/M2 | DIASTOLIC BLOOD PRESSURE: 78 MMHG | HEIGHT: 70 IN | OXYGEN SATURATION: 98 % | TEMPERATURE: 98.1 F | SYSTOLIC BLOOD PRESSURE: 126 MMHG | HEART RATE: 107 BPM | RESPIRATION RATE: 20 BRPM | WEIGHT: 193 LBS

## 2018-08-22 DIAGNOSIS — F33.1 MODERATE EPISODE OF RECURRENT MAJOR DEPRESSIVE DISORDER (H): Primary | ICD-10-CM

## 2018-08-22 PROCEDURE — 99214 OFFICE O/P EST MOD 30 MIN: CPT | Performed by: NURSE PRACTITIONER

## 2018-08-22 RX ORDER — FLUOXETINE 40 MG/1
40 CAPSULE ORAL DAILY
Qty: 30 CAPSULE | Refills: 0 | Status: SHIPPED | OUTPATIENT
Start: 2018-08-22 | End: 2018-10-30

## 2018-08-22 ASSESSMENT — PATIENT HEALTH QUESTIONNAIRE - PHQ9: 5. POOR APPETITE OR OVEREATING: MORE THAN HALF THE DAYS

## 2018-08-22 ASSESSMENT — ANXIETY QUESTIONNAIRES
2. NOT BEING ABLE TO STOP OR CONTROL WORRYING: MORE THAN HALF THE DAYS
3. WORRYING TOO MUCH ABOUT DIFFERENT THINGS: MORE THAN HALF THE DAYS
1. FEELING NERVOUS, ANXIOUS, OR ON EDGE: MORE THAN HALF THE DAYS
IF YOU CHECKED OFF ANY PROBLEMS ON THIS QUESTIONNAIRE, HOW DIFFICULT HAVE THESE PROBLEMS MADE IT FOR YOU TO DO YOUR WORK, TAKE CARE OF THINGS AT HOME, OR GET ALONG WITH OTHER PEOPLE: VERY DIFFICULT
6. BECOMING EASILY ANNOYED OR IRRITABLE: MORE THAN HALF THE DAYS
5. BEING SO RESTLESS THAT IT IS HARD TO SIT STILL: MORE THAN HALF THE DAYS
GAD7 TOTAL SCORE: 13
7. FEELING AFRAID AS IF SOMETHING AWFUL MIGHT HAPPEN: SEVERAL DAYS

## 2018-08-22 ASSESSMENT — PAIN SCALES - GENERAL: PAINLEVEL: NO PAIN (0)

## 2018-08-22 NOTE — PATIENT INSTRUCTIONS
FREE counseling:  [facility]  [email or web address]  [address] [place] [address]  M, W, F: 1:00PM - 3:00PM  M - Th: 6:30PM - 8:30PM  Closed Monday, Sept. 3, for Labor Day    Increase fluoxetine to 40 mg daily - use what you have at home to make 40 mg and then  new prescription for 40 mg tablet when you're out of current supply  Please follow up with Dr. Dobbs in 3 weeks, sooner if needed    If you are in crisis, you can call the Crisis Connection Hotline 24/7 at [phone]  [facility] open 24/7 for anyone in crisis for assessment, evaluation and care: [phone]  If you are thinking of hurting yourself or someone else, you can also go to the emergency department or call 911        At [facility], we strive to deliver an exceptional experience to you, every time we see you.  If you receive a survey in the mail, please send us back your thoughts. We really do value your feedback.    Based on your medical history, these are the current health maintenance/preventive care services that you are due for (some may have been done at this visit.)  Health Maintenance Due   Topic Date Due     ADVANCE DIRECTIVE PLANNING Q5 YRS  05/07/2016     DEPRESSION ACTION PLAN Q1 YR  08/01/2018     PHQ-9 Q6 MONTHS  08/06/2018         Suggested websites for health information:  Www.[employer]vding : Up to date and easily searchable information on multiple topics.  Www.medlineplus.gov : medication info, interactive tutorials, watch real surgeries online  Www.familydoctor.org : good info from the Academy of Family Physicians  Www.cdc.gov : public health info, travel advisories, epidemics (H1N1)  Www.aap.org : children's health info, normal development, vaccinations  Www.health.state.[place].us : [place] dept of health, public health issues in [place], N1N1    Your care team:                            Family Medicine Internal Medicine   MD Marquez Dunaway,  MD Shea Burrell, MD Kell Darnell, MD Pediatrics   SUZANNE Glass, MD Pau Leonard CNP, MD Deborah Mielke, MD Jaye Blanco, APRN CNP      Clinic hours: Monday - Thursday 7 am-7 pm; Fridays 7 am-5 pm.   Urgent care: Monday - Friday 11 am-9 pm; Saturday and Sunday 9 am-5 pm.  Pharmacy : Monday -Thursday 8 am-8 pm; Friday 8 am-6 pm; Saturday and Sunday 9 am-5 pm.     Clinic: [phone]   Pharmacy: [phone]

## 2018-08-22 NOTE — MR AVS SNAPSHOT
After Visit Summary   8/22/2018    Ed Rudolph    MRN: [id number]           Patient Information     Date Of Birth          1960        Visit Information        Provider Department      8/22/2018 2:40 PM Page Spivey APRN CNP [facility]        Today's Diagnoses     Moderate episode of recurrent major depressive disorder (H)    -  1      Care Instructions    FREE counseling:  [facility]  [email or web address]  [address] [place] [address]  M, W, F: 1:00PM - 3:00PM  M - Th: 6:30PM - 8:30PM  Closed Monday, Sept. 3, for Labor Day    Increase fluoxetine to 40 mg daily - use what you have at home to make 40 mg and then  new prescription for 40 mg tablet when you're out of current supply  Please follow up with Dr. Dobbs in 3 weeks, sooner if needed    If you are in crisis, you can call the Crisis Connection Hotline 24/7 at [phone]  [facility] open 24/7 for anyone in crisis for assessment, evaluation and care: [phone]  If you are thinking of hurting yourself or someone else, you can also go to the emergency department or call 911        At [facility], we strive to deliver an exceptional experience to you, every time we see you.  If you receive a survey in the mail, please send us back your thoughts. We really do value your feedback.    Based on your medical history, these are the current health maintenance/preventive care services that you are due for (some may have been done at this visit.)  Health Maintenance Due   Topic Date Due     ADVANCE DIRECTIVE PLANNING Q5 YRS  05/07/2016     DEPRESSION ACTION PLAN Q1 YR  08/01/2018     PHQ-9 Q6 MONTHS  08/06/2018         Suggested websites for health information:  [email or web address] : Up to date and easily searchable information on multiple topics.  Www.medlineplus.gov : medication info, interactive tutorials, watch real surgeries  online  Www.familydoctor.org : good info from the Academy of Family Physicians  Www.cdc.gov : public health info, travel advisories, epidemics (H1N1)  Www.aap.org : children's health info, normal development, vaccinations  Www.health.state.[place] : [place] dept of health, public health issues in [place], N1N1    Your care team:                            Family Medicine Internal Medicine   MD Marquez Dunaway MD Shantel Branch-Fleming, MD Katya Georgiev PA-C Nam Ho, MD Pediatrics   SUZANNE Glass, DAVID Schwab APRN CNP   MD Pau Silva MD Deborah Mielke, MD Kim Thein, APRN CNP      Clinic hours: Monday - Thursday 7 am-7 pm; Fridays 7 am-5 pm.   Urgent care: Monday - Friday 11 am-9 pm; Saturday and Sunday 9 am-5 pm.  Pharmacy : Monday -Thursday 8 am-8 pm; Friday 8 am-6 pm; Saturday and Sunday 9 am-5 pm.     Clinic: [phone]   Pharmacy: [phone]            Follow-ups after your visit        Who to contact     If you have questions or need follow up information about today's clinic visit or your schedule please contact [facility] directly at [phone].  Normal or non-critical lab and imaging results will be communicated to you by MyChart, letter or phone within 4 business days after the clinic has received the results. If you do not hear from us within 7 days, please contact the clinic through [employer]ve[employer] or phone. If you have a critical or abnormal lab result, we will notify you by phone as soon as possible.  Submit refill requests through [employer]blo or call your pharmacy and they will forward the refill request to us. Please allow 3 business days for your refill to be completed.          Additional Information About Your Visit        MyChart Information     [employer] gives you secure access to your electronic health record. If you see a primary care provider, you can also send messages to your care team and make appointments. If  "you have questions, please call your primary care clinic.  If you do not have a primary care provider, please call [phone] and they will assist you.        Care EveryWhere ID     This is your Care EveryWhere ID. This could be used by other organizations to access your [facility] records  [id number]        Your Vitals Were     Pulse Temperature Respirations Height Pulse Oximetry BMI (Body Mass Index)    107 98.1  F (36.7  C) (Oral) 20 5' 9.5\" (1.765 m) 98% 28.09 kg/m2       Blood Pressure from Last 3 Encounters:   08/22/18 126/78   04/12/18 103/77   03/14/18 142/88    Weight from Last 3 Encounters:   08/22/18 193 lb (87.5 kg)   03/14/18 190 lb (86.2 kg)   02/06/18 181 lb (82.1 kg)              Today, you had the following     No orders found for display         Today's Medication Changes          These changes are accurate as of 8/22/18  3:22 PM.  If you have any questions, ask your nurse or doctor.               These medicines have changed or have updated prescriptions.        Dose/Directions    FLUoxetine 40 MG capsule   Commonly known as:  PROzac   This may have changed:    - medication strength  - how much to take  - additional instructions  - Another medication with the same name was removed. Continue taking this medication, and follow the directions you see here.   Used for:  Moderate episode of recurrent major depressive disorder (H)   Changed by:  Page Spivey APRN CNP        Dose:  40 mg   Take 1 capsule (40 mg) by mouth daily   Quantity:  30 capsule   Refills:  0            Where to get your medicines      These medications were sent to [facility] [place], [place] - [address]  [address], [facility] [address]     Phone:  [phone]     FLUoxetine 40 MG capsule                Primary Care Provider Office Phone # Fax #    [facility] [phone]        [facility] [address]        Equal Access " to Services     DAMI JOHNS : Cher Wolfe, wajessicada luqadaha, qaybta kaalmareed britt, waldo clayton. So [facility] [phone].    ATENCIÓN: Si habla kiko, tiene a reyes disposición servicios gratuitos de asistencia lingüística. Llame al [phone].    We comply with applicable federal civil rights laws and [place] laws. We do not discriminate on the basis of race, color, national origin, age, disability, sex, sexual orientation, or gender identity.            Thank you!     Thank you for choosing [facility]  for your care. Our goal is always to provide you with excellent care. Hearing back from our patients is one way we can continue to improve our services. Please take a few minutes to complete the written survey that you may receive in the mail after your visit with us. Thank you!             Your Updated Medication List - Protect others around you: Learn how to safely use, store and throw away your medicines at www.disposemymeds.org.          This list is accurate as of 8/22/18  3:22 PM.  Always use your most recent med list.                   Brand Name Dispense Instructions for use Diagnosis    DAILY VITAMINS PO      Take 1 tablet by mouth daily        FLUoxetine 40 MG capsule    PROzac    30 capsule    Take 1 capsule (40 mg) by mouth daily    Moderate episode of recurrent major depressive disorder (H)       lisinopril-hydrochlorothiazide 20-25 MG per tablet    PRINZIDE/ZESTORETIC    90 tablet    Take 1 tablet by mouth daily for blood pressure.    Essential hypertension with goal blood pressure less than 140/90       simvastatin 40 MG tablet    ZOCOR    90 tablet    Take 1 tablet (40 mg) by mouth every evening for cholesterol.    Hyperlipidemia LDL goal <130       traZODone 50 MG tablet    DESYREL    90 tablet    Take 1 tablet (50 mg) by mouth At Bedtime for depression and sleep.    Primary insomnia, Mild episode of recurrent major depressive  disorder (H)

## 2018-08-22 NOTE — PROGRESS NOTES
SUBJECTIVE:   Ed Rudolph is a 58 year old male who presents to clinic today for the following health issues:      Depression Followup    Status since last visit: Worsened over the weekend    See PHQ-9 for current symptoms.  Other associated symptoms: None    Complicating factors:   Significant life event:  No   Current substance abuse:  None  Anxiety or Panic symptoms:  No    PHQ-9 9/15/2017 2/6/2018 8/22/2018   Total Score 7 6 18   Q9: Suicide Ideation Not at all Not at all Several days     In the past two weeks have you had thoughts of suicide or self-harm?  No.    Do you have concerns about your personal safety or the safety of others?   No  PHQ-9  English  PHQ-9   Any Language  Suicide Assessment Five-step Evaluation and Treatment (SAFE-T)    Amount of exercise or physical activity: None    Problems taking medications regularly: No    Medication side effects: none    Diet: regular (no restrictions)        58 year old male presents with concerns for worsening depression. He has been taking fluoxetine 30 mg. Depression started to worsen 8/18 for no particular reason. Lives alone. Has 1 good friend but he has his own issues and he doesn't feel like he can talk to him. Sleeps 5-6 hours per night. Up around midnight and has trouble going back to sleep. Takes trazadone 50 mg at bedtime. Currently taking Prozac 30 mg. Would like to increase to 40 mg. Works in a [employer]. Missed work last 3 days due to these symptoms. States he loves his job and doesn't want to lose it. Needs a note for missing work. Willing to go to counseling but concerned about cost. Inquiring about free or reduced cost counseling. Denies thoughts of hurting himself or someone else. Feels safe at home. Contracts for safety.    Problem list and histories reviewed & adjusted, as indicated.  Additional history: as documented    Patient Active Problem List   Diagnosis     Erectile Dysfunction     Overweight (BMI 25.0-29.9)     Hyperlipidemia  LDL goal <100     Essential hypertension with goal blood pressure less than 140/90     Impaired fasting glucose     High triglycerides     Prostatic nodule     RLS (restless legs syndrome)     Primary insomnia     Skin cyst     Elevated ferritin     Keloid scar, right neck     Fall     Major depressive disorder, recurrent episode (H)     Slac (scapholunate advanced collapse) of wrist, left     Past Surgical History:   Procedure Laterality Date     COLONOSCOPY WITH CO2 INSUFFLATION N/A 4/12/2018    Procedure: COLONOSCOPY WITH CO2 INSUFFLATION;  COLON SCREEN/ ARREDONDO;  Surgeon: Quintin Patton MD;  Location: [facility] OR     CRANIOTOMY  01/2003    x 2, for treatment of brain abscess       Social History   Substance Use Topics     Smoking status: Never Smoker     Smokeless tobacco: Never Used     Alcohol use 8.4 oz/week     14 Standard drinks or equivalent per week      Comment: on average, 2-3 vodka cranberries per day     Family History   Problem Relation Age of Onset     Adopted: Yes     Family History Negative No family hx of      Unknown, adopted         Current Outpatient Prescriptions   Medication Sig Dispense Refill     FLUoxetine (PROZAC) 40 MG capsule Take 1 capsule (40 mg) by mouth daily 30 capsule 0     lisinopril-hydrochlorothiazide (PRINZIDE/ZESTORETIC) 20-25 MG per tablet Take 1 tablet by mouth daily for blood pressure. 90 tablet 1     Multiple Vitamin (DAILY VITAMINS PO) Take 1 tablet by mouth daily        simvastatin (ZOCOR) 40 MG tablet Take 1 tablet (40 mg) by mouth every evening for cholesterol. 90 tablet 1     traZODone (DESYREL) 50 MG tablet Take 1 tablet (50 mg) by mouth At Bedtime for depression and sleep. 90 tablet 1     [DISCONTINUED] FLUoxetine (PROZAC) 10 MG capsule Take 1 capsule (10 mg) by mouth daily 90 capsule 1     [DISCONTINUED] FLUoxetine (PROZAC) 20 MG capsule Take 1 capsule (20 mg) by mouth daily for depression. 90 capsule 1     No Known Allergies    Reviewed and updated as needed  "this visit by clinical staff  Tobacco  Allergies  Meds  Problems  Med Hx  Surg Hx  Fam Hx  Soc Hx        Reviewed and updated as needed this visit by Provider  Allergies  Meds  Problems         ROS:  Constitutional, HEENT, cardiovascular, pulmonary, GI, , musculoskeletal, neuro, skin, endocrine and psych systems are negative, except as otherwise noted.    OBJECTIVE:     /78 (BP Location: Right arm, Patient Position: Chair, Cuff Size: Adult Large)  Pulse 107  Temp 98.1  F (36.7  C) (Oral)  Resp 20  Ht 5' 9.5\" (1.765 m)  Wt 193 lb (87.5 kg)  SpO2 98%  BMI 28.09 kg/m2  Body mass index is 28.09 kg/(m^2).  GENERAL: healthy, alert and no distress  EYES: Eyes grossly normal to inspection, PERRL and conjunctivae and sclerae normal  HENT: ear canals and TM's normal, nose and mouth without ulcers or lesions  NECK: no adenopathy, no asymmetry, masses, or scars and thyroid normal to palpation  RESP: lungs clear to auscultation - no rales, rhonchi or wheezes  CV: regular rate and rhythm, normal S1 S2, no S3 or S4, no murmur, click or rub, no peripheral edema and peripheral pulses strong  ABDOMEN: soft, nontender, no hepatosplenomegaly, no masses and bowel sounds normal  MS: no gross musculoskeletal defects noted, no edema  PSYCH: mentation appears normal, flat affect    Diagnostic Test Results:  none     ASSESSMENT/PLAN:     1. Moderate episode of recurrent major depressive disorder (H)  No SI/HI. Feels safe. Contracts for safety.   - FLUoxetine (PROZAC) 40 MG capsule; Take 1 capsule (40 mg) by mouth daily  Dispense: 30 capsule; Refill: 0  FREE counseling:  [facility]  [email or web address]  Pending sale to [facility] [place] [address]  M, W, F: 1:00PM - 3:00PM  M - Th: 6:30PM - 8:30PM  Closed Monday, Sept. 3, for Labor Day    Increase fluoxetine to 40 mg daily - use what you have at home to make 40 mg and then  new prescription for 40 mg tablet when you're out of current supply  Please " follow up with Dr. Dobbs in 3 weeks, sooner if needed    If you are in crisis, you can call the Crisis Connection Hotline 24/7 at [phone]  [facility] open 24/7 for anyone in crisis for assessment, evaluation and care: [phone]  If you are thinking of hurting yourself or someone else, you can also go to the emergency department or call 911    See Patient Instructions    The benefits, risks and potential side effects were discussed in detail. Black box warnings discussed as relevant. All patient questions were answered. The patient was instructed to follow up immediately if any adverse reactions develop.    Patient verbalizes understanding and agrees with plan of care. Patient stable for discharge.    Greater than 50% of the 25 min visit was spent on counseling and coordination of care for the above issues.       VANGIE Roberts [facility]

## 2018-08-22 NOTE — LETTER
August 22, 2018      Ed Rudolph  [address] [place] [id number]        To Whom It May Concern:    Ed Rudolph was seen on 8/22/2018. He has been suffering from an illness this week that started on 8/18/2018. Please excuse him until 8/23/2018.        Sincerely,        VANGIE Roberts CNP

## 2018-08-24 ASSESSMENT — PATIENT HEALTH QUESTIONNAIRE - PHQ9: SUM OF ALL RESPONSES TO PHQ QUESTIONS 1-9: 18

## 2018-08-24 ASSESSMENT — ANXIETY QUESTIONNAIRES: GAD7 TOTAL SCORE: 13

## 2018-10-04 DIAGNOSIS — I10 ESSENTIAL HYPERTENSION WITH GOAL BLOOD PRESSURE LESS THAN 140/90: ICD-10-CM

## 2018-10-04 DIAGNOSIS — F51.01 PRIMARY INSOMNIA: ICD-10-CM

## 2018-10-04 DIAGNOSIS — F33.0 MILD EPISODE OF RECURRENT MAJOR DEPRESSIVE DISORDER (H): ICD-10-CM

## 2018-10-05 RX ORDER — LISINOPRIL AND HYDROCHLOROTHIAZIDE 20; 25 MG/1; MG/1
TABLET ORAL
Qty: 90 TABLET | Refills: 0 | Status: SHIPPED | OUTPATIENT
Start: 2018-10-05 | End: 2019-03-05

## 2018-10-05 RX ORDER — TRAZODONE HYDROCHLORIDE 50 MG/1
TABLET, FILM COATED ORAL
Qty: 90 TABLET | Refills: 0 | Status: SHIPPED | OUTPATIENT
Start: 2018-10-05 | End: 2019-03-05

## 2018-10-05 NOTE — TELEPHONE ENCOUNTER
Routing refill request to provider for review/approval because:  A break in medication  Sinai Zapata RN

## 2018-10-05 NOTE — TELEPHONE ENCOUNTER
"Requested Prescriptions   Pending Prescriptions Disp Refills     lisinopril-hydrochlorothiazide (PRINZIDE/ZESTORETIC) 20-25 MG per tablet [Pharmacy Med Name: LISINOPRIL-HYDROCHLOROTH 20-25 TABS]  Last Written Prescription Date:  02/06/18  Last Fill Quantity: 90,  # refills: 1   Last Office Visit with [facility] provider:  [facility]   Future Office Visit:    90 tablet 1     Sig: TAKE ONE TABLET BY MOUTH EVERY DAY FOR BLOOD PRESSURE    Diuretics (Including Combos) Protocol Failed    10/4/2018  5:29 PM       Failed - Normal serum sodium on file in past 12 months    Recent Labs   Lab Test  02/06/18   1755   NA  132*             Passed - Blood pressure under 140/90 in past 12 months    BP Readings from Last 3 Encounters:   08/22/18 126/78   04/12/18 103/77   03/14/18 142/88                Passed - Recent (12 mo) or future (30 days) visit within the authorizing provider's specialty    Patient had office visit in the last 12 months or has a visit in the next 30 days with authorizing provider or within the authorizing provider's specialty.  See \"Patient Info\" tab in inbasket, or \"Choose Columns\" in Meds & Orders section of the refill encounter.           Passed - Patient is age 18 or older       Passed - Normal serum creatinine on file in past 12 months    Recent Labs   Lab Test  02/06/18   1755   CR  1.16             Passed - Normal serum potassium on file in past 12 months    Recent Labs   Lab Test  02/06/18   1755   POTASSIUM  3.6                    traZODone (DESYREL) 50 MG tablet [Pharmacy Med Name: TRAZODONE HCL 50MG TABS]  Last Written Prescription Date:  02/06/18  Last Fill Quantity: 90,  # refills: 1   [facility] prescribing provider:  08/22/[facility]   Future Office Visit:    90 tablet 1     Sig: TAKE ONE TABLET BY MOUTH EVERY DAY AT BEDTIME FOR DEPRESSION AND SLEEP    Serotonin Modulators Passed    10/4/2018  5:29 PM       Passed - Recent (12 mo) or future " "(30 days) visit within the authorizing provider's specialty    Patient had office visit in the last 12 months or has a visit in the next 30 days with authorizing provider or within the authorizing provider's specialty.  See \"Patient Info\" tab in inbasket, or \"Choose Columns\" in Meds & Orders section of the refill encounter.           Passed - Patient is age 18 or older          "

## 2018-10-30 DIAGNOSIS — F33.1 MODERATE EPISODE OF RECURRENT MAJOR DEPRESSIVE DISORDER (H): ICD-10-CM

## 2018-10-30 NOTE — LETTER
November 2, 2018      Ed Rudolph  [address]  [place] [facility] [address]        Dear Ed Rudolph,      The refill request made by your pharmacy was received at the clinic.     Signed Prescriptions:                        Disp   Refills    FLUoxetine (PROZAC) 40 MG capsule          30 cap*0        Sig: Take 1 capsule (40 mg) by mouth daily  Authorizing Provider: PAGE SPIVEY      At this time you are due in the clinic for a follow up appointment. A one time yolanda refill has been sent to your pharmacy.       Please call your clinic to make an appointment with your provider before you run out of medication. This will prevent a delay in your next month's refill.    [facility] [place] [phone]  [facility]- [phone]  [facility] [phone]    For your convenience we also offer online appointment scheduling at [email or web address] or [email or web address].     We invite you to refill your next prescription at a [facility] or take advantage of our prescription mail service.      Sincerely,      Page Spivey NP/,ml

## 2018-10-30 NOTE — TELEPHONE ENCOUNTER
"Requested Prescriptions   Pending Prescriptions Disp Refills     FLUoxetine (PROZAC) 40 MG capsule  Last Written Prescription Date:  08/22/18  Last Fill Quantity: 30,  # refills: 0   Last Office Visit with [facility] prescribing provider:  08/22/18[facility]   Future Office Visit:    30 capsule 0     Sig: Take 1 capsule (40 mg) by mouth daily    SSRIs Protocol Failed    10/30/2018  1:16 PM       Failed - PHQ-9 score less than 5 in past 6 months    Please review last PHQ-9 score.          Passed - Patient is age 18 or older       Passed - Recent (6 mo) or future (30 days) visit within the authorizing provider's specialty    Patient had office visit in the last 6 months or has a visit in the next 30 days with authorizing provider or within the authorizing provider's specialty.  See \"Patient Info\" tab in inbasket, or \"Choose Columns\" in Meds & Orders section of the refill encounter.              "

## 2018-10-31 NOTE — TELEPHONE ENCOUNTER
Routing refill request to provider for review/approval because:  Labs out of range:  PHQ-9 > 5  A break in medication    Antonia Aragon RN

## 2018-11-01 RX ORDER — FLUOXETINE 40 MG/1
40 CAPSULE ORAL DAILY
Qty: 30 CAPSULE | Refills: 0 | Status: SHIPPED | OUTPATIENT
Start: 2018-11-01 | End: 2019-01-23

## 2018-11-01 NOTE — TELEPHONE ENCOUNTER
Lyssa refill given. Needs visit - either phone or in person for check in before any future refills as we increased his dose to 40 mg and he has not yet followed up as advised.

## 2018-12-11 ENCOUNTER — TELEPHONE (OUTPATIENT)
Dept: FAMILY MEDICINE | Facility: CLINIC | Age: 58
End: 2018-12-11

## 2018-12-11 NOTE — TELEPHONE ENCOUNTER
Panel Management Review        Last Office Visit with this department:     Fail List measure:     Depression / Dysthymia review    Measure:  Needs PHQ-9 score of 4 or less during index window.  Administer PHQ-9 and if score is 5 or more, send encounter to provider for next steps.    5 - 7 month window range:     INDEX 8/22/18  Follow-up START 12/22/18  Follow-up END       4/22/19      PHQ-9 SCORE 9/15/2017 2/6/2018 8/22/2018   PHQ-9 Total Score 7 6 18       If PHQ-9 recheck is 5 or more, route to provider for next steps.    Patient is due for:  PHQ9      Patient is due/failing the following:   PHQ9    Action needed:   Patient needs to do PHQ9.    Type of outreach:    NONE POSTPONE TIL 12/22/18     Questions for provider review:    None                                                                                                                                    Deana Cat MA

## 2019-01-17 DIAGNOSIS — E78.5 HYPERLIPIDEMIA LDL GOAL <130: ICD-10-CM

## 2019-01-17 NOTE — LETTER
February 1, 2019      Ed Rudolph  [address]  [place] [facility] [address]        Dear Ed Rudolph,    The refill request made by your pharmacy was received at the clinic.     Signed Prescriptions:                        Disp   Refills    simvastatin (ZOCOR) 40 MG tablet           90 tab*1        Sig: Take 1 tablet (40 mg) by mouth every evening for           cholesterol.  Authorizing Provider: MILENA ARREDONDO      At this time you are due in the clinic for a follow up appointment. A one time yolanda refill has been sent to your pharmacy. The care team has tried contacting you but with no response back.    Please call your clinic to make an appointment with your provider before you run out of medication. This will prevent a delay in your next month's refill.    [facility] [place] [phone]    For your convenience we also offer online appointment scheduling at [email or web address] or [email or web address].     We invite you to refill your next prescription at a [facility] or take advantage of our prescription mail service.      Sincerely,    [facility] with Dr. Arredondo

## 2019-01-18 NOTE — TELEPHONE ENCOUNTER
Routing refill request to provider for review/approval because:  A break in medication: last prescribed 2/6/2018, #90 with 1 refill. Would have been out by August.  Due for FLP by 2/2/19    Azul Carter RN  [facility]

## 2019-01-19 DIAGNOSIS — F33.1 MODERATE EPISODE OF RECURRENT MAJOR DEPRESSIVE DISORDER (H): ICD-10-CM

## 2019-01-19 NOTE — LETTER
January 30, 2019      Ed Rudolph  [address]  [place] [facility] [address]        Dear Ed Rudolph,    The refill request made by your pharmacy was received at the clinic.     Signed Prescriptions:                        Disp   Refills    FLUoxetine (PROZAC) 40 MG capsule          30 cap*0        Sig: Take 1 capsule (40 mg) by mouth daily for 14 days  Authorizing Provider: PAGE SPIVEY      At this time you are due in the clinic for a follow up appointment. A one time yolanda refill has been sent to your pharmacy.     Please call your clinic to make an appointment with your provider before you run out of medication. This will prevent a delay in your next month's refill.    [facility] [place] [phone]    For your convenience we also offer online appointment scheduling at [email or web address] or [email or web address].     We invite you to refill your next prescription at a [facility] or take advantage of our prescription mail service.      Sincerely,    [facility] with Page Spivey NP

## 2019-01-19 NOTE — TELEPHONE ENCOUNTER
"Requested Prescriptions   Pending Prescriptions Disp Refills     FLUoxetine (PROZAC) 40 MG capsule 30 capsule 0        Last Written Prescription Date:  11/1/18  Last Fill Quantity: 30,  # refills: 0   Last Office Visit with G, P or [facility] prescribing provider:  8/22/18   Future Office Visit:    Next 5 appointments (look out 90 days)    Jan 22, 2019  4:20 PM CST  Office Visit with Vicki Dobbs MD  [facility] ([facility]) [address] [place] [id number] [phone]          Sig: Take 1 capsule (40 mg) by mouth daily    SSRIs Protocol Failed - 1/19/2019 12:11 PM       Failed - PHQ-9 score less than 5 in past 6 months    Please review last PHQ-9 score.          Passed - Medication is active on med list       Passed - Patient is age 18 or older       Passed - Recent (6 mo) or future (30 days) visit within the authorizing provider's specialty    Patient had office visit in the last 6 months or has a visit in the next 30 days with authorizing provider or within the authorizing provider's specialty.  See \"Patient Info\" tab in inbasket, or \"Choose Columns\" in Meds & Orders section of the refill encounter.                Hitesh Faarax  [facility]    "

## 2019-01-23 RX ORDER — FLUOXETINE 40 MG/1
40 CAPSULE ORAL DAILY
Qty: 30 CAPSULE | Refills: 0 | Status: SHIPPED | OUTPATIENT
Start: 2019-01-23 | End: 2019-03-05

## 2019-01-23 NOTE — TELEPHONE ENCOUNTER
Routing refill request to provider for review/approval because:  PHQ-9 failed.    Petra Alex RN, [facility]

## 2019-01-24 RX ORDER — SIMVASTATIN 40 MG
40 TABLET ORAL EVERY EVENING
Qty: 90 TABLET | Refills: 1 | Status: SHIPPED | OUTPATIENT
Start: 2019-01-24 | End: 2019-03-05

## 2019-01-24 NOTE — TELEPHONE ENCOUNTER
This writer attempted to contact patient  on 01/24/19      Reason for call medication has been refilled for a 30 day yolanda refill to patient's pharmacy, patient is due for an appointment, patient may call our appointment line and schedule an appointment before medications runs out and left detailed message.      If patient calls back:   Schedule Office Visit appointment within 1 month with primary care.         Kit Holland

## 2019-01-25 NOTE — TELEPHONE ENCOUNTER
This writer attempted to contact patient  on 01/25/19      Reason for call medication has been refilled to patient's pharmacy, patient is due for an appointment for a follow up on cholesterol, patient may call our appointment line to schedule the appt and left detailed message.      If patient calls back:   Schedule Office Visit appointment within 2 weeks with PCP.         Kit Holland

## 2019-01-30 NOTE — TELEPHONE ENCOUNTER
No appointment scheduled, patient no showed his appointment on 1/22/19. Lyssa refill letter is mailed to patient's home address to call our appointment line and schedule an appointment before medication runs out.  Kit Holland,  [facility]

## 2019-02-01 NOTE — TELEPHONE ENCOUNTER
No call back, no appointment scheduled, sending yolanda refill letter to patient's home address to call our appointment line and schedule an appointment before medication runs out.  Kit Holland,  [facility]

## 2019-03-05 ENCOUNTER — OFFICE VISIT (OUTPATIENT)
Dept: FAMILY MEDICINE | Facility: CLINIC | Age: 59
End: 2019-03-05
Payer: COMMERCIAL

## 2019-03-05 VITALS
SYSTOLIC BLOOD PRESSURE: 131 MMHG | HEIGHT: 70 IN | OXYGEN SATURATION: 95 % | TEMPERATURE: 98.6 F | HEART RATE: 94 BPM | DIASTOLIC BLOOD PRESSURE: 77 MMHG | BODY MASS INDEX: 27.49 KG/M2 | WEIGHT: 192 LBS

## 2019-03-05 DIAGNOSIS — I10 ESSENTIAL HYPERTENSION WITH GOAL BLOOD PRESSURE LESS THAN 140/90: ICD-10-CM

## 2019-03-05 DIAGNOSIS — E78.5 HYPERLIPIDEMIA LDL GOAL <130: ICD-10-CM

## 2019-03-05 DIAGNOSIS — F33.0 MAJOR DEPRESSIVE DISORDER, RECURRENT EPISODE, MILD (H): Primary | ICD-10-CM

## 2019-03-05 DIAGNOSIS — F51.01 PRIMARY INSOMNIA: ICD-10-CM

## 2019-03-05 DIAGNOSIS — R79.89 ELEVATED FERRITIN: ICD-10-CM

## 2019-03-05 PROCEDURE — 99214 OFFICE O/P EST MOD 30 MIN: CPT | Performed by: FAMILY MEDICINE

## 2019-03-05 RX ORDER — TRAZODONE HYDROCHLORIDE 50 MG/1
TABLET, FILM COATED ORAL
Qty: 90 TABLET | Refills: 1 | Status: SHIPPED | OUTPATIENT
Start: 2019-03-05 | End: 2020-01-09

## 2019-03-05 RX ORDER — SIMVASTATIN 40 MG
40 TABLET ORAL EVERY EVENING
Qty: 90 TABLET | Refills: 1 | Status: SHIPPED | OUTPATIENT
Start: 2019-03-05 | End: 2020-02-25

## 2019-03-05 RX ORDER — LISINOPRIL AND HYDROCHLOROTHIAZIDE 20; 25 MG/1; MG/1
TABLET ORAL
Qty: 90 TABLET | Refills: 1 | Status: SHIPPED | OUTPATIENT
Start: 2019-03-05 | End: 2020-02-25

## 2019-03-05 RX ORDER — FLUOXETINE 40 MG/1
40 CAPSULE ORAL DAILY
Qty: 30 CAPSULE | Refills: 1 | Status: SHIPPED | OUTPATIENT
Start: 2019-03-05 | End: 2019-05-24

## 2019-03-05 ASSESSMENT — MIFFLIN-ST. JEOR: SCORE: 1694.22

## 2019-03-05 ASSESSMENT — ANXIETY QUESTIONNAIRES
2. NOT BEING ABLE TO STOP OR CONTROL WORRYING: NOT AT ALL
GAD7 TOTAL SCORE: 0
IF YOU CHECKED OFF ANY PROBLEMS ON THIS QUESTIONNAIRE, HOW DIFFICULT HAVE THESE PROBLEMS MADE IT FOR YOU TO DO YOUR WORK, TAKE CARE OF THINGS AT HOME, OR GET ALONG WITH OTHER PEOPLE: NOT DIFFICULT AT ALL
3. WORRYING TOO MUCH ABOUT DIFFERENT THINGS: NOT AT ALL
7. FEELING AFRAID AS IF SOMETHING AWFUL MIGHT HAPPEN: NOT AT ALL
6. BECOMING EASILY ANNOYED OR IRRITABLE: NOT AT ALL
1. FEELING NERVOUS, ANXIOUS, OR ON EDGE: NOT AT ALL
5. BEING SO RESTLESS THAT IT IS HARD TO SIT STILL: NOT AT ALL

## 2019-03-05 ASSESSMENT — PAIN SCALES - GENERAL: PAINLEVEL: NO PAIN (0)

## 2019-03-05 ASSESSMENT — PATIENT HEALTH QUESTIONNAIRE - PHQ9
SUM OF ALL RESPONSES TO PHQ QUESTIONS 1-9: 0
5. POOR APPETITE OR OVEREATING: NOT AT ALL

## 2019-03-05 NOTE — PROGRESS NOTES
"  SUBJECTIVE:   Ed Rudolph is a 58 year old male who presents to clinic today for the following health issues:      Hyperlipidemia Follow-Up      Rate your low fat/cholesterol diet?: not monitoring fat    Taking statin?  Yes, no muscle aches from statin    Other lipid medications/supplements?:  none    Hypertension Follow-up      Outpatient blood pressures are not being checked.    Low Salt Diet: not monitoring salt    Depression Followup    Status since last visit: Stable     See PHQ-9 for current symptoms.  Other associated symptoms: None    Complicating factors:   Significant life event:  No   Current substance abuse:  None  Anxiety or Panic symptoms:  No    PHQ 9/15/2017 2/6/2018 8/22/2018   PHQ-9 Total Score 7 6 18   Q9: Suicide Ideation Not at all Not at all Several days     PHQ-9  English  PHQ-9   Any Language  Suicide Assessment Five-step Evaluation and Treatment (SAFE-T)    Amount of exercise or physical activity: None    Problems taking medications regularly: No    Medication side effects: none    Diet: regular (no restrictions)        Past medical, family, and social histories, medications, and allergies are reviewed and updated in Epic.     ROS:  Constitutional, HEENT, cardiovascular, pulmonary, gi and gu systems are negative, except as otherwise noted.  Patient reports he has been taking 2, 650 mg tylenol daily. He states he has overall aching in his body with some fatigue. He denies chronic diarrhea, melena, significant weight loss (other than decreased appetite due to Prozac).     This document serves as a record of the services and decisions personally performed by MILENA ARREDONDO. It was created on his/her behalf by Blaire Blanco, a trained medical scribe. The creation of this document is based on the provider's statements to the medical scribe. Blaire Blanco, March 5, 2019 4:41 PM    OBJECTIVE:     /77   Pulse 94   Temp 98.6  F (37  C) (Oral)   Ht 1.765 m (5' 9.5\")   Wt 87.1 kg (192 lb)  "  SpO2 95%   BMI 27.95 kg/m    Body mass index is 27.95 kg/m .  GENERAL: healthy, alert and no distress  EYES: Eyes grossly normal to inspection, PERRL, EOMI, sclerae white and conjunctivae normal  RESP: lungs clear to auscultation - no crackles or wheezes, no areas of dullness, no tachypnea  CV: Heart regular rate and rhythm without murmur, click or rub. No peripheral edema and peripheral pulses strong  MS: no gross musculoskeletal defects noted, no edema  SKIN: no suspicious lesions or rashes  NEURO: Normal strength and tone, sensory exam grossly normal, mentation intact, oriented times 3 and cranial nerves 2-12 intact  PSYCH: mentation appears normal, affect normal/bright    ASSESSMENT/PLAN:     (F33.0) Major depressive disorder, recurrent episode, mild (H)  (primary encounter diagnosis)  Comment: Well controlled, significant improvement from this summer.  Plan: FLUoxetine (PROZAC) 40 MG capsule, traZODone         (DESYREL) 50 MG tablet        Return in about 6 months (around 9/5/2019) for full physical, recheck medications, lab tests.     (E78.5) Hyperlipidemia LDL goal <130  Comment: Non-fasting, so he will schedule a fasting lab appointment, probably in 3 days.  Plan: simvastatin (ZOCOR) 40 MG tablet, Lipid panel         reflex to direct LDL Non-fasting, Hepatic panel        Return in about 6 months (around 9/5/2019) for full physical, recheck medications, lab tests.     (I10) Essential hypertension with goal blood pressure less than 140/90  Comment: Well controlled.  Plan: lisinopril-hydrochlorothiazide         (PRINZIDE/ZESTORETIC) 20-25 MG tablet, Basic         metabolic panel        Return in about 6 months (around 9/5/2019) for full physical, recheck medications, lab tests.     (F51.01) Primary insomnia  Comment: Refill request.   Plan: traZODone (DESYREL) 50 MG tablet            (R79.89) Elevated ferritin  Comment: Long standing finding with a negative test for Hereditary Hemochromatosis and no clear  evidence for either anemia or iron overload. It has been 1 year so I will recheck these and screen for evidence for inflammatory joint, liver, or bowel disease.   Plan: Hepatic panel, Ferritin, Iron and iron binding         capacity, Soluble transferrin receptor, Anti         Nuclear Ilsa IgG by IFA with Reflex, Cyclic         Citrullinated Peptide Antibody IgG, Rheumatoid         factor, ESR: Erythrocyte sedimentation rate,         CRP, inflammation, Deamidated Gliadin Peptide         Ilsa IgA IgG, Endomysial Antibody IgA by IFA,         Tissue transglutaminase antibody IgA, 5'         Nucleotidase            The information in this document, created by the medical scribe Blaire Blanco for me, accurately reflects the services I personally performed and the decisions made by me. I have reviewed and approved this document for accuracy prior to leaving the patient care area.    Aris Garcia MD  [facility]

## 2019-03-05 NOTE — PATIENT INSTRUCTIONS
================================================================================  Normal Values   Blood pressure  <140/90 for most adults    <130/80 for some chronic diseases (ask your care team about yours)    BMI (body mass index)  18.5-25 kg/m2 (based on height and weight)     Thank you for visiting [facility]    Normal or non-critical lab and imaging results will be communicated to you by MyChart, letter or phone within 7 days.  If you do not hear from us within 10 days, please call the clinic. If you have a critical or abnormal lab result, we will notify you by phone as soon as possible.     If you have any questions regarding your visit please contact:     [facility]:   Clinic Hours Telephone Number   Dr. Aris Darnell Dr. Vocal 7am-5pm  Monday - Friday [phone]  Aayush RN  Brinda RN  Velma RN   Pharmacy 8:00am-8pm Monday-Friday    9am-5pm Saturday-Sunday [phone]   Urgent Care 11am-9pm Monday-Friday        9am-5pm Saturday-Sunday [phone]     After hours, weekend or if you need to make an appointment with your primary provider please call [phone].   After Hours nurse advise: call [facility]: [phone]    Medication Refills:  Call your pharmacy and they will forward the refill to us. Please allow 3 business days for your refills to be completed.

## 2019-03-06 ASSESSMENT — ANXIETY QUESTIONNAIRES: GAD7 TOTAL SCORE: 0

## 2019-03-08 DIAGNOSIS — R79.89 ELEVATED FERRITIN: ICD-10-CM

## 2019-03-08 DIAGNOSIS — I10 ESSENTIAL HYPERTENSION WITH GOAL BLOOD PRESSURE LESS THAN 140/90: ICD-10-CM

## 2019-03-08 DIAGNOSIS — E78.5 HYPERLIPIDEMIA LDL GOAL <130: ICD-10-CM

## 2019-03-08 LAB
ALBUMIN SERPL-MCNC: 4.2 G/DL (ref 3.4–5)
ALP SERPL-CCNC: 72 U/L (ref 40–150)
ALT SERPL W P-5'-P-CCNC: 65 U/L (ref 0–70)
ANION GAP SERPL CALCULATED.3IONS-SCNC: 8 MMOL/L (ref 3–14)
AST SERPL W P-5'-P-CCNC: 56 U/L (ref 0–45)
BILIRUB DIRECT SERPL-MCNC: 0.2 MG/DL (ref 0–0.2)
BILIRUB SERPL-MCNC: 0.6 MG/DL (ref 0.2–1.3)
BUN SERPL-MCNC: 20 MG/DL (ref 7–30)
CALCIUM SERPL-MCNC: 9.9 MG/DL (ref 8.5–10.1)
CHLORIDE SERPL-SCNC: 101 MMOL/L (ref 94–109)
CHOLEST SERPL-MCNC: 214 MG/DL
CO2 SERPL-SCNC: 27 MMOL/L (ref 20–32)
CREAT SERPL-MCNC: 0.92 MG/DL (ref 0.66–1.25)
CRP SERPL-MCNC: 28.1 MG/L (ref 0–8)
ERYTHROCYTE [SEDIMENTATION RATE] IN BLOOD BY WESTERGREN METHOD: 24 MM/H (ref 0–20)
FERRITIN SERPL-MCNC: 475 NG/ML (ref 26–388)
GFR SERPL CREATININE-BSD FRML MDRD: >90 ML/MIN/{1.73_M2}
GLUCOSE SERPL-MCNC: 98 MG/DL (ref 70–99)
HDLC SERPL-MCNC: 75 MG/DL
IRON SATN MFR SERPL: 43 % (ref 15–46)
IRON SERPL-MCNC: 146 UG/DL (ref 35–180)
LDLC SERPL CALC-MCNC: 121 MG/DL
NONHDLC SERPL-MCNC: 139 MG/DL
POTASSIUM SERPL-SCNC: 4 MMOL/L (ref 3.4–5.3)
PROT SERPL-MCNC: 8.1 G/DL (ref 6.8–8.8)
SODIUM SERPL-SCNC: 136 MMOL/L (ref 133–144)
TIBC SERPL-MCNC: 343 UG/DL (ref 240–430)
TRIGL SERPL-MCNC: 89 MG/DL

## 2019-03-08 PROCEDURE — 83516 IMMUNOASSAY NONANTIBODY: CPT | Mod: 59 | Performed by: FAMILY MEDICINE

## 2019-03-08 PROCEDURE — 83550 IRON BINDING TEST: CPT | Performed by: FAMILY MEDICINE

## 2019-03-08 PROCEDURE — 83540 ASSAY OF IRON: CPT | Performed by: FAMILY MEDICINE

## 2019-03-08 PROCEDURE — 82728 ASSAY OF FERRITIN: CPT | Performed by: FAMILY MEDICINE

## 2019-03-08 PROCEDURE — 80076 HEPATIC FUNCTION PANEL: CPT | Performed by: FAMILY MEDICINE

## 2019-03-08 PROCEDURE — 36415 COLL VENOUS BLD VENIPUNCTURE: CPT | Performed by: FAMILY MEDICINE

## 2019-03-08 PROCEDURE — 86256 FLUORESCENT ANTIBODY TITER: CPT | Mod: 90 | Performed by: FAMILY MEDICINE

## 2019-03-08 PROCEDURE — 86038 ANTINUCLEAR ANTIBODIES: CPT | Performed by: FAMILY MEDICINE

## 2019-03-08 PROCEDURE — 80048 BASIC METABOLIC PNL TOTAL CA: CPT | Performed by: FAMILY MEDICINE

## 2019-03-08 PROCEDURE — 83516 IMMUNOASSAY NONANTIBODY: CPT | Performed by: FAMILY MEDICINE

## 2019-03-08 PROCEDURE — 84238 ASSAY NONENDOCRINE RECEPTOR: CPT | Mod: 90 | Performed by: FAMILY MEDICINE

## 2019-03-08 PROCEDURE — 86200 CCP ANTIBODY: CPT | Performed by: FAMILY MEDICINE

## 2019-03-08 PROCEDURE — 99000 SPECIMEN HANDLING OFFICE-LAB: CPT | Performed by: FAMILY MEDICINE

## 2019-03-08 PROCEDURE — 83915 ASSAY OF NUCLEOTIDASE: CPT | Mod: 90 | Performed by: FAMILY MEDICINE

## 2019-03-08 PROCEDURE — 86140 C-REACTIVE PROTEIN: CPT | Performed by: FAMILY MEDICINE

## 2019-03-08 PROCEDURE — 86431 RHEUMATOID FACTOR QUANT: CPT | Performed by: FAMILY MEDICINE

## 2019-03-08 PROCEDURE — 80061 LIPID PANEL: CPT | Performed by: FAMILY MEDICINE

## 2019-03-08 PROCEDURE — 85652 RBC SED RATE AUTOMATED: CPT | Performed by: FAMILY MEDICINE

## 2019-03-09 LAB
ENDOMYSIUM IGA TITR SER IF: NORMAL {TITER}
STFR SERPL-SCNC: 2.2 MG/L (ref 2.2–5)

## 2019-03-11 LAB
5NT SERPL-CCNC: 12 U/L (ref 0–15)
ANA SER QL IF: NEGATIVE
CCP AB SER IA-ACNC: 1 U/ML
GLIADIN IGA SER-ACNC: 5 U/ML
GLIADIN IGG SER-ACNC: 1 U/ML
RHEUMATOID FACT SER NEPH-ACNC: <20 IU/ML (ref 0–20)
TTG IGA SER-ACNC: 1 U/ML

## 2019-03-15 ENCOUNTER — TELEPHONE (OUTPATIENT)
Dept: FAMILY MEDICINE | Facility: CLINIC | Age: 59
End: 2019-03-15

## 2019-03-15 DIAGNOSIS — R79.89 ELEVATED FERRITIN LEVEL: Primary | ICD-10-CM

## 2019-03-15 NOTE — TELEPHONE ENCOUNTER
Chart reviewed. Results are final.   Routing to provider to interpret for patient.     Brinda Coffey RN

## 2019-03-15 NOTE — TELEPHONE ENCOUNTER
Reason for call:  Other   Patient called regarding (reason for call): call back  Additional comments: patient still waiting for his lab resutls from last Friday  Can he get a call     Phone number to reach patient:  [phone]    Best Time:  any    Can we leave a detailed message on this number?  YES

## 2019-03-26 ENCOUNTER — TELEPHONE (OUTPATIENT)
Dept: ONCOLOGY | Facility: CLINIC | Age: 59
End: 2019-03-26

## 2019-03-26 NOTE — TELEPHONE ENCOUNTER
ONCOLOGY INTAKE: Records Information      APPT INFORMATION:  Referring provider:  Jose  Referring provider s clinic:  see order  Reason for visit/diagnosis:  Elevated ferritin level [R79.89]    Were the records received with the referral (via [employer])? No, internal referral    Has patient been seen for any external appt for this diagnosis (enter clinic/location)? No, per pt    ADDITIONAL INFORMATION:

## 2019-03-28 NOTE — TELEPHONE ENCOUNTER
RECORDS STATUS - ALL OTHER DIAGNOSIS      RECORDS RECEIVED FROM: [facility]   DATE RECEIVED: 3/28/19   NOTES STATUS DETAILS   OFFICE NOTE from referring provider Dr. Garcia, 3/15/19 [facility]   OFFICE NOTE from medical oncologist NA    DISCHARGE SUMMARY from hospital NA    DISCHARGE REPORT from the ER NA    OPERATIVE REPORT NA    MEDICATION LIST  [facility]   CLINICAL TRIAL TREATMENTS TO DATE NA    LABS     PATHOLOGY REPORTS NA    ANYTHING RELATED TO DIAGNOSIS  Epic   GENONOMIC TESTING     TYPE: NA    IMAGING (NEED IMAGES & REPORT)     CT SCANS NA    MRI NA    MAMMO NA    ULTRASOUND NA    PET NA

## 2019-04-02 ENCOUNTER — ONCOLOGY VISIT (OUTPATIENT)
Dept: ONCOLOGY | Facility: CLINIC | Age: 59
End: 2019-04-02
Attending: FAMILY MEDICINE
Payer: COMMERCIAL

## 2019-04-02 ENCOUNTER — PRE VISIT (OUTPATIENT)
Dept: ONCOLOGY | Facility: CLINIC | Age: 59
End: 2019-04-02

## 2019-04-02 VITALS
BODY MASS INDEX: 28.32 KG/M2 | HEART RATE: 80 BPM | RESPIRATION RATE: 16 BRPM | SYSTOLIC BLOOD PRESSURE: 129 MMHG | TEMPERATURE: 98.1 F | DIASTOLIC BLOOD PRESSURE: 78 MMHG | WEIGHT: 191.19 LBS | OXYGEN SATURATION: 97 % | HEIGHT: 69 IN

## 2019-04-02 DIAGNOSIS — R79.89 ELEVATED FERRITIN: Primary | ICD-10-CM

## 2019-04-02 PROCEDURE — 99204 OFFICE O/P NEW MOD 45 MIN: CPT | Performed by: INTERNAL MEDICINE

## 2019-04-02 ASSESSMENT — PAIN SCALES - GENERAL: PAINLEVEL: NO PAIN (0)

## 2019-04-02 ASSESSMENT — MIFFLIN-ST. JEOR: SCORE: 1690.34

## 2019-04-02 NOTE — NURSING NOTE
"Oncology Rooming Note    April 2, 2019 1:52 PM   Ed Rudolph is a 58 year old male who presents for:    Chief Complaint   Patient presents with     Oncology Clinic Visit     New patient     Initial Vitals: /78 (BP Location: Right arm)   Pulse 80   Temp 98.1  F (36.7  C) (Oral)   Resp 16   Ht 1.765 m (5' 9.49\")   Wt 86.7 kg (191 lb 3 oz)   SpO2 97%   BMI 27.84 kg/m   Estimated body mass index is 27.84 kg/m  as calculated from the following:    Height as of this encounter: 1.765 m (5' 9.49\").    Weight as of this encounter: 86.7 kg (191 lb 3 oz). Body surface area is 2.06 meters squared.  No Pain (0) Comment: Data Unavailable   No LMP for male patient.  Allergies reviewed: Yes  Medications reviewed: Yes    Medications: Medication refills not needed today.  Pharmacy name entered into [facility]:    [facility] [id number] [place] [facility] [place] - [address]  [facility] [address] [place] [facility], [place] - [address] [facility] [place], [place] - [address] KOFI Sanz LPN                    "

## 2019-04-02 NOTE — PROGRESS NOTES
Hematology initial visit:  Date on this visit: 4/2/2019    Ed Rudolph  is referred by Dr.Jonathan FAIZA Garcia for a hematology consultation. He requires evaluation for hyperferritinemia    Primary Physician: Aris Garcia     History Of Present Illness:  Mr. Rudolph is a 58 year old male who comes in today for evaluation regarding Hyperferritinemia.  Looking back in 2013 he had a ferritin level of 1310 with iron saturation index 59%.  At that time he had testing for hemochromatosis mutation and there was no mutation found in the HFE gene (C282Y mutation, H63D mutation, S65C mutations).  In 2015 and 2017 his ferritin was normal.  More recently in February 2018 it was 783 and now it is 475.  Iron saturation is 43%.  He tells me that he used to donate plasma regularly but in July 2017 he was told that he has low iron although in September 2017 ferritin was 234.  He never has donated packed red blood cells.  His last plasma donation was in the summer 2017.  He took iron pills on his own over-the-counter on average every other day for a few months but then stopped taking it.  He otherwise feels well.  He denies any pain.  Denies any fevers or infections.  No nausea vomiting diarrhea constipation or weight changes.  Over the last 1-1/2 years he has noticed a decrease in libido.  He denies any new skin problems.  He is not diabetic.  Otherwise energy is good and he remains fully functional.  Of note for the last many years he has been drinking a couple of vodka on a daily basis.      ROS:  A comprehensive ROS was otherwise neg      Past Medical/Surgical History:  Past Medical History:   Diagnosis Date     Adjustment disorder with depressed mood 1/12/2016     Carpal tunnel syndrome 1/5/2007     Chest wall contusion 12/2/2015     Depression with suicidal ideation 12/21/2015     Erectile Dysfunction 1/5/2007     Hyperlipidemia LDL goal <130 10/31/2010     Hypertension goal BP (blood pressure) < 140/90 5/11/2011      Impaired fasting glucose      LEFT FRONTAL LOBE ABSCESS 1/03    treated surgically, resolved     LFT elevation 5/30/2012     Past Surgical History:   Procedure Laterality Date     COLONOSCOPY WITH CO2 INSUFFLATION N/A 4/12/2018    Procedure: COLONOSCOPY WITH CO2 INSUFFLATION;  COLON SCREEN/ ARNULFO;  Surgeon: Quintin Patton MD;  Location: [facility] OR     CRANIOTOMY  01/2003    x 2, for treatment of brain abscess     Allergies:  Allergies as of 04/02/2019     (No Known Allergies)     Current Medications:  Current Outpatient Medications   Medication Sig Dispense Refill     FLUoxetine (PROZAC) 40 MG capsule Take 1 capsule (40 mg) by mouth daily 30 capsule 1     lisinopril-hydrochlorothiazide (PRINZIDE/ZESTORETIC) 20-25 MG tablet TAKE ONE TABLET BY MOUTH EVERY DAY FOR BLOOD PRESSURE 90 tablet 1     simvastatin (ZOCOR) 40 MG tablet Take 1 tablet (40 mg) by mouth every evening for cholesterol. 90 tablet 1     traZODone (DESYREL) 50 MG tablet TAKE ONE TABLET BY MOUTH EVERY DAY AT BEDTIME FOR DEPRESSION AND SLEEP 90 tablet 1     Multiple Vitamin (DAILY VITAMINS PO) Take 1 tablet by mouth daily         Family History:  Family History   Adopted: Yes   Problem Relation Age of Onset     Family History Negative No family hx of         Unknown, adopted   He is adopted so he does not know of his family history.  He does not have any children.      Social History:  Social History     Socioeconomic History     Marital status: Single     Spouse name: Not on file     Number of children: 0     Years of education: Not on file     Highest education level: Not on file   Occupational History     Occupation:      Employer: [employer]   Social Needs     Financial resource strain: Not on file     Food insecurity:     Worry: Not on file     Inability: Not on file     Transportation needs:     Medical: Not on file     Non-medical: Not on file   Tobacco Use     Smoking status: Never Smoker     Smokeless tobacco: Never  "Used   Substance and Sexual Activity     Alcohol use: Yes     Alcohol/week: 8.4 oz     Types: 14 Standard drinks or equivalent per week     Comment: on average, 2-3 vodka cranberries per day     Drug use: No     Sexual activity: Yes     Partners: Female   Lifestyle     Physical activity:     Days per week: Not on file     Minutes per session: Not on file     Stress: Not on file   Relationships     Social connections:     Talks on phone: Not on file     Gets together: Not on file     Attends [employer] service: Not on file     Active member of club or organization: Not on file     Attends meetings of clubs or organizations: Not on file     Relationship status: Not on file     Intimate partner violence:     Fear of current or ex partner: Not on file     Emotionally abused: Not on file     Physically abused: Not on file     Forced sexual activity: Not on file   Other Topics Concern      Service Not Asked     Blood Transfusions Not Asked     Caffeine Concern Yes     Comment: Coffee 2 cups per day     Occupational Exposure Not Asked     Hobby Hazards Not Asked     Sleep Concern Not Asked     Stress Concern Not Asked     Weight Concern Not Asked     Special Diet Not Asked     Back Care Not Asked     Exercise Yes     Comment: 3     Bike Helmet Not Asked     Seat Belt Not Asked     Self-Exams Not Asked   Social History Narrative     Not on file   He denies any smoking.  He drinks a couple of vodka on a daily basis and this has been going on for several years.  He occasionally smokes marijuana.  Denies any IV drug use.  He lives alone.  He is a .    Physical Exam:  /78 (BP Location: Right arm)   Pulse 80   Temp 98.1  F (36.7  C) (Oral)   Resp 16   Ht 1.765 m (5' 9.49\")   Wt 86.7 kg (191 lb 3 oz)   SpO2 97%   BMI 27.84 kg/m    CONSTITUTIONAL: no acute distress  EYES: PERRLA, no palor or icterus.   ENT/MOUTH: no mouth lesions. Ears normal  CVS: s1s2 no m r g .   RESPIRATORY: " clear to auscultation b/l  GI: soft non tender no hepatosplenomegaly  NEURO: AAOX3  Grossly non focal neuro exam  INTEGUMENT: no obvious rashes  LYMPHATIC: no palpable cervical, supraclavicular, axillary or inguinal LAD  MUSCULOSKELETAL: Unremarkable. No bony tenderness.   EXTREMITIES: no edema  PSYCH: Mentation, mood and affect are normal. Decision making capacity is intact    Laboratory/Imaging Studies  Results for orders placed or performed in visit on 03/08/19   5' Nucleotidase   Result Value Ref Range    5' Nucleotidase 12 0 - 15 U/L   Tissue transglutaminase antibody IgA   Result Value Ref Range    Tissue Transglutaminase Antibody IgA 1 <7 U/mL   Endomysial Antibody IgA by IFA   Result Value Ref Range    Endomysial Antibody IgA by IFA <1:10 <1:10   Deamidated Gliadin Peptide Ilsa IgA IgG   Result Value Ref Range    Deamidated Gliadin Ilsa, IgA 5 <7 U/mL    Deamidated Gliadin Ilsa, IgG 1 <7 U/mL   CRP, inflammation   Result Value Ref Range    CRP Inflammation 28.1 (H) 0.0 - 8.0 mg/L   ESR: Erythrocyte sedimentation rate   Result Value Ref Range    Sed Rate 24 (H) 0 - 20 mm/h   Rheumatoid factor   Result Value Ref Range    Rheumatoid Factor <20 <20 IU/mL   Cyclic Citrullinated Peptide Antibody IgG   Result Value Ref Range    Cyclic Citrullinated Peptide Antibody, IgG 1 <7 U/mL   Anti Nuclear Ilsa IgG by IFA with Reflex   Result Value Ref Range    GABRIELA interpretation Negative NEG^Negative   Soluble transferrin receptor   Result Value Ref Range    Soluble Transferrin Receptor 2.2 2.2 - 5.0 mg/L   Iron and iron binding capacity   Result Value Ref Range    Iron 146 35 - 180 ug/dL    Iron Binding Cap 343 240 - 430 ug/dL    Iron Saturation Index 43 15 - 46 %   Ferritin   Result Value Ref Range    Ferritin 475 (H) 26 - 388 ng/mL   Basic metabolic panel   Result Value Ref Range    Sodium 136 133 - 144 mmol/L    Potassium 4.0 3.4 - 5.3 mmol/L    Chloride 101 94 - 109 mmol/L    Carbon Dioxide 27 20 - 32 mmol/L    Anion Gap 8 3  - 14 mmol/L    Glucose 98 70 - 99 mg/dL    Urea Nitrogen 20 7 - 30 mg/dL    Creatinine 0.92 0.66 - 1.25 mg/dL    GFR Estimate >90 >60 mL/min/[1.73_m2]    GFR Estimate If Black >90 >60 mL/min/[1.73_m2]    Calcium 9.9 8.5 - 10.1 mg/dL   Hepatic panel   Result Value Ref Range    Bilirubin Direct 0.2 0.0 - 0.2 mg/dL    Bilirubin Total 0.6 0.2 - 1.3 mg/dL    Albumin 4.2 3.4 - 5.0 g/dL    Protein Total 8.1 6.8 - 8.8 g/dL    Alkaline Phosphatase 72 40 - 150 U/L    ALT 65 0 - 70 U/L    AST 56 (H) 0 - 45 U/L   Lipid panel reflex to direct LDL Non-fasting   Result Value Ref Range    Cholesterol 214 (H) <200 mg/dL    Triglycerides 89 <150 mg/dL    HDL Cholesterol 75 >39 mg/dL    LDL Cholesterol Calculated 121 (H) <100 mg/dL    Non HDL Cholesterol 139 (H) <130 mg/dL     ASSESSMENT/PLAN:    Hyperferritinemia with elevated iron saturation index usually represents iron overload in the body, and inherited hemochromatosis is the most likely explanation for that.  In the vast majority of the patients, we are able to detect a mutation in the HFE gene, but in a small minority of  patients, there could be mutations in other genes.  Especially for the type 3 and type 4B hereditary hemochromatosis there could be mutation in the TFR2 gene,   for type 3 and [id number] in type 4B.      In his case elevated ferritin is present but off-and-on his iron saturation has been in the normal range.  There were no mutations detected in the HFE gene.  He also drinks significant amount of alcohol which could be contributing to the elevated ferritin.    At this time I am not certain whether he has hereditary hemochromatosis or the elevated ferritin is secondary to alcohol use.    We discussed the situation in detail.  I strongly encouraged him to completely abstain from alcohol and then see where the ferritin goes and whether it settles down.  If the ferritin remains elevated despite abstaining from alcohol, it would be worthwhile considering checking for  more rare mutations related to hereditary hemochromatosis.    We discussed what hereditary hemochromatosis actually means and what excess iron overload can do to the body in case it is left untreated.  At this time his ferritin is mildly elevated so there is no emergency to treat it and I believe we have time to see how his iron studies do after complete abstinence from alcohol.    I would like to see him back in a couple of months with repeat labs prior and and I strongly encouraged him to remain alcohol free during this time.    He understands the situation well and he is willing to quit alcohol and then he will follow up with me in a couple of months.    I answered all of his questions to his satisfaction.  He is agreeable and comfortable with the plan.    Honorio Keller

## 2019-04-02 NOTE — LETTER
4/2/2019         RE: Ed Rudolph  [address]  [place] [address]        Dear Colleague,    Thank you for referring your patient, Ed Rudolph, to the [facility]. Please see a copy of my visit note below.    Hematology initial visit:  Date on this visit: 4/2/2019    Ed Rudolph  is referred by Dr.Jonathan FAIZA Garcia for a hematology consultation. He requires evaluation for hyperferritinemia    Primary Physician: Aris Garcia     History Of Present Illness:  Mr. Rudolph is a 58 year old male who comes in today for evaluation regarding Hyperferritinemia.  Looking back in 2013 he had a ferritin level of 1310 with iron saturation index 59%.  At that time he had testing for hemochromatosis mutation and there was no mutation found in the HFE gene (C282Y mutation, H63D mutation, S65C mutations).  In 2015 and 2017 his ferritin was normal.  More recently in February 2018 it was 783 and now it is 475.  Iron saturation is 43%.  He tells me that he used to donate plasma regularly but in July 2017 he was told that he has low iron although in September 2017 ferritin was 234.  He never has donated packed red blood cells.  His last plasma donation was in the summer 2017.  He took iron pills on his own over-the-counter on average every other day for a few months but then stopped taking it.  He otherwise feels well.  He denies any pain.  Denies any fevers or infections.  No nausea vomiting diarrhea constipation or weight changes.  Over the last 1-1/2 years he has noticed a decrease in libido.  He denies any new skin problems.  He is not diabetic.  Otherwise energy is good and he remains fully functional.  Of note for the last many years he has been drinking a couple of vodka on a daily basis.      ROS:  A comprehensive ROS was otherwise neg      Past Medical/Surgical History:  Past Medical History:   Diagnosis Date     Adjustment disorder with depressed mood 1/12/2016     Carpal  tunnel syndrome 1/5/2007     Chest wall contusion 12/2/2015     Depression with suicidal ideation 12/21/2015     Erectile Dysfunction 1/5/2007     Hyperlipidemia LDL goal <130 10/31/2010     Hypertension goal BP (blood pressure) < 140/90 5/11/2011     Impaired fasting glucose      LEFT FRONTAL LOBE ABSCESS 1/03    treated surgically, resolved     LFT elevation 5/30/2012     Past Surgical History:   Procedure Laterality Date     COLONOSCOPY WITH CO2 INSUFFLATION N/A 4/12/2018    Procedure: COLONOSCOPY WITH CO2 INSUFFLATION;  COLON SCREEN/ ARREDONDO;  Surgeon: Quintin Patton MD;  Location: [facility] OR     CRANIOTOMY  01/2003    x 2, for treatment of brain abscess     Allergies:  Allergies as of 04/02/2019     (No Known Allergies)     Current Medications:  Current Outpatient Medications   Medication Sig Dispense Refill     FLUoxetine (PROZAC) 40 MG capsule Take 1 capsule (40 mg) by mouth daily 30 capsule 1     lisinopril-hydrochlorothiazide (PRINZIDE/ZESTORETIC) 20-25 MG tablet TAKE ONE TABLET BY MOUTH EVERY DAY FOR BLOOD PRESSURE 90 tablet 1     simvastatin (ZOCOR) 40 MG tablet Take 1 tablet (40 mg) by mouth every evening for cholesterol. 90 tablet 1     traZODone (DESYREL) 50 MG tablet TAKE ONE TABLET BY MOUTH EVERY DAY AT BEDTIME FOR DEPRESSION AND SLEEP 90 tablet 1     Multiple Vitamin (DAILY VITAMINS PO) Take 1 tablet by mouth daily         Family History:  Family History   Adopted: Yes   Problem Relation Age of Onset     Family History Negative No family hx of         Unknown, adopted   He is adopted so he does not know of his family history.  He does not have any children.      Social History:  Social History     Socioeconomic History     Marital status: Single     Spouse name: Not on file     Number of children: 0     Years of education: Not on file     Highest education level: Not on file   Occupational History     Occupation:      Employer: [employer]   Social Needs     Financial  resource strain: Not on file     Food insecurity:     Worry: Not on file     Inability: Not on file     Transportation needs:     Medical: Not on file     Non-medical: Not on file   Tobacco Use     Smoking status: Never Smoker     Smokeless tobacco: Never Used   Substance and Sexual Activity     Alcohol use: Yes     Alcohol/week: 8.4 oz     Types: 14 Standard drinks or equivalent per week     Comment: on average, 2-3 vodka cranberries per day     Drug use: No     Sexual activity: Yes     Partners: Female   Lifestyle     Physical activity:     Days per week: Not on file     Minutes per session: Not on file     Stress: Not on file   Relationships     Social connections:     Talks on phone: Not on file     Gets together: Not on file     Attends Nondenominational service: Not on file     Active member of club or organization: Not on file     Attends meetings of clubs or organizations: Not on file     Relationship status: Not on file     Intimate partner violence:     Fear of current or ex partner: Not on file     Emotionally abused: Not on file     Physically abused: Not on file     Forced sexual activity: Not on file   Other Topics Concern      Service Not Asked     Blood Transfusions Not Asked     Caffeine Concern Yes     Comment: Coffee 2 cups per day     Occupational Exposure Not Asked     Hobby Hazards Not Asked     Sleep Concern Not Asked     Stress Concern Not Asked     Weight Concern Not Asked     Special Diet Not Asked     Back Care Not Asked     Exercise Yes     Comment: 3     Bike Helmet Not Asked     Seat Belt Not Asked     Self-Exams Not Asked   Social History Narrative     Not on file   He denies any smoking.  He drinks a couple of vodka on a daily basis and this has been going on for several years.  He occasionally smokes marijuana.  Denies any IV drug use.  He lives alone.  He is a .    Physical Exam:  /78 (BP Location: Right arm)   Pulse 80   Temp 98.1  F (36.7  C)  "(Oral)   Resp 16   Ht 1.765 m (5' 9.49\")   Wt 86.7 kg (191 lb 3 oz)   SpO2 97%   BMI 27.84 kg/m     CONSTITUTIONAL: no acute distress  EYES: PERRLA, no palor or icterus.   ENT/MOUTH: no mouth lesions. Ears normal  CVS: s1s2 no m r g .   RESPIRATORY: clear to auscultation b/l  GI: soft non tender no hepatosplenomegaly  NEURO: AAOX3  Grossly non focal neuro exam  INTEGUMENT: no obvious rashes  LYMPHATIC: no palpable cervical, supraclavicular, axillary or inguinal LAD  MUSCULOSKELETAL: Unremarkable. No bony tenderness.   EXTREMITIES: no edema  PSYCH: Mentation, mood and affect are normal. Decision making capacity is intact    Laboratory/Imaging Studies  Results for orders placed or performed in visit on 03/08/19   5' Nucleotidase   Result Value Ref Range    5' Nucleotidase 12 0 - 15 U/L   Tissue transglutaminase antibody IgA   Result Value Ref Range    Tissue Transglutaminase Antibody IgA 1 <7 U/mL   Endomysial Antibody IgA by IFA   Result Value Ref Range    Endomysial Antibody IgA by IFA <1:10 <1:10   Deamidated Gliadin Peptide Ilsa IgA IgG   Result Value Ref Range    Deamidated Gliadin Ilsa, IgA 5 <7 U/mL    Deamidated Gliadin Ilsa, IgG 1 <7 U/mL   CRP, inflammation   Result Value Ref Range    CRP Inflammation 28.1 (H) 0.0 - 8.0 mg/L   ESR: Erythrocyte sedimentation rate   Result Value Ref Range    Sed Rate 24 (H) 0 - 20 mm/h   Rheumatoid factor   Result Value Ref Range    Rheumatoid Factor <20 <20 IU/mL   Cyclic Citrullinated Peptide Antibody IgG   Result Value Ref Range    Cyclic Citrullinated Peptide Antibody, IgG 1 <7 U/mL   Anti Nuclear Ilsa IgG by IFA with Reflex   Result Value Ref Range    GABRIELA interpretation Negative NEG^Negative   Soluble transferrin receptor   Result Value Ref Range    Soluble Transferrin Receptor 2.2 2.2 - 5.0 mg/L   Iron and iron binding capacity   Result Value Ref Range    Iron 146 35 - 180 ug/dL    Iron Binding Cap 343 240 - 430 ug/dL    Iron Saturation Index 43 15 - 46 %   Ferritin "   Result Value Ref Range    Ferritin 475 (H) 26 - 388 ng/mL   Basic metabolic panel   Result Value Ref Range    Sodium 136 133 - 144 mmol/L    Potassium 4.0 3.4 - 5.3 mmol/L    Chloride 101 94 - 109 mmol/L    Carbon Dioxide 27 20 - 32 mmol/L    Anion Gap 8 3 - 14 mmol/L    Glucose 98 70 - 99 mg/dL    Urea Nitrogen 20 7 - 30 mg/dL    Creatinine 0.92 0.66 - 1.25 mg/dL    GFR Estimate >90 >60 mL/min/[1.73_m2]    GFR Estimate If Black >90 >60 mL/min/[1.73_m2]    Calcium 9.9 8.5 - 10.1 mg/dL   Hepatic panel   Result Value Ref Range    Bilirubin Direct 0.2 0.0 - 0.2 mg/dL    Bilirubin Total 0.6 0.2 - 1.3 mg/dL    Albumin 4.2 3.4 - 5.0 g/dL    Protein Total 8.1 6.8 - 8.8 g/dL    Alkaline Phosphatase 72 40 - 150 U/L    ALT 65 0 - 70 U/L    AST 56 (H) 0 - 45 U/L   Lipid panel reflex to direct LDL Non-fasting   Result Value Ref Range    Cholesterol 214 (H) <200 mg/dL    Triglycerides 89 <150 mg/dL    HDL Cholesterol 75 >39 mg/dL    LDL Cholesterol Calculated 121 (H) <100 mg/dL    Non HDL Cholesterol 139 (H) <130 mg/dL     ASSESSMENT/PLAN:    Hyperferritinemia with elevated iron saturation index usually represents iron overload in the body, and inherited hemochromatosis is the most likely explanation for that.  In the vast majority of the patients, we are able to detect a mutation in the HFE gene, but in a small minority of  patients, there could be mutations in other genes.  Especially for the type 3 and type 4B hereditary hemochromatosis there could be mutation in the TFR2 gene,   for type 3 and [id number] in type 4B.      In his case elevated ferritin is present but off-and-on his iron saturation has been in the normal range.  There were no mutations detected in the HFE gene.  He also drinks significant amount of alcohol which could be contributing to the elevated ferritin.    At this time I am not certain whether he has hereditary hemochromatosis or the elevated ferritin is secondary to alcohol use.    We discussed the  situation in detail.  I strongly encouraged him to completely abstain from alcohol and then see where the ferritin goes and whether it settles down.  If the ferritin remains elevated despite abstaining from alcohol, it would be worthwhile considering checking for more rare mutations related to hereditary hemochromatosis.    We discussed what hereditary hemochromatosis actually means and what excess iron overload can do to the body in case it is left untreated.  At this time his ferritin is mildly elevated so there is no emergency to treat it and I believe we have time to see how his iron studies do after complete abstinence from alcohol.    I would like to see him back in a couple of months with repeat labs prior and and I strongly encouraged him to remain alcohol free during this time.    He understands the situation well and he is willing to quit alcohol and then he will follow up with me in a couple of months.    I answered all of his questions to his satisfaction.  He is agreeable and comfortable with the plan.    Honorio Keller      Again, thank you for allowing me to participate in the care of your patient.        Sincerely,        Honorio Keller MD

## 2019-04-02 NOTE — PATIENT INSTRUCTIONS
Please completely abstain from alcohol    Repeat labs in 2 months and see me a few days after that

## 2019-05-24 DIAGNOSIS — F33.0 MAJOR DEPRESSIVE DISORDER, RECURRENT EPISODE, MILD (H): ICD-10-CM

## 2019-05-24 NOTE — TELEPHONE ENCOUNTER
"Requested Prescriptions   Pending Prescriptions Disp Refills     FLUoxetine (PROZAC) 40 MG capsule [Pharmacy Med Name: FLUOXETINE HCL 40MG CAPS]  Last Written Prescription Date:  03/05/19  Last Fill Quantity: 30,  # refills: 1   Last Office Visit with [facility], [facility] or [facility] prescribing provider:  03/05/19Alen   Future Office Visit:    Next 5 appointments (look out 90 days)    Jun 05, 2019  4:15 PM CDT  Return Visit with Honorio Keller MD  [facility] [address] [place] [id number] [phone]        30 capsule 1     Sig: TAKE ONE CAPSULE BY MOUTH ONCE DAILY       SSRIs Protocol Passed - 5/24/2019  2:13 PM        Passed - PHQ-9 score less than 5 in past 6 months     Please review last PHQ-9 score.           Passed - Medication is active on med list        Passed - Patient is age 18 or older        Passed - Recent (6 mo) or future (30 days) visit within the authorizing provider's specialty     Patient had office visit in the last 6 months or has a visit in the next 30 days with authorizing provider or within the authorizing provider's specialty.  See \"Patient Info\" tab in inbasket, or \"Choose Columns\" in Meds & Orders section of the refill encounter.              "

## 2019-05-28 RX ORDER — FLUOXETINE 40 MG/1
CAPSULE ORAL
Qty: 90 CAPSULE | Refills: 0 | Status: SHIPPED | OUTPATIENT
Start: 2019-05-28 | End: 2020-10-23

## 2019-09-20 ENCOUNTER — TELEPHONE (OUTPATIENT)
Dept: FAMILY MEDICINE | Facility: CLINIC | Age: 59
End: 2019-09-20

## 2019-09-20 NOTE — TELEPHONE ENCOUNTER
Panel Management Review   One phone call and send letter if unable to reach them or [employer] message and send letter if not read after 2 weeks (You will get a message to your inbasket)      BP Readings from Last 1 Encounters:   04/02/19 129/78        Health Maintenance Due   Topic Date Due     ADVANCE CARE PLANNING  1960     ZOSTER IMMUNIZATION (1 of 2) 05/07/2011     PREVENTIVE CARE VISIT  02/06/2019     INFLUENZA VACCINE (1) 09/01/2019     PHQ-9  09/05/2019        Fail List measure:     Depression / Dysthymia review    Measure:  Needs PHQ-9 score of 4 or less during index window.  Administer PHQ-9 and if score is 5 or more, send encounter to provider for next steps.        PHQ-9 SCORE 2/6/2018 8/22/2018 3/5/2019   PHQ-9 Total Score 6 18 0       If PHQ-9 recheck is 5 or more, route to provider for next steps.    Patient is due for:  PHQ9        Patient is due/failing the following:   PHQ9    Action needed:   Patient needs to do PHQ9.    Type of outreach:    Phone, left message for patient to call back.     Questions for provider review:    None                                                                                       Chart routed to Care Team .                                            Colleen Chavarria MA

## 2019-11-05 ENCOUNTER — HEALTH MAINTENANCE LETTER (OUTPATIENT)
Age: 59
End: 2019-11-05

## 2020-01-06 DIAGNOSIS — F33.0 MAJOR DEPRESSIVE DISORDER, RECURRENT EPISODE, MILD (H): ICD-10-CM

## 2020-01-06 DIAGNOSIS — F51.01 PRIMARY INSOMNIA: ICD-10-CM

## 2020-01-06 NOTE — LETTER
February 7, 2020        Ed Rudolph  [address]  [place] [address]        Dear Ed,    This is a friendly reminder that you are due for depression follow up with a simple, quick depression questionnaire called a PHQ-9. We have been unsuccessful in reaching your by phone.     This questionnaire has two main purposes: 1)  It helps your healthcare provider to determine your response to the care we have provided you and helps us guide further management of your mental well being.  2)  Reassure your healthcare provider that your symptoms are stable, if you are no longer experiencing depression symptoms.    Reminder you are also due to see us back in the office mid March with Dr. Garcia. Please reach the clinic back to go over the questions and schedule an appointment by calling us at [phone]. Thank you for your cooperation.    Sincerely,        [facility] Staff/[facility]

## 2020-01-07 NOTE — TELEPHONE ENCOUNTER
"Requested Prescriptions   Pending Prescriptions Disp Refills     traZODone (DESYREL) 50 MG tablet [Pharmacy Med Name: TRAZODONE HCL 50MG TABS]  Last Written Prescription Date:  3/5/19  Last Fill Quantity: 90,  # refills: 1   Last Office Visit with [facility], [facility] or [facility] prescribing provider:  3/5/19   Future Office Visit:      90 tablet 1     Sig: TAKE ONE TABLET BY MOUTH EVERY NIGHT AT BEDTIME FOR DEPRESSION AND SLEEP       Serotonin Modulators Passed - 1/6/2020  5:03 PM        Passed - Recent (12 mo) or future (30 days) visit within the authorizing provider's specialty     Patient has had an office visit with the authorizing provider or a provider within the authorizing providers department within the previous 12 mos or has a future within next 30 days. See \"Patient Info\" tab in inbasket, or \"Choose Columns\" in Meds & Orders section of the refill encounter.              Passed - Medication is active on med list        Passed - Patient is age 18 or older          "

## 2020-01-08 NOTE — TELEPHONE ENCOUNTER
PHQ-9 score:    PHQ-9 SCORE 3/5/2019   PHQ-9 Total Score 0       Routing refill request to provider for review/approval because:  Dx of depression associated with medication and PHQ9 needs to be updated to pass [facility] refill protocol    Janie Luna RN

## 2020-01-09 RX ORDER — TRAZODONE HYDROCHLORIDE 50 MG/1
TABLET, FILM COATED ORAL
Qty: 90 TABLET | Refills: 0 | Status: SHIPPED | OUTPATIENT
Start: 2020-01-09 | End: 2020-10-23

## 2020-01-09 NOTE — TELEPHONE ENCOUNTER
Please update PHQ-9       Let patient know he needs to be seen before mid-March for further refills after this.

## 2020-01-17 NOTE — TELEPHONE ENCOUNTER
This writer attempted to contact patient on 01/17/20      Reason for call update phq9 and left message.      If patient calls back:   1st floor [place] Care Team (MA/TC) called. Inform patient that someone from the team will contact them, document that pt called and route to care team.         Deb Scanlon MA

## 2020-01-20 NOTE — TELEPHONE ENCOUNTER
This writer attempted to contact patient on 01/20/20      Reason for call update phq9 and left message.      If patient calls back:   1st floor [place] Care Team (MA/TC) called. Inform patient that someone from the team will contact them, document that pt called and route to care team.         Deb Scanlon MA

## 2020-02-07 NOTE — TELEPHONE ENCOUNTER
This writer attempted to contact patient on 2/7/20        Reason for call update phq9 and left message.        If patient calls back:              1st floor [place] Care Team (MA/TC) called. Inform patient that someone from the team will contact them, document that pt called and route to care team.          Letter sent to patient due to multiple attempts.  Gatito Mcintosh CMA

## 2020-02-21 DIAGNOSIS — I10 ESSENTIAL HYPERTENSION WITH GOAL BLOOD PRESSURE LESS THAN 140/90: ICD-10-CM

## 2020-02-21 DIAGNOSIS — E78.5 HYPERLIPIDEMIA LDL GOAL <130: ICD-10-CM

## 2020-02-21 NOTE — TELEPHONE ENCOUNTER
"Requested Prescriptions   Pending Prescriptions Disp Refills     lisinopril-hydrochlorothiazide 20-25 MG PO tablet [Pharmacy Med Name: LISINOPRIL-HCTZ 20/25MG TABLETS]  Last Written Prescription Date:  3/5/19  Last Fill Quantity: 90,  # refills: 1   Last office visit: 3/5/2019 with prescribing provider:  Jose   Future Office Visit:     90 tablet 1     Sig: TAKE ONE TABLET BY MOUTH ONCE DAILY FOR BLOOD PRESSURE       Diuretics (Including Combos) Protocol Passed - 2/21/2020  2:09 PM        Passed - Blood pressure under 140/90 in past 12 months     BP Readings from Last 3 Encounters:   04/02/19 129/78   03/05/19 131/77   08/22/18 126/78                 Passed - Recent (12 mo) or future (30 days) visit within the authorizing provider's specialty     Patient has had an office visit with the authorizing provider or a provider within the authorizing providers department within the previous 12 mos or has a future within next 30 days. See \"Patient Info\" tab in inbasket, or \"Choose Columns\" in Meds & Orders section of the refill encounter.              Passed - Medication is active on med list        Passed - Patient is age 18 or older        Passed - Normal serum creatinine on file in past 12 months     Recent Labs   Lab Test 03/08/19  1057   CR 0.92              Passed - Normal serum potassium on file in past 12 months     Recent Labs   Lab Test 03/08/19  1057   POTASSIUM 4.0                    Passed - Normal serum sodium on file in past 12 months     Recent Labs   Lab Test 03/08/19  1057                 SIMVASTATIN 40 MG PO tablet [Pharmacy Med Name: SIMVASTATIN 40MG TABLETS]  Last Written Prescription Date:  3/5/19  Last Fill Quantity: 90,  # refills: 1   Last office visit: 3/5/2019 with prescribing provider:  Jose   Future Office Visit:     90 tablet 1     Sig: TAKE 1 TABLET BY MOUTH EVERY EVENING AS NEEDED CHLOESTEROL       Statins Protocol Passed - 2/21/2020  2:09 PM        Passed - LDL on file in past 12 " "months     Recent Labs   Lab Test 03/08/19  1057   *             Passed - No abnormal creatine kinase in past 12 months     No lab results found.             Passed - Recent (12 mo) or future (30 days) visit within the authorizing provider's specialty     Patient has had an office visit with the authorizing provider or a provider within the authorizing providers department within the previous 12 mos or has a future within next 30 days. See \"Patient Info\" tab in inbasket, or \"Choose Columns\" in Meds & Orders section of the refill encounter.              Passed - Medication is active on med list        Passed - Patient is age 18 or older          "

## 2020-02-24 NOTE — TELEPHONE ENCOUNTER
Routing refill request to provider for review/approval because: Lisinopril and simvastatin  A break in medication      Sinai Zapata RN

## 2020-02-25 RX ORDER — LISINOPRIL AND HYDROCHLOROTHIAZIDE 20; 25 MG/1; MG/1
TABLET ORAL
Qty: 90 TABLET | Refills: 1 | OUTPATIENT
Start: 2020-02-25

## 2020-02-25 RX ORDER — LISINOPRIL AND HYDROCHLOROTHIAZIDE 20; 25 MG/1; MG/1
1 TABLET ORAL DAILY
Qty: 30 TABLET | Refills: 0 | Status: SHIPPED | OUTPATIENT
Start: 2020-02-25 | End: 2020-04-21

## 2020-02-25 RX ORDER — SIMVASTATIN 40 MG
40 TABLET ORAL EVERY EVENING
Qty: 30 TABLET | Refills: 0 | Status: SHIPPED | OUTPATIENT
Start: 2020-02-25 | End: 2020-04-21

## 2020-04-13 DIAGNOSIS — I10 ESSENTIAL HYPERTENSION WITH GOAL BLOOD PRESSURE LESS THAN 140/90: ICD-10-CM

## 2020-04-13 NOTE — TELEPHONE ENCOUNTER
"Requested Prescriptions   Pending Prescriptions Disp Refills     lisinopril-hydrochlorothiazide (ZESTORETIC) 20-25 MG tablet [Pharmacy Med Name: LISINOPRIL-HCTZ 20/25MG TABLETS]  Last Written Prescription Date:  02/25/2020  Last Fill Quantity: 30,  # refills: 0   Last Office Visit with [facility] or [facility] prescribing provider:  03/05/19Alen   Future Office Visit:    30 tablet 0     Sig: TAKE 1 TABLET BY MOUTH DAILY FOR BLOOD PRESSURE       Diuretics (Including Combos) Protocol Failed - 4/13/2020 12:24 PM        Failed - Blood pressure under 140/90 in past 12 months     BP Readings from Last 3 Encounters:   04/02/19 129/78   03/05/19 131/77   08/22/18 126/78                 Failed - Recent (12 mo) or future (30 days) visit within the authorizing provider's specialty     Patient has had an office visit with the authorizing provider or a provider within the authorizing providers department within the previous 12 mos or has a future within next 30 days. See \"Patient Info\" tab in inbasket, or \"Choose Columns\" in Meds & Orders section of the refill encounter.              Failed - Normal serum creatinine on file in past 12 months     Recent Labs   Lab Test 03/08/19  1057   CR 0.92              Failed - Normal serum potassium on file in past 12 months     Recent Labs   Lab Test 03/08/19  1057   POTASSIUM 4.0                    Failed - Normal serum sodium on file in past 12 months     Recent Labs   Lab Test 03/08/19  1057                 Passed - Medication is active on med list        Passed - Patient is age 18 or older       ACE Inhibitors (Including Combos) Protocol Failed - 4/13/2020 12:24 PM        Failed - Blood pressure under 140/90 in past 12 months     BP Readings from Last 3 Encounters:   04/02/19 129/78   03/05/19 131/77   08/22/18 126/78                 Failed - Recent (12 mo) or future (30 days) visit within the authorizing provider's specialty     Patient has had an office visit with the authorizing " "provider or a provider within the authorizing providers department within the previous 12 mos or has a future within next 30 days. See \"Patient Info\" tab in inbasket, or \"Choose Columns\" in Meds & Orders section of the refill encounter.              Failed - Normal serum creatinine on file in past 12 months     Recent Labs   Lab Test 03/08/19  1057   CR 0.92       Ok to refill medication if creatinine is low          Failed - Normal serum potassium on file in past 12 months     Recent Labs   Lab Test 03/08/19  1057   POTASSIUM 4.0             Passed - Medication is active on med list        Passed - Patient is age 18 or older             "

## 2020-04-16 NOTE — TELEPHONE ENCOUNTER
TC/MA to call and schedule the patient for a visit. Please ask if there is enough medication/supplies to last till scheduled appointment. Then route back to RN's.    Petra Alex RN, [facility] Triage

## 2020-04-17 NOTE — TELEPHONE ENCOUNTER
Team. Please find out what patient's question is. Also see Petra's documentation regarding next steps for refill.     Brinda Coffey RN  [facility]

## 2020-04-17 NOTE — TELEPHONE ENCOUNTER
Called and spoke with patient and scheduled him a telephone visit with PCP. Patient was just confused why he needed an appointment for further refills but he is good on medications for now. Patient stated that he has enough medication to last him until visit. Visit is on 04/21/2020.    Jose Eduardo Mcnamara CMA on 4/17/2020 at 4:59 PM

## 2020-04-17 NOTE — TELEPHONE ENCOUNTER
Reason for Call:  Other call back    Detailed comments: Pt would like to request a call back regarding this medication.Thank you.    Phone Number Patient can be reached at: Work number on file:  [phone] (work)    Best Time: Any    Can we leave a detailed message on this number? YES    Call taken on 4/17/2020 at 2:21 PM by Erika Thomas

## 2020-04-20 RX ORDER — LISINOPRIL AND HYDROCHLOROTHIAZIDE 20; 25 MG/1; MG/1
1 TABLET ORAL DAILY
Qty: 30 TABLET | Refills: 0 | OUTPATIENT
Start: 2020-04-20

## 2020-04-21 ENCOUNTER — VIRTUAL VISIT (OUTPATIENT)
Dept: FAMILY MEDICINE | Facility: CLINIC | Age: 60
End: 2020-04-21
Payer: COMMERCIAL

## 2020-04-21 DIAGNOSIS — E78.5 HYPERLIPIDEMIA LDL GOAL <130: ICD-10-CM

## 2020-04-21 DIAGNOSIS — I10 ESSENTIAL HYPERTENSION WITH GOAL BLOOD PRESSURE LESS THAN 140/90: Primary | ICD-10-CM

## 2020-04-21 PROCEDURE — 99214 OFFICE O/P EST MOD 30 MIN: CPT | Mod: 95 | Performed by: FAMILY MEDICINE

## 2020-04-21 RX ORDER — SIMVASTATIN 40 MG
40 TABLET ORAL EVERY EVENING
Qty: 90 TABLET | Refills: 1 | Status: SHIPPED | OUTPATIENT
Start: 2020-04-21 | End: 2020-10-15

## 2020-04-21 RX ORDER — LISINOPRIL AND HYDROCHLOROTHIAZIDE 20; 25 MG/1; MG/1
1 TABLET ORAL DAILY
Qty: 90 TABLET | Refills: 1 | Status: SHIPPED | OUTPATIENT
Start: 2020-04-21 | End: 2020-10-15

## 2020-04-21 ASSESSMENT — PATIENT HEALTH QUESTIONNAIRE - PHQ9: SUM OF ALL RESPONSES TO PHQ QUESTIONS 1-9: 5

## 2020-04-21 NOTE — PATIENT INSTRUCTIONS
At [facility], we strive to deliver an exceptional experience to you, every time we see you. If you receive a survey, please complete it as we do value your feedback.  If you have MyChart, you can expect to receive results automatically within 24 hours of their completion.  Your provider will send a note interpreting your results as well.   If you do not have MyChart, you should receive your results in about a week by mail.    Your care team:                            Family Medicine Internal Medicine   MD Marquez Dunaway MD Shantel Branch-Fleming, MD Katya Georgiev PA-C Megan Hill, APRZEINA Darnell, MD Pediatrics   Nicolas Vallejo, PAHERMES Rosa, MD Raisa Abarca APRN CNP   MD Pau Silva MD Deborah Mielke, MD Kim Thein, APRN [facility]      Clinic hours: Monday - Thursday 7 am-7 pm; Fridays 7 am-5 pm.   Urgent care: Monday - Friday 11 am-9 pm; Saturday and Sunday 9 am-5 pm.    Clinic: [phone]       [facility]: Monday - Thursday 8 am - 7 pm; Friday 8 am - 6 pm  [facility]: [phone]     Use [email or web address] for 24/7 diagnosis and treatment of dozens of conditions.

## 2020-04-21 NOTE — PROGRESS NOTES
"Ed Rudolph is a 59 year old male who is being evaluated via a billable telephone visit.      The patient has been notified of following:     \"This telephone visit will be conducted via a call between you and your physician/provider. We have found that certain health care needs can be provided without the need for a physical exam.  This service lets us provide the care you need with a short phone conversation.  If a prescription is necessary we can send it directly to your pharmacy.  If lab work is needed we can place an order for that and you can then stop by our lab to have the test done at a later time.    Telephone visits are billed at different rates depending on your insurance coverage. During this emergency period, for some insurers they may be billed the same as an in-person visit.  Please reach out to your insurance provider with any questions.    If during the course of the call the physician/provider feels a telephone visit is not appropriate, you will not be charged for this service.\"    Patient has given verbal consent for Telephone visit?  Yes    How would you like to obtain your AVS? Mail to patient    Subjective     Ed Rudolph is a 59 year old male who presents to clinic today for the following health issues:    Hyperlipidemia Follow-Up      Are you regularly taking any medication or supplement to lower your cholesterol?   Yes- Zocor    Are you having muscle aches or other side effects that you think could be caused by your cholesterol lowering medication?  No    Hypertension Follow-up      Do you check your blood pressure regularly outside of the clinic? No     Are you following a low salt diet? No    Are your blood pressures ever more than 140 on the top number (systolic) OR more   than 90 on the bottom number (diastolic), for example 140/90? N/A      How many servings of fruits and vegetables do you eat daily?  2-3    On average, how many sweetened beverages do you drink each day " (Examples: soda, juice, sweet tea, etc.  Do NOT count diet or artificially sweetened beverages)?   3    How many days per week do you exercise enough to make your heart beat faster? non    How many minutes a day do you exercise enough to make your heart beat faster? none    How many days per week do you miss taking your medication? 0    Past medical, family, and social histories, medications, and allergies are reviewed and updated in [facility].     Review of Systems   ROS COMP: Constitutional, HEENT, cardiovascular, pulmonary, gi and gu systems are negative, except as otherwise noted.       Objective   Reported vitals:  There were no vitals taken for this visit.   PSYCH: Alert and oriented times 3; coherent speech, normal   rate and volume, able to articulate logical thoughts, able   to abstract reason, no tangential thoughts, no hallucinations   or delusions  His affect is normal  RESP: No cough, no audible wheezing, able to talk in full sentences  Remainder of exam unable to be completed due to telephone visits          ASSESSMENT/PLAN:  (I10) Essential hypertension with goal blood pressure less than 140/90  (primary encounter diagnosis)  Comment:   Plan: lisinopril-hydrochlorothiazide (ZESTORETIC)         20-25 MG tablet, Comprehensive metabolic panel         (BMP + Alb, Alk Phos, ALT, AST, Total. Bili,         TP)        Return in about 6 months (around 10/21/2020) for full physical, cholesterol, blood pressure check.      (E78.5) Hyperlipidemia LDL goal <130  Comment: recommend fasting labs in near future, but could also be done at next blood draw for Heme/Onc dept  Plan: simvastatin (ZOCOR) 40 MG tablet, Lipid panel         reflex to direct LDL Non-fasting, Comprehensive        metabolic panel (BMP + Alb, Alk Phos, ALT, AST,        Total. Bili, TP)        Return in about 6 months (around 10/21/2020) for full physical, cholesterol, blood pressure check.     No follow-ups on file.      Phone call duration:  5  minutes    Aris Garcia MD

## 2020-09-04 DIAGNOSIS — I10 ESSENTIAL HYPERTENSION WITH GOAL BLOOD PRESSURE LESS THAN 140/90: ICD-10-CM

## 2020-09-04 DIAGNOSIS — E78.5 HYPERLIPIDEMIA LDL GOAL <130: ICD-10-CM

## 2020-09-04 LAB
ALBUMIN SERPL-MCNC: 3.7 G/DL (ref 3.4–5)
ALP SERPL-CCNC: 66 U/L (ref 40–150)
ALT SERPL W P-5'-P-CCNC: 84 U/L (ref 0–70)
ANION GAP SERPL CALCULATED.3IONS-SCNC: 8 MMOL/L (ref 3–14)
AST SERPL W P-5'-P-CCNC: 101 U/L (ref 0–45)
BILIRUB SERPL-MCNC: 0.6 MG/DL (ref 0.2–1.3)
BUN SERPL-MCNC: 26 MG/DL (ref 7–30)
CALCIUM SERPL-MCNC: 9.1 MG/DL (ref 8.5–10.1)
CHLORIDE SERPL-SCNC: 104 MMOL/L (ref 94–109)
CHOLEST SERPL-MCNC: 194 MG/DL
CO2 SERPL-SCNC: 24 MMOL/L (ref 20–32)
CREAT SERPL-MCNC: 1.37 MG/DL (ref 0.66–1.25)
GFR SERPL CREATININE-BSD FRML MDRD: 56 ML/MIN/{1.73_M2}
GLUCOSE SERPL-MCNC: 91 MG/DL (ref 70–99)
HDLC SERPL-MCNC: 48 MG/DL
LDLC SERPL CALC-MCNC: ABNORMAL MG/DL
LDLC SERPL DIRECT ASSAY-MCNC: 62 MG/DL
NONHDLC SERPL-MCNC: 146 MG/DL
POTASSIUM SERPL-SCNC: 4 MMOL/L (ref 3.4–5.3)
PROT SERPL-MCNC: 7.2 G/DL (ref 6.8–8.8)
SODIUM SERPL-SCNC: 136 MMOL/L (ref 133–144)
TRIGL SERPL-MCNC: 424 MG/DL

## 2020-09-04 PROCEDURE — 83721 ASSAY OF BLOOD LIPOPROTEIN: CPT | Performed by: FAMILY MEDICINE

## 2020-09-04 PROCEDURE — 36415 COLL VENOUS BLD VENIPUNCTURE: CPT | Performed by: FAMILY MEDICINE

## 2020-09-04 PROCEDURE — 80061 LIPID PANEL: CPT | Performed by: FAMILY MEDICINE

## 2020-09-04 PROCEDURE — 80053 COMPREHEN METABOLIC PANEL: CPT | Performed by: FAMILY MEDICINE

## 2020-10-02 ENCOUNTER — OFFICE VISIT (OUTPATIENT)
Dept: URGENT CARE | Facility: URGENT CARE | Age: 60
End: 2020-10-02
Payer: COMMERCIAL

## 2020-10-02 VITALS
RESPIRATION RATE: 18 BRPM | DIASTOLIC BLOOD PRESSURE: 76 MMHG | TEMPERATURE: 98 F | SYSTOLIC BLOOD PRESSURE: 134 MMHG | OXYGEN SATURATION: 99 % | BODY MASS INDEX: 24.93 KG/M2 | HEART RATE: 90 BPM | WEIGHT: 171.2 LBS

## 2020-10-02 DIAGNOSIS — R10.11 RUQ ABDOMINAL PAIN: Primary | ICD-10-CM

## 2020-10-02 DIAGNOSIS — F10.10 ALCOHOL ABUSE: ICD-10-CM

## 2020-10-02 DIAGNOSIS — R10.13 EPIGASTRIC PAIN: ICD-10-CM

## 2020-10-02 PROCEDURE — 99214 OFFICE O/P EST MOD 30 MIN: CPT | Performed by: PHYSICIAN ASSISTANT

## 2020-10-02 ASSESSMENT — PAIN SCALES - GENERAL: PAINLEVEL: EXTREME PAIN (8)

## 2020-10-02 NOTE — PROGRESS NOTES
S: 60-year-old male who drinks 1 pint of vodka per day presents for abdominal pain for 3 days and possible dehydration.  His mouth has felt very dry.  He has never had pain like this before in the past.  He denies headache, dizziness, shortness of breath, chest pain.  No nausea or vomiting.  No rash.  Has been using ibuprofen.  No dysuria, frequency, urgency or hesitancy.  Stools have been soft.  No blood in his stools.  Same frequency of stools.  He is not diabetic.  He saw Dr. Garcia 1 month ago and was worried as his liver enzymes were elevated.  In the past in April he was referred to hematologist/oncologist for increased ferritin and liver enzymes.  It was felt he could possibly have hereditary hemochromatosis.  He was supposed to follow-up in 2 months but never did.  He rates that abdominal pain 7 over 10.    No Known Allergies    Past Medical History:   Diagnosis Date     Adjustment disorder with depressed mood 1/12/2016     Carpal tunnel syndrome 1/5/2007     Chest wall contusion 12/2/2015     Depression with suicidal ideation 12/21/2015     Erectile Dysfunction 1/5/2007     Hyperlipidemia LDL goal <130 10/31/2010     Hypertension goal BP (blood pressure) < 140/90 5/11/2011     Impaired fasting glucose      LEFT FRONTAL LOBE ABSCESS 1/03    treated surgically, resolved     LFT elevation 5/30/2012            lisinopril-hydrochlorothiazide (ZESTORETIC) 20-25 MG tablet, Take 1 tablet by mouth daily for blood pressure.       simvastatin (ZOCOR) 40 MG tablet, Take 1 tablet (40 mg) by mouth every evening for cholesterol.       traZODone (DESYREL) 50 MG tablet, TAKE ONE TABLET BY MOUTH EVERY NIGHT AT BEDTIME FOR DEPRESSION AND SLEEP       FLUoxetine (PROZAC) 40 MG capsule, TAKE ONE CAPSULE BY MOUTH ONCE DAILY (Patient not taking: Reported on 10/2/2020)       Multiple Vitamin (DAILY VITAMINS PO), Take 1 tablet by mouth daily     No current facility-administered medications on file prior to visit.       Social History      Tobacco Use     Smoking status: Never Smoker     Smokeless tobacco: Never Used   Substance Use Topics     Alcohol use: Yes     Alcohol/week: 14.0 standard drinks     Types: 14 Standard drinks or equivalent per week     Comment: on average, 2-3 vodka cranberries per day       ROS:  CONSTITUTIONAL: Negative for fatigue or fever.  EYES: Negative for eye problems.  ENT: Negative for sore throat   RESP: Negative for cough   CV: Negative for chest pains.  GI: Negative for vomiting.  MUSCULOSKELETAL:  Negative for significant muscle or joint pains.  NEUROLOGIC: Negative for headaches.  SKIN: Negative for rash.  PSYCH: Normal mentation for age.    OBJECTIVE:  /76 (BP Location: Left arm, Patient Position: Sitting, Cuff Size: Adult Large)   Pulse 90   Temp 98  F (36.7  C) (Tympanic)   Resp 18   Wt 77.7 kg (171 lb 3.2 oz)   SpO2 99%   BMI 24.93 kg/m    GENERAL APPEARANCE: Healthy, alert and no distress.  EYES:Conjunctiva/sclera clear.  EARS: No cerumen.   Ear canals w/o erythema.  TM's intact w/o erythema.    NOSE/MOUTH: Nose without ulcers, erythema or lesions.  SINUSES: No maxillary sinus tenderness.  THROAT: No erythema w/o tonsillar enlargement . No exudates.  NECK: Supple, nontender, no lymphadenopathy.  RESP: Lungs clear to auscultation - no rales, rhonchi or wheezes  CV: Regular rate and rhythm, normal S1 S2, no murmur noted.  NEURO: Awake, alert    SKIN: No rashes  Abdomen-soft.  Normal active bowel sounds.  No hepatosplenomegaly.  Has mild to moderate right upper quadrant tenderness and epigastric tenderness.  No rigidity, guarding or rebound.      ASSESSMENT:     ICD-10-CM    1. RUQ abdominal pain  R10.11    2. Epigastric pain  R10.13    3. Alcohol abuse  F10.10          PLAN: Differential: hepatitis, cirrhosis, gallstones, pancreatitis, PUD.  History of alcohol abuse.  History of increased ferritin and possible hereditary hemochromatosis.  With the pain being 7 out of 10 and the complicated history I  feel it is best if he were evaluated in the emergency room where they have advanced imaging capabilities.  I have discussed clinical findings with patient.  All questions are answered, patient indicates understanding of these issues and is in agreement with plan.   Patient care instructions are discussed/given at the end of visit.   Deysi Platt PA-C

## 2020-10-02 NOTE — PATIENT INSTRUCTIONS
7/10 epigastric and right upper quadrant pain for 3 days. To ER for evaluation. History of elevated ferritin, possible hereditary hemochromatosis

## 2020-10-15 ENCOUNTER — OFFICE VISIT (OUTPATIENT)
Dept: FAMILY MEDICINE | Facility: CLINIC | Age: 60
End: 2020-10-15
Payer: COMMERCIAL

## 2020-10-15 VITALS
OXYGEN SATURATION: 100 % | DIASTOLIC BLOOD PRESSURE: 85 MMHG | HEIGHT: 69 IN | SYSTOLIC BLOOD PRESSURE: 150 MMHG | TEMPERATURE: 96 F | WEIGHT: 186 LBS | HEART RATE: 61 BPM | BODY MASS INDEX: 27.55 KG/M2

## 2020-10-15 DIAGNOSIS — R71.0 DECREASED HEMOGLOBIN: ICD-10-CM

## 2020-10-15 DIAGNOSIS — E78.5 HYPERLIPIDEMIA LDL GOAL <130: ICD-10-CM

## 2020-10-15 DIAGNOSIS — R79.89 ELEVATED FERRITIN: ICD-10-CM

## 2020-10-15 DIAGNOSIS — Z23 NEED FOR VACCINATION: ICD-10-CM

## 2020-10-15 DIAGNOSIS — I10 ESSENTIAL HYPERTENSION WITH GOAL BLOOD PRESSURE LESS THAN 140/90: ICD-10-CM

## 2020-10-15 DIAGNOSIS — K85.20 ALCOHOL-INDUCED ACUTE PANCREATITIS WITHOUT INFECTION OR NECROSIS: Primary | ICD-10-CM

## 2020-10-15 DIAGNOSIS — M79.89 SWELLING OF RIGHT LOWER EXTREMITY: ICD-10-CM

## 2020-10-15 DIAGNOSIS — F10.20 UNCOMPLICATED ALCOHOL DEPENDENCE (H): ICD-10-CM

## 2020-10-15 DIAGNOSIS — R79.89 ELEVATED LFTS: ICD-10-CM

## 2020-10-15 LAB
BASOPHILS # BLD AUTO: 0 10E9/L (ref 0–0.2)
BASOPHILS NFR BLD AUTO: 0.8 %
DIFFERENTIAL METHOD BLD: ABNORMAL
EOSINOPHIL # BLD AUTO: 0.2 10E9/L (ref 0–0.7)
EOSINOPHIL NFR BLD AUTO: 2.9 %
ERYTHROCYTE [DISTWIDTH] IN BLOOD BY AUTOMATED COUNT: 13.9 % (ref 10–15)
HCT VFR BLD AUTO: 32.4 % (ref 40–53)
HGB BLD-MCNC: 10.9 G/DL (ref 13.3–17.7)
LYMPHOCYTES # BLD AUTO: 2 10E9/L (ref 0.8–5.3)
LYMPHOCYTES NFR BLD AUTO: 38.5 %
MCH RBC QN AUTO: 35.9 PG (ref 26.5–33)
MCHC RBC AUTO-ENTMCNC: 33.6 G/DL (ref 31.5–36.5)
MCV RBC AUTO: 107 FL (ref 78–100)
MONOCYTES # BLD AUTO: 0.5 10E9/L (ref 0–1.3)
MONOCYTES NFR BLD AUTO: 9.9 %
NEUTROPHILS # BLD AUTO: 2.5 10E9/L (ref 1.6–8.3)
NEUTROPHILS NFR BLD AUTO: 47.9 %
PLATELET # BLD AUTO: 275 10E9/L (ref 150–450)
RBC # BLD AUTO: 3.04 10E12/L (ref 4.4–5.9)
WBC # BLD AUTO: 5.2 10E9/L (ref 4–11)

## 2020-10-15 PROCEDURE — 86803 HEPATITIS C AB TEST: CPT | Performed by: FAMILY MEDICINE

## 2020-10-15 PROCEDURE — 87340 HEPATITIS B SURFACE AG IA: CPT | Performed by: FAMILY MEDICINE

## 2020-10-15 PROCEDURE — 83540 ASSAY OF IRON: CPT | Performed by: FAMILY MEDICINE

## 2020-10-15 PROCEDURE — 85060 BLOOD SMEAR INTERPRETATION: CPT | Performed by: FAMILY MEDICINE

## 2020-10-15 PROCEDURE — 85045 AUTOMATED RETICULOCYTE COUNT: CPT | Performed by: FAMILY MEDICINE

## 2020-10-15 PROCEDURE — 36415 COLL VENOUS BLD VENIPUNCTURE: CPT | Performed by: FAMILY MEDICINE

## 2020-10-15 PROCEDURE — 82607 VITAMIN B-12: CPT | Performed by: FAMILY MEDICINE

## 2020-10-15 PROCEDURE — 83550 IRON BINDING TEST: CPT | Performed by: FAMILY MEDICINE

## 2020-10-15 PROCEDURE — 99214 OFFICE O/P EST MOD 30 MIN: CPT | Performed by: FAMILY MEDICINE

## 2020-10-15 PROCEDURE — 82746 ASSAY OF FOLIC ACID SERUM: CPT | Performed by: FAMILY MEDICINE

## 2020-10-15 PROCEDURE — 85610 PROTHROMBIN TIME: CPT | Performed by: FAMILY MEDICINE

## 2020-10-15 PROCEDURE — 84238 ASSAY NONENDOCRINE RECEPTOR: CPT | Mod: 90 | Performed by: FAMILY MEDICINE

## 2020-10-15 PROCEDURE — 83690 ASSAY OF LIPASE: CPT | Performed by: FAMILY MEDICINE

## 2020-10-15 PROCEDURE — 82103 ALPHA-1-ANTITRYPSIN TOTAL: CPT | Mod: 90 | Performed by: FAMILY MEDICINE

## 2020-10-15 PROCEDURE — 80061 LIPID PANEL: CPT | Performed by: FAMILY MEDICINE

## 2020-10-15 PROCEDURE — 82390 ASSAY OF CERULOPLASMIN: CPT | Performed by: FAMILY MEDICINE

## 2020-10-15 PROCEDURE — 80048 BASIC METABOLIC PNL TOTAL CA: CPT | Performed by: FAMILY MEDICINE

## 2020-10-15 PROCEDURE — 99000 SPECIMEN HANDLING OFFICE-LAB: CPT | Performed by: FAMILY MEDICINE

## 2020-10-15 PROCEDURE — 83516 IMMUNOASSAY NONANTIBODY: CPT | Mod: 90 | Performed by: FAMILY MEDICINE

## 2020-10-15 PROCEDURE — 82150 ASSAY OF AMYLASE: CPT | Performed by: FAMILY MEDICINE

## 2020-10-15 PROCEDURE — 83921 ORGANIC ACID SINGLE QUANT: CPT | Performed by: FAMILY MEDICINE

## 2020-10-15 PROCEDURE — 85025 COMPLETE CBC W/AUTO DIFF WBC: CPT | Performed by: FAMILY MEDICINE

## 2020-10-15 PROCEDURE — 80076 HEPATIC FUNCTION PANEL: CPT | Performed by: FAMILY MEDICINE

## 2020-10-15 RX ORDER — SIMVASTATIN 40 MG
40 TABLET ORAL EVERY EVENING
Qty: 90 TABLET | Refills: 1 | Status: SHIPPED | OUTPATIENT
Start: 2020-10-15 | End: 2022-01-15

## 2020-10-15 RX ORDER — LISINOPRIL AND HYDROCHLOROTHIAZIDE 20; 25 MG/1; MG/1
1 TABLET ORAL DAILY
Qty: 90 TABLET | Refills: 1 | Status: SHIPPED | OUTPATIENT
Start: 2020-10-15 | End: 2021-10-13

## 2020-10-15 ASSESSMENT — PAIN SCALES - GENERAL: PAINLEVEL: NO PAIN (0)

## 2020-10-15 ASSESSMENT — PATIENT HEALTH QUESTIONNAIRE - PHQ9
5. POOR APPETITE OR OVEREATING: NOT AT ALL
SUM OF ALL RESPONSES TO PHQ QUESTIONS 1-9: 4

## 2020-10-15 ASSESSMENT — ANXIETY QUESTIONNAIRES
5. BEING SO RESTLESS THAT IT IS HARD TO SIT STILL: NOT AT ALL
6. BECOMING EASILY ANNOYED OR IRRITABLE: SEVERAL DAYS
3. WORRYING TOO MUCH ABOUT DIFFERENT THINGS: NOT AT ALL
GAD7 TOTAL SCORE: 1
1. FEELING NERVOUS, ANXIOUS, OR ON EDGE: NOT AT ALL
7. FEELING AFRAID AS IF SOMETHING AWFUL MIGHT HAPPEN: NOT AT ALL
2. NOT BEING ABLE TO STOP OR CONTROL WORRYING: NOT AT ALL
IF YOU CHECKED OFF ANY PROBLEMS ON THIS QUESTIONNAIRE, HOW DIFFICULT HAVE THESE PROBLEMS MADE IT FOR YOU TO DO YOUR WORK, TAKE CARE OF THINGS AT HOME, OR GET ALONG WITH OTHER PEOPLE: NOT DIFFICULT AT ALL

## 2020-10-15 ASSESSMENT — MIFFLIN-ST. JEOR: SCORE: 1649.07

## 2020-10-15 NOTE — PROGRESS NOTES
"Subjective     Ed Rudolph is a 60 year old male who presents to clinic today for the following health issues:    HPI         ED/UC Followup:    Facility:  [facility]  Date of visit: 10/2  Reason for visit: Abdominal pain (pancreatitis)  Current Status: feeling better           Abdominal Pain      Duration: onset 9/30/20    Description (location/character/radiation): epigastric       Associated flank pain: None    Intensity:  4-5/10 now (was 7-8)    Accompanying signs and symptoms:        Fever/Chills: no        Gas/Bloating: YES       Nausea/vomitting: YES       Diarrhea: no        Dysuria or Hematuria: no     History (previous similar pain/trauma/previous testing): no    Precipitating or alleviating factors:       Pain worse with eating/BM/urination: eating       Pain relieved by BM: no     Therapies tried and outcome: since his hospital visit for alcoholic pancreatitis, he has cut alcohol consumption by at least 50% since, was straight, and diluting it with a juice/punch    LMP:  not applicable      Past medical, family, and social histories, medications, and allergies are reviewed and updated in [facility].     Review of Systems   CONSTITUTIONAL: NEGATIVE for fever, chills, change in weight  ENT/MOUTH: NEGATIVE for ear, mouth and throat problems  RESP: NEGATIVE for significant cough or SOB   CV: NEGATIVE for chest pain/chest pressure, claudication, dyspnea on exertion and irregular heart beat, but  RT lower extremity edema since about 10/12/20    OBJECTIVE:   BP (!) 150/85 (BP Location: Left arm, Patient Position: Chair, Cuff Size: Adult Regular)   Pulse 61   Temp 96  F (35.6  C) (Tympanic)   Ht 1.753 m (5' 9\")   Wt 84.4 kg (186 lb)   SpO2 100%   BMI 27.47 kg/m    Body mass index is 27.47 kg/m .  Physical Exam   GENERAL: healthy, alert and no distress  EYES: Eyes grossly normal to inspection, PERRL, EOMI, sclerae white and conjunctivae normal  RESP: lungs clear to auscultation - no crackles or " wheezes, no areas of dullness, no tachypnea  CV: Heart regular rate and rhythm without murmur, click or rub. No peripheral edema and peripheral pulses strong  MS: no gross musculoskeletal defects noted, RLE 1+ pitting edema  SKIN: no suspicious lesions or rashes to visible skin  NEURO: Normal strength and tone, sensory exam grossly normal, mentation intact, oriented times 3 and cranial nerves 2-12 intact  PSYCH: mentation appears normal, affect normal/bright       Diagnostic Test Results:             ASSESSMENT / PLAN:    (K85.20) Alcohol-induced acute pancreatitis without infection or necrosis  (primary encounter diagnosis)  Comment: resolving per patient's leel of symptoms  Plan: Hepatic panel, Lipase, Amylase          (F10.20) Uncomplicated alcohol dependence (H)  Comment:   Plan: Vitamin B12, Folate, Methylmalonic Acid,         Hepatic panel          (R79.89) Elevated LFTs  Comment: Labs to screen for other causes. I would like a liver specialist to clarify with patient the cause and extent of disease. Alcohol is probably the major factor  Plan: CBC with platelets differential, Hepatic panel,        Alpha-1-antitrypsin total, Ceruloplasmin, F         Actin EIA with reflex, Hepatitis B surface         antigen, Hepatitis C Screen Reflex to HCV RNA         Quant and Genotype, INR, JUST IN CASE,         GASTROENTEROLOGY ADULT REF CONSULT ONLY            (R79.89) Elevated ferritin  Comment: probably associated with liver disease since his Hgb is low  Plan: CBC with platelets differential,         GASTROENTEROLOGY ADULT REF CONSULT ONLY            (R71.0) Decreased hemoglobin  Comment: dietary vs alcohol-induced vs anemia of chronic disease  Plan: Iron and iron binding capacity, Vitamin B12,         Folate, Soluble transferrin receptor, Blood         Morphology Pathologist Review, CBC with         platelets differential, Reticulocyte Count,         Methylmalonic Acid            (M79.89) Swelling of right lower  extremity  Comment: r/o DVT  Plan: US Lower Extremity Venous Duplex Right        Return in about 3 weeks (around 11/5/2020) for blood pressure check, lab tests, or sooner if symptoms worsen.      (I10) Essential hypertension with goal blood pressure less than 140/90  Comment: uncontrolled  Plan: Basic metabolic panel,         lisinopril-hydrochlorothiazide (ZESTORETIC)         20-25 MG tablet        Return in about 3 weeks (around 11/5/2020) for blood pressure check, lab tests, or sooner if symptoms worsen.      (E78.5) Hyperlipidemia LDL goal <130  Comment:   Plan: simvastatin (ZOCOR) 40 MG tablet, Lipid panel         reflex to direct LDL Non-fasting                 Patient left before vaccines were given      Aris Garcia MD

## 2020-10-15 NOTE — PATIENT INSTRUCTIONS
Please call [facility]: [phone] to schedule your leg vein ultrasound.     You should receive your second (final) shingles vaccine between 12/15/20 & 4/15/21.

## 2020-10-15 NOTE — Clinical Note
Patient left before receiving shingles and influenza vaccines, but they were documented as being given, and I can;t delete it. He is here today and will get them then I may have to reorder them for the MA to administer them.

## 2020-10-15 NOTE — NURSING NOTE
Prior to immunization administration, verified patients identity using patient s name and date of birth. Please see Immunization Activity for additional information.     Screening Questionnaire for Adult Immunization    Are you sick today?   No   Do you have allergies to medications, food, a vaccine component or latex?   No   Have you ever had a serious reaction after receiving a vaccination?   No   Do you have a long-term health problem with heart, lung, kidney, or metabolic disease (e.g., diabetes), asthma, a blood disorder, no spleen, complement component deficiency, a cochlear implant, or a spinal fluid leak?  Are you on long-term aspirin therapy?   No   Do you have cancer, leukemia, HIV/AIDS, or any other immune system problem?   No   Do you have a parent, brother, or sister with an immune system problem?   No   In the past 3 months, have you taken medications that affect  your immune system, such as prednisone, other steroids, or anticancer drugs; drugs for the treatment of rheumatoid arthritis, Crohn s disease, or psoriasis; or have you had radiation treatments?   No   Have you had a seizure, or a brain or other nervous system problem?   No   During the past year, have you received a transfusion of blood or blood    products, or been given immune (gamma) globulin or antiviral drug?   No   For women: Are you pregnant or is there a chance you could become       pregnant during the next month?   No   Have you received any vaccinations in the past 4 weeks?   No     Immunization questionnaire answers were all negative.        Per orders of Dr. Garcia, injection of Shingrix and flu shot given by Gatito Mcintosh. Patient instructed to remain in clinic for 15 minutes afterwards, and to report any adverse reaction to me immediately.       Screening performed by Gatito Mcintosh on 10/15/2020

## 2020-10-16 LAB
ALBUMIN SERPL-MCNC: 3.8 G/DL (ref 3.4–5)
ALP SERPL-CCNC: 72 U/L (ref 40–150)
ALT SERPL W P-5'-P-CCNC: 49 U/L (ref 0–70)
AMYLASE SERPL-CCNC: 128 U/L (ref 30–110)
ANION GAP SERPL CALCULATED.3IONS-SCNC: 5 MMOL/L (ref 3–14)
AST SERPL W P-5'-P-CCNC: 41 U/L (ref 0–45)
BILIRUB DIRECT SERPL-MCNC: 0.1 MG/DL (ref 0–0.2)
BILIRUB SERPL-MCNC: 0.3 MG/DL (ref 0.2–1.3)
BUN SERPL-MCNC: 18 MG/DL (ref 7–30)
CALCIUM SERPL-MCNC: 8.9 MG/DL (ref 8.5–10.1)
CHLORIDE SERPL-SCNC: 104 MMOL/L (ref 94–109)
CHOLEST SERPL-MCNC: 167 MG/DL
CO2 SERPL-SCNC: 28 MMOL/L (ref 20–32)
CREAT SERPL-MCNC: 0.9 MG/DL (ref 0.66–1.25)
FOLATE SERPL-MCNC: 6.5 NG/ML
GFR SERPL CREATININE-BSD FRML MDRD: >90 ML/MIN/{1.73_M2}
GLUCOSE SERPL-MCNC: 79 MG/DL (ref 70–99)
HBV SURFACE AG SERPL QL IA: NONREACTIVE
HCV AB SERPL QL IA: NONREACTIVE
HDLC SERPL-MCNC: 67 MG/DL
INR PPP: 0.95 (ref 0.86–1.14)
IRON SATN MFR SERPL: 56 % (ref 15–46)
IRON SERPL-MCNC: 177 UG/DL (ref 35–180)
LDLC SERPL CALC-MCNC: 74 MG/DL
LIPASE SERPL-CCNC: 131 U/L (ref 73–393)
NONHDLC SERPL-MCNC: 100 MG/DL
POTASSIUM SERPL-SCNC: 3.6 MMOL/L (ref 3.4–5.3)
PROT SERPL-MCNC: 7.5 G/DL (ref 6.8–8.8)
RETICS # AUTO: 86.5 10E9/L (ref 25–95)
RETICS/RBC NFR AUTO: 2.8 % (ref 0.5–2)
SODIUM SERPL-SCNC: 137 MMOL/L (ref 133–144)
TIBC SERPL-MCNC: 317 UG/DL (ref 240–430)
TRIGL SERPL-MCNC: 131 MG/DL
VIT B12 SERPL-MCNC: 456 PG/ML (ref 193–986)

## 2020-10-16 ASSESSMENT — ANXIETY QUESTIONNAIRES: GAD7 TOTAL SCORE: 1

## 2020-10-17 LAB — STFR SERPL-SCNC: 2.1 MG/L (ref 2.2–5)

## 2020-10-18 LAB — SMA IGG SER-ACNC: 4 UNITS (ref 0–19)

## 2020-10-19 ENCOUNTER — TELEPHONE (OUTPATIENT)
Dept: GASTROENTEROLOGY | Facility: CLINIC | Age: 60
End: 2020-10-19

## 2020-10-19 LAB
A1AT SERPL-MCNC: 123 MG/DL (ref 90–200)
CERULOPLASMIN SERPL-MCNC: 27 MG/DL (ref 20–60)
COPATH REPORT: NORMAL

## 2020-10-19 NOTE — TELEPHONE ENCOUNTER
Left voicemail letting patient know labs not needed.  Recent labs from 1015/20 are sufficient enough.    [facility]    Phone Message    May a detailed message be left on voicemail: yes     Reason for Call: Order(s): Other:   Reason for requested: Pt is requesting for their labs to be sent to the  [facility].  Date needed: asap  Provider name: Dr. Sharma      Action Taken: Message routed to:  [facility] ([facility] hep    Travel Screening: Not Applicable

## 2020-10-20 NOTE — TELEPHONE ENCOUNTER
RECORDS RECEIVED FROM: Internal   Appt Date: 10.23.2020   NOTES STATUS DETAILS   OFFICE NOTE from referring provider Internal 10.15.2020 Consult Aris Garcia MD   OFFICE NOTES from other specialists Internal 03.05.2020 Aris Garcia MD   DISCHARGE SUMMARY from hospital N/A    MEDICATION LIST Internal    LIVER BIOSPY (IF APPLICABLE)      PATHOLOGY REPORTS  N/A    IMAGING     ENDOSCOPY (IF AVAILABLE) N/A    COLONOSCOPY (IF AVAILABLE) Internal 0412.2018   ULTRASOUND LIVER Care Everywhere 10.02.2020 CT Abd Pelvis   CT OF ABDOMEN Care Everywhere 10.02.2020 US ABDOMEN UPPER     MRI OF LIVER N/A    FIBROSCAN, US ELASTOGRAPHY, FIBROSIS SCAN, MR ELASTOGRAPHY N/A    LABS     HEPATIC PANEL (LIVER PANEL) Internal 10.15.2020   BASIC METABOLIC PANEL Internal 10.15.2020   COMPLETE METABOLIC PANEL Internal 10.15.2020   COMPLETE BLOOD COUNT (CBC) Internal 10.15.2020   INTERNATIONAL NORMALIZED RATIO (INR) Internal 10.15.2020   HEPATITIS C ANTIBODY N/A    HEPATITIS C VIRAL LOAD/PCR N/A    HEPATITIS C GENOTYPE N/A    HEPATITIS B SURFACE ANTIGEN Internal 10.15.2020   HEPATITIS B SURFACE ANTIBODY N/A    HEPATITIS B DNA QUANT LEVEL N/A    HEPATITIS B CORE ANTIBODY N/A      Action 10.20.2020 RM   Action Taken Called [facility] to get images pushed over, called [phone] spoke to a rep who will be pushing over the images. Both images received and uploaded to the pt chart.

## 2020-10-21 ENCOUNTER — ANCILLARY PROCEDURE (OUTPATIENT)
Dept: ULTRASOUND IMAGING | Facility: CLINIC | Age: 60
End: 2020-10-21
Attending: FAMILY MEDICINE
Payer: COMMERCIAL

## 2020-10-21 DIAGNOSIS — M79.89 SWELLING OF RIGHT LOWER EXTREMITY: ICD-10-CM

## 2020-10-22 LAB — METHYLMALONATE SERPL-SCNC: 0.14 UMOL/L (ref 0–0.4)

## 2020-10-22 NOTE — PROGRESS NOTES
"Ed Rudolph is a 60 year old male who is being evaluated via a billable video visit.      The patient has been notified of following:   \"This video visit will be conducted via a call between you and your physician/provider. We have found that certain health care needs can be provided without the need for an in-person physical exam.  This service lets us provide the care you need with a video conversation.  If a prescription is necessary we can send it directly to your pharmacy.  If lab work is needed we can place an order for that and you can then stop by our lab to have the test done at a later time. Video visits are billed at different rates depending on your insurance coverage.  Please reach out to your insurance provider with any questions.  If during the course of the call the physician/provider feels a video visit is not appropriate, you will not be charged for this service.\"   Patient has given verbal consent for Video visit? Yes    Type of service:  Video Visit  Video Start Time: 8:05  Video End Time 8:25  Patient location: in his car at work  Will anyone else be joining your video visit? no  {If patient encounters technical issues they should call [phone] :8  Distant Location (provider location):  [facility] [place]   Mode of Communication:  Video Conference via [employer]  I have reviewed and updated the patient's Past Medical History, Social History, Family History and Medication List.    REASON FOR CONSULTATION: elevated liver tests  REFERRING PROVIDER: Aris Garcia    A/P  Ed Rudolph is a 60 year old male with intermittently elevated transaminases and iron tests and abdominal pain in the setting of chronicheavy alcohol use.    AUD This is the predominant threat to his health. He is agreeable to meet with health counselor/psychologist/therapist/psychiatrist. I have placed referral.    Abdominal pain US and CT without acute process. Improved. No ascites. May " "have had element of mild pancreatitis although lipase and CT not that remarkable.    Elevated transaminases Due to heavy alcohol use, may be component of NAFLD as well as statin use. Statin use is acceptable. If/how much fibrosis he has is not clear. I would wait until he has period of attempted sobriety and reassess for fibrosis with US elastography iin the future (4-6 months). He does not have overt evidence of portal hypertension or cirrhosis but lack of findings on testing done so far does not rule it out.    HLD Ok to stay on statin    Elevated iron Due to heavy alcohol use and fatty liver (ETOH-related fatty liver +/-nonalcoholic fatty liver.) HFE negative 2013    Anemia, mild, macrocytic Folate and B12 normal. Due to alcohol use    NALFD risk factors HLN, HTN, impaired fasting glucose    RTC 3 months with labs (ordered)    Radha Sharma MD  Hepatology/Liver Transplant  Medical Director, Liver Transplantation  [facility]  Subjective  Ed Rudolph is a 60 year old male referred for elevated liver tests in the setting of heavy alcohol use.    Was seen in  10/2/20 with right lower abdominal pain that he rated 7/10 and sent to ED for this.  ALT 95 TB 0.6  Lipase 536 Hgb 11.6  Plt 196.    He said today his pain is about 4/10 and was 7/10. He is feeling better but is concerned about his health.     He complains of RLE swelling: US was negative for clot 10/21/20.He will have visit with Dr. Garcia today. He is continuing to work. No difficulty with eating, moving bowels.     AUD  Was drinking a fifth (750 ml) of vodka per day and now drinking 1 pint (450 ml) per day.  States he doesn't know if he could or can quit. \"I have been drinking for so long, 40 years possibly.\"  When asked if he wants to quit, he said he would because it would be cheaper in his budget and he is concerned about his health. States he is looking for a warning sign to scare him.     10/2/20  1.  No acute " ultrasound findings are seen in the upper abdomen, as detailed above.  2.  No gallstones, gallbladder wall thickening, pericholecystic fluid, sonographic Ba sign, or bile duct dilatation.  3.  Unremarkable sonographic appearance of the liver, kidneys, and visualized portions of the pancreas.  4.  Unable to visualize the spleen due to body habitus.    CT 10/2/20  1. Small fat stranding is noted near the splenic flexure between the pancreatic tail and descending colon with no evident parenchymal abnormality of either structure. Findings may represent pancreatitis or possibly mild colitis, the latter is favored.  2. No additional acute inflammatory process is demonstrated within the abdomen or pelvis.  3. Mild chronic appearing anterior L1 superior endplate compression deformity is new since 2015.  4. Diffuse hepatic steatosis.  5. Bilateral subcentimeter renal cortical hypodensities, too small to characterize, presumably cysts.    Iron 110-210  Iron sat  29-59. Current 56  Ferritin 103-1310. Current 475  HFE 2013 negative  GABRIELA neg  Intermittent mild elevations in transaminases over last 8 years. Saw Dr. Keller in hematology 4/2/19 for elevated ferritin and he recommended he stop alcohol  Lab Test 10/15/20  1834   PROTTOTAL 7.5   ALBUMIN 3.8   BILITOTAL 0.3   ALKPHOS 72   AST 41   ALT 49     Lab Test 10/15/20  1834   WBC 5.2   RBC 3.04*   HGB 10.9*   HCT 32.4*   *   MCH 35.9*   MCHC 33.6   RDW 13.9        HCV neg  HBV neg  GABRIELA neg  Colonoscopy 2018 diverticulosis, polyps  Risk factors for fatty liver disease: HLD HTN, impaired fasting glusoce      Past Medical History:   Diagnosis Date     Adjustment disorder with depressed mood 1/12/2016     Carpal tunnel syndrome 1/5/2007     Chest wall contusion 12/2/2015     Depression with suicidal ideation 12/21/2015     Erectile Dysfunction 1/5/2007     Hyperlipidemia LDL goal <130 10/31/2010     Hypertension goal BP (blood pressure) < 140/90 5/11/2011      Impaired fasting glucose      LEFT FRONTAL LOBE ABSCESS 1/03    treated surgically, resolved     LFT elevation 5/30/2012     SHX Single. ETOH hx above. Working as a .    Family History   Adopted: Yes   Problem Relation Age of Onset     Family History Negative No family hx of         Unknown, adopted     ROS 10 point ROS neg other than the symptoms noted above in the HPI.    Exam  Gen Alert pleasant NAD  Resp No difficulty breathing. No cough  Skin No Jaundice  Eyes No icterus  Mouth missing teeth  Neuro SALEEM  MSK no muscle wasting  Psyche Pleasant, appropriate. Well groomed.

## 2020-10-23 ENCOUNTER — PRE VISIT (OUTPATIENT)
Dept: GASTROENTEROLOGY | Facility: CLINIC | Age: 60
End: 2020-10-23

## 2020-10-23 ENCOUNTER — VIRTUAL VISIT (OUTPATIENT)
Dept: GASTROENTEROLOGY | Facility: CLINIC | Age: 60
End: 2020-10-23
Attending: INTERNAL MEDICINE
Payer: COMMERCIAL

## 2020-10-23 ENCOUNTER — OFFICE VISIT (OUTPATIENT)
Dept: FAMILY MEDICINE | Facility: CLINIC | Age: 60
End: 2020-10-23
Payer: COMMERCIAL

## 2020-10-23 VITALS
SYSTOLIC BLOOD PRESSURE: 124 MMHG | BODY MASS INDEX: 27.76 KG/M2 | HEART RATE: 78 BPM | TEMPERATURE: 97.8 F | WEIGHT: 188 LBS | DIASTOLIC BLOOD PRESSURE: 68 MMHG | OXYGEN SATURATION: 95 % | RESPIRATION RATE: 16 BRPM

## 2020-10-23 DIAGNOSIS — R10.31 ABDOMINAL PAIN, RIGHT LOWER QUADRANT: ICD-10-CM

## 2020-10-23 DIAGNOSIS — R74.8 ELEVATED LIVER ENZYMES: ICD-10-CM

## 2020-10-23 DIAGNOSIS — D53.9 MACROCYTIC ANEMIA: ICD-10-CM

## 2020-10-23 DIAGNOSIS — R79.89 ELEVATED FERRITIN: ICD-10-CM

## 2020-10-23 DIAGNOSIS — Z23 NEED FOR VACCINATION: ICD-10-CM

## 2020-10-23 DIAGNOSIS — I87.2 VENOUS INCOMPETENCE: ICD-10-CM

## 2020-10-23 DIAGNOSIS — E78.5 HYPERLIPIDEMIA LDL GOAL <100: ICD-10-CM

## 2020-10-23 DIAGNOSIS — M79.89 SWELLING OF RIGHT LOWER EXTREMITY: Primary | ICD-10-CM

## 2020-10-23 DIAGNOSIS — F10.20 ALCOHOL USE DISORDER, SEVERE, DEPENDENCE (H): Primary | ICD-10-CM

## 2020-10-23 DIAGNOSIS — F33.0 MAJOR DEPRESSIVE DISORDER, RECURRENT EPISODE, MILD (H): ICD-10-CM

## 2020-10-23 DIAGNOSIS — F51.01 PRIMARY INSOMNIA: ICD-10-CM

## 2020-10-23 DIAGNOSIS — R73.01 IMPAIRED FASTING GLUCOSE: ICD-10-CM

## 2020-10-23 PROCEDURE — 90471 IMMUNIZATION ADMIN: CPT | Performed by: FAMILY MEDICINE

## 2020-10-23 PROCEDURE — 90472 IMMUNIZATION ADMIN EACH ADD: CPT | Performed by: FAMILY MEDICINE

## 2020-10-23 PROCEDURE — 99213 OFFICE O/P EST LOW 20 MIN: CPT | Mod: 25 | Performed by: FAMILY MEDICINE

## 2020-10-23 PROCEDURE — 90750 HZV VACC RECOMBINANT IM: CPT | Performed by: FAMILY MEDICINE

## 2020-10-23 PROCEDURE — 99215 OFFICE O/P EST HI 40 MIN: CPT | Mod: 95 | Performed by: INTERNAL MEDICINE

## 2020-10-23 PROCEDURE — 90682 RIV4 VACC RECOMBINANT DNA IM: CPT | Performed by: FAMILY MEDICINE

## 2020-10-23 RX ORDER — TRAZODONE HYDROCHLORIDE 50 MG/1
50 TABLET, FILM COATED ORAL AT BEDTIME
Qty: 90 TABLET | Refills: 1 | Status: SHIPPED | OUTPATIENT
Start: 2020-10-23 | End: 2021-06-10

## 2020-10-23 ASSESSMENT — PAIN SCALES - GENERAL
PAINLEVEL: MODERATE PAIN (4)
PAINLEVEL: MODERATE PAIN (4)

## 2020-10-23 NOTE — NURSING NOTE
Prior to immunization administration, verified patients identity using patient s name and date of birth. Please see Immunization Activity for additional information.     Screening Questionnaire for Adult Immunization    Are you sick today?   No   Do you have allergies to medications, food, a vaccine component or latex?   No   Have you ever had a serious reaction after receiving a vaccination?   No   Do you have a long-term health problem with heart, lung, kidney, or metabolic disease (e.g., diabetes), asthma, a blood disorder, no spleen, complement component deficiency, a cochlear implant, or a spinal fluid leak?  Are you on long-term aspirin therapy?   No   Do you have cancer, leukemia, HIV/AIDS, or any other immune system problem?   No   Do you have a parent, brother, or sister with an immune system problem?   No   In the past 3 months, have you taken medications that affect  your immune system, such as prednisone, other steroids, or anticancer drugs; drugs for the treatment of rheumatoid arthritis, Crohn s disease, or psoriasis; or have you had radiation treatments?   No   Have you had a seizure, or a brain or other nervous system problem?   No   During the past year, have you received a transfusion of blood or blood    products, or been given immune (gamma) globulin or antiviral drug?   No   For women: Are you pregnant or is there a chance you could become       pregnant during the next month?   No   Have you received any vaccinations in the past 4 weeks?   No     Immunization questionnaire answers were all negative.        Per orders of Dr. Garcia, injection of flu shot and Shingrix given by Gatito Mcintosh. Patient instructed to remain in clinic for 15 minutes afterwards, and to report any adverse reaction to me immediately.       Screening performed by Gatito Mcintosh on 10/23/2020

## 2020-10-23 NOTE — PATIENT INSTRUCTIONS
At [facility], we strive to deliver an exceptional experience to you, every time we see you. If you receive a survey, please complete it as we do value your feedback.  If you have MyChart, you can expect to receive results automatically within 24 hours of their completion.  Your provider will send a note interpreting your results as well.   If you do not have MyChart, you should receive your results in about a week by mail.    Your care team:                            Family Medicine Internal Medicine   MD Marquez Dunaway, MD Shea Burrell, MD Kell Loya PA-C, APRN CNP    Cachorro Darnell, MD Pediatrics   Nicolas Vallejo, PADinoC  Abbi Rosa, CNP MD Raisa Sifuentes APRN CNP   MD Pau Silva MD Deborah Mielke, MD Jaye Blanco, APRN CNP  Denia Tran PAHERMES Michael, CNP  MD Carol Madden MD Angela Wermerskirchen, MD      Clinic hours: Monday - Thursday 7 am-7 pm; Fridays 7 am-5 pm.   Urgent care: Monday - Friday 11 am-9 pm; Saturday and Sunday 9 am-5 pm.    Clinic: [phone]       [facility]: Monday - Thursday 8 am - 7 pm; Friday 8 am - 6 pm  [facility]: [phone]     Use www.oncare.org for 24/7 diagnosis and treatment of dozens of conditions.    Patient Education   Coping with Edema  What is edema?  Edema is the build-up of fluid in the body, which causes swelling. Swelling most commonly occurs in the feet, ankles, lower legs or hands.  Swelling can occur in the belly or chest may be a sign of a more severe problem.  Certain medicines or conditions can make the swelling worse.  Symptoms include:    Feet and lower legs get larger when you sit or walk.    Hands feel tight when you make a fist.    When you push on the skin, skin stays dented.    Shiny, tight skin.    Fast weight gain.  How is it  treated?  Your care team may give you a medicine to reduce the swelling.  They may also suggest that you meet with a dietitian. He or she can help with food choices to reduce the swelling.  What can I do about the swelling?    Place your feet above your heart 3 times a day: Sit with your feet up on a stool with a pillow. Sit on the bed or couch with two pillows under your feet.    Do not stand for long periods of time.    Wear loose-fitting clothes.    Do not cross your legs.    Reduce the salt in your diet.   These foods are high in salt:  ? Chips, soup  ? Frozen meals, TV dinners  ? Joseph, lunch meat, ham  ? Sauces (soy, canned spaghetti sauce)    Walk or do other exercise.    Wear compression stockings.    Drink water as normal.    Weigh yourself every day at the same time to keep track of weight gain.  When should I call my care team?  Call your care team if:    You have a hard time breathing.    You gained 5 pounds or more in 1 week.    Your hands or feet feel cold when you touch them.    You are peeing very little or not at all.    Swelling is moving up your arms or legs.    Your tongue is swelling.    You cannot eat for more than a day  If you have any side effects, call us. We can help you manage these problems.  For more information, see:  www.[facility]oc[facility].com  www.cancer.org/treatment/treatmentsandsideeffects/physicalsideeffects/dealingwithsymptomsathome  https://www.cancer.gov/publications/patient-education/chemo-and-you  Comments:  __________________________________________  __________________________________________  __________________________________________  __________________________________________  __________________________________________  __________________________________________  __________________________________________  For informational purposes only. Not to replace the advice of your health care provider. Copyright   2014 [facility]. All rights reserved. Clinically reviewed by  Betty Lacey, Oncology Department. [employer] [id number] - REV 08/19.        Patient Education     Leg Swelling in a Single Leg  Swelling of the arms, feet, ankles, and legs is called edema. It is caused by extra fluid collecting in the tissues. Because of gravity, extra fluid in the body settles to the lowest part. That is why the legs and feet are most affected. You have swelling in a single leg.  Some of the causes for swelling in only a single leg include:    Infection in the foot or leg    Long-term problem with a vein not working well (venous insufficiency)    Swollen, twisted vein in the leg (varicose veins)    Insect bite or sting on the foot or leg    Injury or recent surgery on the foot or leg    Blood clot in a deep vein of the leg (deep vein thrombosis or DVT)    Inflammation of the joints of the lower leg  Medical treatment will depend on what is causing your swelling.  Home care  Follow these guidelines when caring for yourself at home:    Don t wear tight clothing.    Keep your legs up while lying or sitting.    Take any medicines as directed.    If infection, injury, or recent surgery is the cause of your swelling, stay off your legs as much as possible until your symptoms get better.    If you have venous insufficiency or varicose veins, don t sit or  one place for long periods of time. Take breaks and walk around every few hours. Talk with your healthcare provider about wearing support stockings to help lessen swelling during the day.    Wear compression stockings with your doctor's approval  Follow-up care  Follow up with your healthcare provider as advised.  Call 911  Call 911 if any of these occur:    Shortness of breath or trouble breathing    Chest pain    Coughing up blood    Fainting or loss of consciousness   When to seek medical advice  Call your healthcare provider right away if any of these occur:    Increased pain, swelling, warmth, or redness of the leg, ankle, or  foot    Fever of 100.4 F (38 C) or higher, or as directed by your healthcare provider    Weakness or dizziness    Shaking chills    Drenching sweats  Date Last Reviewed: 4/11/2016 2000-2019 [employer]. [address] [place] [address], [place] [address]. All rights reserved. This information is not intended as a substitute for professional medical care. Always follow your healthcare professional's instructions.           Patient Education     Understanding Leg Vein Problems  Leg veins carry blood from your feet and legs back to your heart. If a vein is damaged, it may cause problems in your legs.              A damaged vein  If heredity, an injury, or a blood clot weakens a vein, the wall near the valve begins to sag. A valve keeps blood moving through the vein towards the heart. The valve may no longer close fully, allowing blood to move backward.   A  ropy  vein  Once a vein is damaged, blood pressing against the sagging wall may cause the vein to look like a twisted rope. Eventually, the valve can t close. Blood may begin to pool or clot in the vein.   Pooling blood  A valve that doesn t close allows blood to move backwards and sit (pool) in the vein.   Clotting blood  Blood that is not moving may form clots. Over time, the clot may grow big enough to close off the vein.   The problems that may happen from damage to the veins include:     Varicose veins. This a swollen, twisted vein located close to the skin.    Deep vein thrombosis (DVT). This is a blood clot in one of the deep veins, usually of the legs. The clot can separate from the vein and travel to the lungs (pulmonary embolism or PE). In the lungs, the clot can cut off the flow of blood. These two conditions together are called venous thromboembolism (VTE).    Chronic venous insufficiency. This is a long-term problem with the veins not working well.  Your healthcare provider can give you more information on these conditions and how to prevent and  treat them.  When to call your healthcare provider  Call your healthcare provider right away if you have pain, swelling, or redness in one of your legs. These are the symptoms of a blood clot.  Call 911  Call 911 if you have:    Chest pain    Trouble breathing    Fast heartbeat    Sweating    Coughing (may cough up blood)    Fainting  These are the symptoms of a blood clot that has traveled to the lungs. This is a medical emergency and may cause death.   Date Last Reviewed: 5/1/2016 2000-2019 [employer]. [address] [place] [address], [place] [address]. All rights reserved. This information is not intended as a substitute for professional medical care. Always follow your healthcare professional's instructions.

## 2020-10-23 NOTE — PROGRESS NOTES
Subjective     Ed Rudolph is a 60 year old male who presents to clinic today for the following health issues:    HPI         Musculoskeletal problem/pain  Onset/Duration: ongoing   Description  Location: leg - right  Joint Swelling: YES  Redness: no  Pain: YES  Warmth: yes   Intensity:  4/10  Progression of Symptoms:  same  Accompanying signs and symptoms:   Fevers: no  Numbness/tingling/weakness: no  History  Trauma to the area: no  Recent illness:  no  Previous similar problem: no  Previous evaluation:  no  Precipitating or alleviating factors:  Aggravating factors include: standing   Therapies tried and outcome: nothing    -patient had his leg US on 10/21/2020        Past medical, family, and social histories, medications, and allergies are reviewed and updated in [facility].     Review of Systems   Constitutional, HEENT, cardiovascular, pulmonary, gi and gu systems are negative, except as otherwise noted.    OBJECTIVE:   /68 (BP Location: Left arm, Patient Position: Chair, Cuff Size: Adult Regular)   Pulse 78   Temp 97.8  F (36.6  C) (Tympanic)   Resp 16   Wt 85.3 kg (188 lb)   SpO2 95%   BMI 27.76 kg/m    Body mass index is 27.76 kg/m .  Physical Exam   GENERAL: healthy, alert and no distress  EYES: Eyes grossly normal to inspection, PERRL, EOMI, sclerae white and conjunctivae normal  MS: no gross musculoskeletal defects noted, no edema  SKIN: no suspicious lesions or rashes to visible skin  NEURO: Normal strength and tone, sensory exam grossly normal, mentation intact, oriented times 3 and cranial nerves 2-12 intact  PSYCH: mentation appears normal, affect normal/bright       Diagnostic Test Results:  US LOWER EXTREMITY VENOUS DUPLEX RIGHT   10/21/2020 2:44 PM      HISTORY: Right leg swelling.     COMPARISON: None.     TECHNIQUE: Spectral doppler and waveform analysis were performed on  the right lower extremity veins.     FINDINGS: The right common femoral, femoral, and popliteal veins are  patent,  compressible, and negative for deep venous thrombosis. Calf  veins are segmentally seen but appear negative for DVT where  visualized.                                                                      IMPRESSION:  No evidence for deep venous thrombosis in the right lower  extremity veins.    ASSESSMENT / PLAN:    ASSESSMENT/PLAN:  (M79.89) Swelling of right lower extremity  (primary encounter diagnosis)  (I87.2) Venous incompetence  Comment: DVT ruled out  Plan: Compression Sleeve/Stocking Order for DME -         ONLY FOR DME        Handout(s) provided, f/u prn     Return in about 6 months (around 4/15/2021) for full physical, cholesterol, blood pressure check.      Aris Garcia MD

## 2020-10-23 NOTE — PROGRESS NOTES
"Ed Rudolph is a 60 year old male who is being evaluated via a billable telephone visit.      The patient has been notified of following:     \"This telephone visit will be conducted via a call between you and your physician/provider. We have found that certain health care needs can be provided without the need for a physical exam.  This service lets us provide the care you need with a short phone conversation.  If a prescription is necessary we can send it directly to your pharmacy.  If lab work is needed we can place an order for that and you can then stop by our lab to have the test done at a later time.    Telephone visits are billed at different rates depending on your insurance coverage. During this emergency period, for some insurers they may be billed the same as an in-person visit.  Please reach out to your insurance provider with any questions.    If during the course of the call the physician/provider feels a telephone visit is not appropriate, you will not be charged for this service.\"    Patient has given verbal consent for Telephone visit?  Yes    What phone number would you like to be contacted at? [phone]    How would you like to obtain your AVS? Jonathan    Phone call duration: *** minutes    {signature options:[id number]}      "

## 2020-10-23 NOTE — LETTER
"    10/23/2020         RE: Ed Rudolph  [address]  [facility] [address]        Dear Colleague,    Thank you for referring your patient, Ed Rudolph, to the [facility]PATOLOGY [facility]. Please see a copy of my visit note below.    Ed Rudolph is a 60 year old male who is being evaluated via a billable video visit.      The patient has been notified of following:   \"This video visit will be conducted via a call between you and your physician/provider. We have found that certain health care needs can be provided without the need for an in-person physical exam.  This service lets us provide the care you need with a video conversation.  If a prescription is necessary we can send it directly to your pharmacy.  If lab work is needed we can place an order for that and you can then stop by our lab to have the test done at a later time. Video visits are billed at different rates depending on your insurance coverage.  Please reach out to your insurance provider with any questions.  If during the course of the call the physician/provider feels a video visit is not appropriate, you will not be charged for this service.\"   Patient has given verbal consent for Video visit? Yes    Type of service:  Video Visit  Video Start Time: 8:05  Video End Time 8:25  Patient location: in his car at work  Will anyone else be joining your video visit? no  {If patient encounters technical issues they should call [phone] :6  Distant Location (provider location):  [facility] HEPATOLOG[facility]   Mode of Communication:  Video Conference via [employer]  I have reviewed and updated the patient's Past Medical History, Social History, Family History and Medication List.    REASON FOR CONSULTATION: elevated liver tests  REFERRING PROVIDER: Aris Garcia    A/P  Ed Rudolph is a 60 year old male with intermittently elevated transaminases and iron tests and abdominal pain in " the setting of chronicheavy alcohol use.    AUD This is the predominant threat to his health. He is agreeable to meet with health counselor/psychologist/therapist/psychiatrist. I have placed referral.    Abdominal pain US and CT without acute process. Improved. No ascites. May have had element of mild pancreatitis although lipase and CT not that remarkable.    Elevated transaminases Due to heavy alcohol use, may be component of NAFLD as well as statin use. Statin use is acceptable. If/how much fibrosis he has is not clear. I would wait until he has period of attempted sobriety and reassess for fibrosis with US elastography iin the future (4-6 months). He does not have overt evidence of portal hypertension or cirrhosis but lack of findings on testing done so far does not rule it out.    HLD Ok to stay on statin    Elevated iron Due to heavy alcohol use and fatty liver (ETOH-related fatty liver +/-nonalcoholic fatty liver.) HFE negative 2013    Anemia, mild, macrocytic Folate and B12 normal. Due to alcohol use    NALFD risk factors HLN, HTN, impaired fasting glucose    RTC 3 months with labs (ordered)    Radha Sharma MD  Hepatology/Liver Transplant  Medical Director, Liver Transplantation  [facility]  Subjective  Ed Rudolph is a 60 year old male referred for elevated liver tests in the setting of heavy alcohol use.    Was seen in  10/2/20 with right lower abdominal pain that he rated 7/10 and sent to ED for this.  ALT 95 TB 0.6  Lipase 536 Hgb 11.6  Plt 196.    He said today his pain is about 4/10 and was 7/10. He is feeling better but is concerned about his health.     He complains of RLE swelling: US was negative for clot 10/21/20.He will have visit with Dr. Garcia today. He is continuing to work. No difficulty with eating, moving bowels.     AUD  Was drinking a fifth (750 ml) of vodka per day and now drinking 1 pint (450 ml) per day.  States he doesn't know if he could or can  "quit. \"I have been drinking for so long, 40 years possibly.\"  When asked if he wants to quit, he said he would because it would be cheaper in his budget and he is concerned about his health. States he is looking for a warning sign to scare him.    US 10/2/20  1.  No acute ultrasound findings are seen in the upper abdomen, as detailed above.  2.  No gallstones, gallbladder wall thickening, pericholecystic fluid, sonographic Ba sign, or bile duct dilatation.  3.  Unremarkable sonographic appearance of the liver, kidneys, and visualized portions of the pancreas.  4.  Unable to visualize the spleen due to body habitus.    CT 10/2/20  1. Small fat stranding is noted near the splenic flexure between the pancreatic tail and descending colon with no evident parenchymal abnormality of either structure. Findings may represent pancreatitis or possibly mild colitis, the latter is favored.  2. No additional acute inflammatory process is demonstrated within the abdomen or pelvis.  3. Mild chronic appearing anterior L1 superior endplate compression deformity is new since 2015.  4. Diffuse hepatic steatosis.  5. Bilateral subcentimeter renal cortical hypodensities, too small to characterize, presumably cysts.    Iron 110-210  Iron sat  29-59. Current 56  Ferritin 103-1310. Current 475  HFE 2013 negative  GABRIELA neg  Intermittent mild elevations in transaminases over last 8 years. Saw Dr. Keller in hematology 4/2/19 for elevated ferritin and he recommended he stop alcohol  Lab Test 10/15/20  1834   PROTTOTAL 7.5   ALBUMIN 3.8   BILITOTAL 0.3   ALKPHOS 72   AST 41   ALT 49     Lab Test 10/15/20  1834   WBC 5.2   RBC 3.04*   HGB 10.9*   HCT 32.4*   *   MCH 35.9*   MCHC 33.6   RDW 13.9        HCV neg  HBV neg  GABRIELA neg  Colonoscopy 2018 diverticulosis, polyps  Risk factors for fatty liver disease: HLD HTN, impaired fasting glusoce      Past Medical History:   Diagnosis Date     Adjustment disorder with depressed mood " 1/12/2016     Carpal tunnel syndrome 1/5/2007     Chest wall contusion 12/2/2015     Depression with suicidal ideation 12/21/2015     Erectile Dysfunction 1/5/2007     Hyperlipidemia LDL goal <130 10/31/2010     Hypertension goal BP (blood pressure) < 140/90 5/11/2011     Impaired fasting glucose      LEFT FRONTAL LOBE ABSCESS 1/03    treated surgically, resolved     LFT elevation 5/30/2012     SHX Single. ETOH hx above. Working as a .    Family History   Adopted: Yes   Problem Relation Age of Onset     Family History Negative No family hx of         Unknown, adopted     ROS 10 point ROS neg other than the symptoms noted above in the HPI.    Exam  Gen Alert pleasant NAD  Resp No difficulty breathing. No cough  Skin No Jaundice  Eyes No icterus  Mouth missing teeth  Neuro SALEEM  MSK no muscle wasting  Psyche Pleasant, appropriate. Well groomed.            Again, thank you for allowing me to participate in the care of your patient.        Sincerely,        Radha Sharma MD

## 2020-10-26 ENCOUNTER — TELEPHONE (OUTPATIENT)
Dept: GASTROENTEROLOGY | Facility: CLINIC | Age: 60
End: 2020-10-26

## 2020-10-28 ENCOUNTER — TELEPHONE (OUTPATIENT)
Dept: FAMILY MEDICINE | Facility: CLINIC | Age: 60
End: 2020-10-28

## 2020-10-28 NOTE — TELEPHONE ENCOUNTER
Reason for Call:  Other prescription    Detailed comments: Patient needs a new paper copy prescription for his compression sock as he threw the first one away accidentally     Phone Number Patient can be reached at: Cell number on file:    Telephone Information:   Mobile [phone]       Best Time: Anytime.    Can we leave a detailed message on this number? YES    Call taken on 10/28/2020 at 4:48 PM by Claritza Martins

## 2020-10-29 NOTE — TELEPHONE ENCOUNTER
10/23/20 DME rx for compression stockings placed at [facility]  for pt .  Pt notified RX at  after noon today via Voicemail.    MARGRET Leija.

## 2020-11-22 ENCOUNTER — HEALTH MAINTENANCE LETTER (OUTPATIENT)
Age: 60
End: 2020-11-22

## 2021-04-15 ENCOUNTER — IMMUNIZATION (OUTPATIENT)
Dept: NURSING | Facility: CLINIC | Age: 61
End: 2021-04-15
Payer: COMMERCIAL

## 2021-04-15 PROCEDURE — 91300 PR COVID VAC PFIZER DIL RECON 30 MCG/0.3 ML IM: CPT

## 2021-04-15 PROCEDURE — 0001A PR COVID VAC PFIZER DIL RECON 30 MCG/0.3 ML IM: CPT

## 2021-05-06 ENCOUNTER — IMMUNIZATION (OUTPATIENT)
Dept: NURSING | Facility: CLINIC | Age: 61
End: 2021-05-06
Attending: FAMILY MEDICINE
Payer: COMMERCIAL

## 2021-05-06 PROCEDURE — 0002A PR COVID VAC PFIZER DIL RECON 30 MCG/0.3 ML IM: CPT

## 2021-05-06 PROCEDURE — 91300 PR COVID VAC PFIZER DIL RECON 30 MCG/0.3 ML IM: CPT

## 2021-06-10 DIAGNOSIS — F33.0 MAJOR DEPRESSIVE DISORDER, RECURRENT EPISODE, MILD (H): ICD-10-CM

## 2021-06-10 DIAGNOSIS — F51.01 PRIMARY INSOMNIA: ICD-10-CM

## 2021-06-10 RX ORDER — TRAZODONE HYDROCHLORIDE 50 MG/1
TABLET, FILM COATED ORAL
Qty: 90 TABLET | Refills: 1 | Status: SHIPPED | OUTPATIENT
Start: 2021-06-10 | End: 2022-03-30

## 2021-07-21 ENCOUNTER — OFFICE VISIT (OUTPATIENT)
Dept: FAMILY MEDICINE | Facility: CLINIC | Age: 61
End: 2021-07-21
Payer: COMMERCIAL

## 2021-07-21 VITALS
DIASTOLIC BLOOD PRESSURE: 76 MMHG | OXYGEN SATURATION: 99 % | SYSTOLIC BLOOD PRESSURE: 125 MMHG | TEMPERATURE: 97.4 F | HEART RATE: 81 BPM | BODY MASS INDEX: 27.62 KG/M2 | WEIGHT: 187 LBS

## 2021-07-21 DIAGNOSIS — M79.661 PAIN OF RIGHT LOWER LEG: Primary | ICD-10-CM

## 2021-07-21 PROCEDURE — 99214 OFFICE O/P EST MOD 30 MIN: CPT | Performed by: FAMILY MEDICINE

## 2021-07-21 RX ORDER — PREDNISONE 20 MG/1
TABLET ORAL
Qty: 20 TABLET | Refills: 0 | Status: SHIPPED | OUTPATIENT
Start: 2021-07-21 | End: 2021-08-16

## 2021-07-21 ASSESSMENT — PATIENT HEALTH QUESTIONNAIRE - PHQ9: SUM OF ALL RESPONSES TO PHQ QUESTIONS 1-9: 3

## 2021-07-21 ASSESSMENT — PAIN SCALES - GENERAL: PAINLEVEL: EXTREME PAIN (9)

## 2021-07-21 NOTE — PATIENT INSTRUCTIONS
At [facility], we strive to deliver an exceptional experience to you, every time we see you. If you receive a survey, please complete it as we do value your feedback.  If you have MyChart, you can expect to receive results automatically within 24 hours of their completion.  Your provider will send a note interpreting your results as well.   If you do not have MyChart, you should receive your results in about a week by mail.    Your care team:                            Family Medicine Internal Medicine   MD Marquez Dunaway MD Shantel Branch-Fleming, MD Srinivasa Vaka, MD Katya Belousova, PAHERMES Spivey, APRN CNP    Cachorro Darnell, MD Pediatrics   Nicolas Vallejo, PAHERMES Rosa, CNP MD Raisa Sifuentes APRN CNP   MD Pau Silva MD Deborah Mielke, MD Jaye Blanco, APRN [facility]      Clinic hours: Monday - Thursday 7 am-6 pm; Fridays 7 am-5 pm.   Urgent care: Monday - Friday 10 am- 8 pm; Saturday and Sunday 9 am-5 pm.    Clinic: [phone]       [facility]: Monday - Thursday 8 am - 7 pm; Friday 8 am - 6 pm  [facility]: [phone]     Use [email or web address] for 24/7 diagnosis and treatment of dozens of conditions.

## 2021-07-21 NOTE — PROGRESS NOTES
"    Brian Ward is a 61 year old who presents for the following health issues:    HPI     Patient c/o right leg pain for the last 2-3 weeks. No known injuries. This has never happen before. Patient feels a \"burning sensation\" lateral leg down to toes. No urinary/bowel incontinence. No significant weakness of leg. No rashes.    Review of Systems   Constitutional, HEENT, cardiovascular, pulmonary, GI, , musculoskeletal, neuro, skin, endocrine and psych systems are negative, except as otherwise noted.      Objective    /76 (BP Location: Left arm, Patient Position: Sitting, Cuff Size: Adult Regular)   Pulse 81   Temp 97.4  F (36.3  C) (Tympanic)   Wt 84.8 kg (187 lb)   SpO2 99%   BMI 27.62 kg/m    Body mass index is 27.62 kg/m .  Physical Exam   GENERAL: healthy, alert and no distress  NECK: no adenopathy, no asymmetry, masses, or scars and thyroid normal to palpation  RESP: lungs clear to auscultation - no rales, rhonchi or wheezes  CV: regular rate and rhythm, normal S1 S2, no S3 or S4, no murmur, click or rub, no peripheral edema and peripheral pulses strong  ABDOMEN: soft, nontender, no hepatosplenomegaly, no masses and bowel sounds normal  MS: no gross musculoskeletal defects noted, no edema  NEURO: +SLR on right    A/P:  (M79.661) Pain of right lower leg  (primary encounter diagnosis)  Comment:   Plan: predniSONE (DELTASONE) 20 MG tablet        Radicular symptoms. Trial oral prednisone taper dose for treatment. If no improvements will order MRI lumbar spine to r/o nerve claudication/irritation.    Cachorro Darnell MD          "

## 2021-08-16 ENCOUNTER — OFFICE VISIT (OUTPATIENT)
Dept: FAMILY MEDICINE | Facility: CLINIC | Age: 61
End: 2021-08-16
Payer: COMMERCIAL

## 2021-08-16 VITALS
BODY MASS INDEX: 27.47 KG/M2 | DIASTOLIC BLOOD PRESSURE: 80 MMHG | SYSTOLIC BLOOD PRESSURE: 136 MMHG | TEMPERATURE: 97.3 F | WEIGHT: 186 LBS | HEART RATE: 70 BPM | OXYGEN SATURATION: 98 %

## 2021-08-16 DIAGNOSIS — M54.16 LUMBAR RADICULOPATHY: Primary | ICD-10-CM

## 2021-08-16 PROCEDURE — 99213 OFFICE O/P EST LOW 20 MIN: CPT | Mod: 25 | Performed by: FAMILY MEDICINE

## 2021-08-16 PROCEDURE — 96372 THER/PROPH/DIAG INJ SC/IM: CPT | Performed by: FAMILY MEDICINE

## 2021-08-16 RX ORDER — TIZANIDINE 2 MG/1
2 TABLET ORAL 3 TIMES DAILY PRN
Qty: 30 TABLET | Refills: 0 | Status: SHIPPED | OUTPATIENT
Start: 2021-08-16 | End: 2021-08-23

## 2021-08-16 RX ORDER — MELOXICAM 15 MG/1
15 TABLET ORAL DAILY
Qty: 30 TABLET | Refills: 1 | Status: SHIPPED | OUTPATIENT
Start: 2021-08-16 | End: 2021-09-14

## 2021-08-16 RX ORDER — KETOROLAC TROMETHAMINE 30 MG/ML
30 INJECTION, SOLUTION INTRAMUSCULAR; INTRAVENOUS ONCE
Status: COMPLETED | OUTPATIENT
Start: 2021-08-16 | End: 2021-08-16

## 2021-08-16 RX ADMIN — KETOROLAC TROMETHAMINE 30 MG: 30 INJECTION, SOLUTION INTRAMUSCULAR; INTRAVENOUS at 12:16

## 2021-08-16 ASSESSMENT — PAIN SCALES - GENERAL: PAINLEVEL: EXTREME PAIN (9)

## 2021-08-16 NOTE — PROGRESS NOTES
"    Assessment & Plan     Lumbar radiculopathy   Patient is new to me.  Patient is here for concern of right lower leg pain started about 2 months ago.  He was seen in the clinic on 07/21/2021 for the same symptoms.  Prednisone 20 mg was prescribed for radicular symptoms.  He is here today with failed conservative management for radicular symptoms.I had a long discussion with the patient about his condition , diagnosis treatment options. We discussed diffenetial diagnosis, and expected course.  Discussed with patient that his leg pain is radiated pain from his back.  Will obtain MRI lumbar spine, he was given Toradol injection in the clinic.  He was prescribed Mobic 15 mg to help with his symptoms.  Patient verbalized understanding and agreed on the plan of care.  All questions answered.  I recommend the following :    - MR Lumbar Spine w/o Contrast; Future  - ketorolac (TORADOL) injection 30 mg  - meloxicam (MOBIC) 15 MG tablet; Take 1 tablet (15 mg) by mouth daily  - tiZANidine (ZANAFLEX) 2 MG tablet; Take 1 tablet (2 mg) by mouth 3 times daily as needed for muscle spasms    Next step depends on MRI results           BMI:   Estimated body mass index is 27.47 kg/m  as calculated from the following:    Height as of 10/15/20: 1.753 m (5' 9\").    Weight as of this encounter: 84.4 kg (186 lb).           Return in about 4 weeks (around 9/13/2021), or if symptoms worsen or fail to improve, for Follow up, in person.    Yoni Encarnacion MD  [facility]   Ed is a 61 year old who presents for the following health issues     HPI     Follow up right lower leg, has got worse  Patient is here for right leg pain for 2 months, no injury or trauma   Pain located in the knee down to the foot   Associated with back pain   Pain described as burning and tingling , aggravated by standing up, no alleviating factors       Review of Systems   Constitutional, HEENT, cardiovascular, pulmonary, gi and gu " systems are negative, except as otherwise noted.      Objective    /80   Pulse 70   Temp 97.3  F (36.3  C) (Tympanic)   Wt 84.4 kg (186 lb)   SpO2 98%   BMI 27.47 kg/m    Body mass index is 27.47 kg/m .  Physical Exam  Musculoskeletal:      Lumbar back: Tenderness present. No swelling, edema, deformity, signs of trauma, lacerations, spasms or bony tenderness. Normal range of motion. Positive right straight leg raise test. Negative left straight leg raise test. No scoliosis.        Legs:

## 2021-08-23 DIAGNOSIS — M54.16 LUMBAR RADICULOPATHY: ICD-10-CM

## 2021-08-23 RX ORDER — TIZANIDINE 2 MG/1
2 TABLET ORAL 3 TIMES DAILY PRN
Qty: 30 TABLET | Refills: 0 | Status: SHIPPED | OUTPATIENT
Start: 2021-08-23 | End: 2021-09-01

## 2021-08-23 NOTE — TELEPHONE ENCOUNTER
Routing refill request to provider for review/approval because:  Drug not on the [facility] refill protocol     Krystal SOLORION, RN

## 2021-08-26 ENCOUNTER — ANCILLARY PROCEDURE (OUTPATIENT)
Dept: MRI IMAGING | Facility: CLINIC | Age: 61
End: 2021-08-26
Attending: FAMILY MEDICINE
Payer: COMMERCIAL

## 2021-08-26 DIAGNOSIS — M54.16 LUMBAR RADICULOPATHY: ICD-10-CM

## 2021-08-26 PROCEDURE — 72148 MRI LUMBAR SPINE W/O DYE: CPT | Mod: TC | Performed by: RADIOLOGY

## 2021-09-01 ENCOUNTER — TELEPHONE (OUTPATIENT)
Dept: FAMILY MEDICINE | Facility: CLINIC | Age: 61
End: 2021-09-01

## 2021-09-01 DIAGNOSIS — M54.16 LUMBAR RADICULOPATHY: ICD-10-CM

## 2021-09-01 RX ORDER — TIZANIDINE 2 MG/1
2 TABLET ORAL 3 TIMES DAILY PRN
Qty: 30 TABLET | Refills: 0 | Status: SHIPPED | OUTPATIENT
Start: 2021-09-01 | End: 2022-06-13

## 2021-09-01 NOTE — TELEPHONE ENCOUNTER
PAULETTE    I sent [employer] message     Jose Ward,  MRI showed some disc bulge which could be the cause of your pain. It also showed chronic compression fracture which likely the cause of back pain     I recommend referral to spine specialist for further evaluation an management     They may consider local injection or minimal invasive surgery to treat your condition     Referral [facility] will call you to coordinate your care. If you don't hear from a representative within 2 business days, please call [phone].    Please continue with the plan as we discussed in the clinic.  Feel free to contact the clinic with any questions or concerns. Thank you for allowing us to participate in your care.    Yoni Encarnacion MD.

## 2021-09-01 NOTE — TELEPHONE ENCOUNTER
Patient called back and he said he read the Result Note from provider.   We reviewed the message and I gave him the scheduling number for the spine specialist.     Krystal SOLORION, RN

## 2021-09-01 NOTE — TELEPHONE ENCOUNTER
Left message on answering machine for patient/parent to call back.   [phone]  Gladys Schmitt RN

## 2021-09-01 NOTE — TELEPHONE ENCOUNTER
Patient calling to get the results of his MRI of the Lumbar Spine. Please call patient at [phone].  Jodi Hampton [facility]  2nd Floor  Primary Care

## 2021-09-14 ENCOUNTER — OFFICE VISIT (OUTPATIENT)
Dept: NEUROSURGERY | Facility: CLINIC | Age: 61
End: 2021-09-14
Payer: COMMERCIAL

## 2021-09-14 VITALS
WEIGHT: 185 LBS | SYSTOLIC BLOOD PRESSURE: 108 MMHG | HEART RATE: 96 BPM | BODY MASS INDEX: 27.4 KG/M2 | HEIGHT: 69 IN | DIASTOLIC BLOOD PRESSURE: 71 MMHG | OXYGEN SATURATION: 95 %

## 2021-09-14 DIAGNOSIS — M54.16 LUMBAR RADICULOPATHY: ICD-10-CM

## 2021-09-14 DIAGNOSIS — M99.04 SACROILIAC JOINT SOMATIC DYSFUNCTION: Primary | ICD-10-CM

## 2021-09-14 PROCEDURE — 99205 OFFICE O/P NEW HI 60 MIN: CPT | Mod: 25 | Performed by: PREVENTIVE MEDICINE

## 2021-09-14 PROCEDURE — 98925 OSTEOPATH MANJ 1-2 REGIONS: CPT | Performed by: PREVENTIVE MEDICINE

## 2021-09-14 ASSESSMENT — MIFFLIN-ST. JEOR: SCORE: 1639.53

## 2021-09-14 NOTE — PROGRESS NOTES
___________________________________    SUBJECTIVE:  HPI:  Ed Rudolph  Is a 61 year old male who presents today for new patient evaluation of low back pain onset was 3 months ago after he helped a friend move furniture and boxes.  The next day he was stiff in his low back and gradually over the next few days he started noticing right lateral calf pain.  That has progressed to some tingling in the lateral calf into all 5 toes of the foot.  He has not noticed weakness, bowel or bladder dysfunction, sexual dysfunction, or saddle anesthesia.  There is been no change in his balance.  His sensation in the foot is normal although it tingles.    Symptoms improved with sitting  Symptoms worsened with walking, sleeping, and bending.    -Treatment to Date: He tried meloxicam but stopped that because it made him sleepy.  He is taking tizanidine currently and thinks it is helping.  He has not had physical therapy or chiropractic care.  MEDICATIONS:  Reviewed.     ALLERGIES:  Reviewed.   PAIN SCORE/DIAGRAM pain diagram shows pain in the right SI joint radiating diffusely down the right leg.  Pain initially 7, worst 9, best 8.  Following OMT, pain went to 4-5  OSWESTRY DISABILITY INDEX = 40%    Current Outpatient Medications   Medication     lisinopril-hydrochlorothiazide (ZESTORETIC) 20-25 MG tablet     meloxicam (MOBIC) 15 MG tablet     Multiple Vitamin (DAILY VITAMINS PO)     simvastatin (ZOCOR) 40 MG tablet     tiZANidine (ZANAFLEX) 2 MG tablet     traZODone (DESYREL) 50 MG tablet     No current facility-administered medications for this visit.       No Known Allergies    Past Medical History:   Diagnosis Date     Adjustment disorder with depressed mood 1/12/2016     Carpal tunnel syndrome 1/5/2007     Chest wall contusion 12/2/2015     Depression with suicidal ideation 12/21/2015     Erectile Dysfunction 1/5/2007     Hyperlipidemia LDL goal <130 10/31/2010     Hypertension goal BP (blood pressure) < 140/90 5/11/2011      Impaired fasting glucose      LEFT FRONTAL LOBE ABSCESS 1/03    treated surgically, resolved     LFT elevation 5/30/2012        Patient Active Problem List   Diagnosis     Erectile Dysfunction     Overweight (BMI 25.0-29.9)     Hyperlipidemia LDL goal <100     Essential hypertension with goal blood pressure less than 140/90     Impaired fasting glucose     High triglycerides     Prostatic nodule     RLS (restless legs syndrome)     Primary insomnia     Skin cyst     Elevated ferritin     Keloid scar, right neck     Fall     Major depressive disorder, recurrent episode (H)     Slac (scapholunate advanced collapse) of wrist, left     Alcohol dependence (H)       Past Surgical History:   Procedure Laterality Date     COLONOSCOPY WITH CO2 INSUFFLATION N/A 4/12/2018    Procedure: COLONOSCOPY WITH CO2 INSUFFLATION;  COLON SCREEN/ ARREDONDO;  Surgeon: Quintin Patton MD;  Location: [facility]     CRANIOTOMY  01/2003    x 2, for treatment of brain abscess       Family History   Adopted: Yes   Problem Relation Age of Onset     Family History Negative No family hx of         Unknown, adopted       Reviewed past medical, surgical, and family history with patient found on new patient intake packet located in [employer]MR [employer] tab.     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  He moved here from [place] approximately 15 years ago.  He used to play football but not any longer.  He still does shoot around some basketballs and did that a few days ago.    ROS:Specifically negative for bowel/bladder dysfunction, balance changes, headache, dizziness, foot drop, fevers, chills, appetite changes, nausea/vomiting, unexplained weight loss. Otherwise 13 systems reviewed and noncontributory to the chief complaint. Please see the patient's intake questionnaire from today for details.    OBJECTIVE:  Vital signs Per the nurse's note    PHYSICAL EXAMINATION:    --CONSTITUTIONAL:  Vital signs as above.  No acute distress.   The patient is well nourished and well groomed.  --PSYCHIATRIC:  Appropriate mood and affect. The patient is awake, alert, oriented to person, place, time and answering questions appropriately with clear speech.    --SKIN:  Skin over the face, bilateral lower extremities, and posterior torso is clean, dry, intact without rashes.    --RESPIRATORY: Normal rhythm and effort. No abnormal accessory muscle breathing patterns noted.   --ABDOMINAL:  Non-distended.  Nontender  --STANDING EXAMINATION:  Normal lumbar lordosis noted, no lateral shift.  --MUSCULOSKELETAL: Lumbar spine inspection reveals no evidence of deformity. Range of motion is initially limited in lumbar flexion with moderate pain hands to the knees.  Extension was 50% reduced side bending and rotation were normal and painless.. No tenderness to palpation lumbar spine. Straight leg raising in the seated position is was initially positive for roughly right L5 radicular pain on the right and was negative on the left.  Kemps test was positive to the right and both before and after manual medicine    Following manual medicine, range of motion was full in flexion with minimal pain and extension range was normal.  --PELVIC/HIP JOINTS:  Standing flexion positive right, stork test negative, Brenda's sign negative.  Leg lengths initially uneven, but became equal after manual medicine.   Long Sitting test right leg goes from 1 cm short to 2 cm short, and this resolved after manual medicine to equal.  Negative Hector's, negative piriformis, negative hip scour,   Hip Impingement, negative.  Spring testing positive over the right SI joint for pulse provocative pain, slightly positive over the L3-L5 spinous processes for nonconcordant pain..      PELVIC ALIGNMENT right posterior innominate rotation.         --NEUROLOGICAL:    --LOWER EXTREMITY MOTOR TESTING:  REFLEXES:  2/4 patellar,  And 0/4 achilles reflexes bilaterally.  GROSS MOTOR: Gait is non-antalgic. Easily arises  from a seated position. Flat foot, heel and toe walking, squatting and risng are normal without significant difficulty.  Duck walk was painful on the right  MANUAL MOTOR TESTING:  L3- S1 Myotomes 5/5.  SENSATION to light touch is diminished in the right foot and roughly an L5  distribution, but otherwise was intact in the bilateral L4, L5, and S1  dermatomes.     DURAL STRETCH TESTS:  Seated SLR positive on the right, increased with ankle dorsiflexion, and became negative after OMT.  --VASCULAR: Femoral pulses 2+. Lower extremity capillary refill, temperature and color normal.     RESULTS: Prior medical records from [facility] were reviewed today.    Imaging: Lumbar spine Imaging was personally reviewed and interpreted today. The images were shown to the patient and the findings were explained using a spine model.      MR Lumbar Spine w/o Contrast    Result Date: 8/27/2021  MR LUMBAR SPINE WITHOUT CONTRAST 8/26/2021   IMPRESSION: 1.  Multilevel degenerative disc disease is moderate at L5-S1 and more mild elsewhere. Accompanying small diffuse posterior disc bulges contribute to a multilevel mild spinal canal stenosis. 2.  Type I Modic endplate degenerative change at L5-S1 with smaller components of type I Modic endplate degenerative change anteriorly at L1-L2 and T11-T12. Additional degenerative osseous edema at the bilateral L3-L4 and left L4-L5 facets. 3.  Facet arthropathy is most pronounced at the L3-L4 level, where it is moderate bilaterally. 4.  Moderate right and moderate-to-severe left L5-S1 neural foraminal stenosis with contact of the exiting left L5 nerve. Additional mild-to-moderate bilateral L4-L5 neural foraminal stenosis with mild-to-moderate left L3-L4 neural foraminal stenosis. 5.  Chronic compression fracture with mild height loss along the superior endplate of L1. 6.  Epidural lipomatosis throughout the lumbar spine with an accompanying thecal sac effacement as detailed  above.    PROCEDURE NOTE: OMT to the right posterior innominate rotation realigned the pelvic structures.  Improvement in range of motion and function noted.  Diminished sensation in a right L5 pattern improved but was still somewhat abnormal in the foot and toe compared to previous.  The leg raise testing improved and was normal afterwards.    ASSESSMENT: Ed Rudolph is a 61 year old male who presents for consultation at the request of PCP Aris Garcia,who presents today for new patient evaluation of low back pain with right L5 radicular symptoms of 3 months duration.  He also had a pelvic joint dysfunction today manifesting as a right posterior innominate rotation.  He responded nicely to an attempt at manual medicine with resolution of some of his neurologic findings and improvement in his L5 symptoms as well as low back pain.  His function also improved in the room.   No myelopathic or red flag symptoms.        There are no diagnoses linked to this encounter.  PLAN:  Reviewed spine anatomy and disease process. Discussed diagnosis and treatment options with the patient today.   -DIAGNOSTIC TESTS:  Images were personally reviewed and interpreted and explained to patient today using spine model.   No further testing today    -PHYSICAL THERAPY: None ordered for now but we will reconsider this after the next visit  Discussed the importance of easy walking on level ground, gradually increasing bending stooping and twisting as tolerated, but avoiding more than 20 pounds of lifting for now.  -MEDICATIONS: I advised holding on the tizanidine unless he notices a significant increase in pain off of it.  He has already stopped the meloxicam.  Discussed multiple medication options today with patient. Discussed risks, side effects, and proper use of medications. Patient verbalized understanding.    -PATIENT EDUCATION:  Total time of 60 minutes spent with the patient, reviewing the chart, placing orders, counseling,  education, coordination of care, and documenting.     -FOLLOW-UP: 2 weeks  Advised patient to call the [facility] if symptoms worsen or you have problems controlling bladder and bowel function.   ___________________________________

## 2021-09-14 NOTE — NURSING NOTE
"Ed Rudolph's goals for this visit include: consult  He requests these members of his care team be copied on today's visit information:     PCP: Aris Garcia    Referring Provider:  Yoni Encarnacion MD  [id number] [place] [address]    /71   Pulse 96   Ht 1.753 m (5' 9\")   Wt 83.9 kg (185 lb)   SpO2 95%   BMI 27.32 kg/m      Do you need any medication refills at today's visit? n  "

## 2021-09-14 NOTE — PATIENT INSTRUCTIONS
It was nice to meet you today.  You had a pelvic joint dysfunction on the right side which I believe was unleveling the basement of your spine causing some pinching of the nerves in the segments just above the pelvis.  Given your positive response to treatment today, and your nearly normal neurologic function, I recommend we go slow rather than jumping into things like surgery or injections.  Try easy walking on level ground and gradually increasing your bending and stooping as tolerated.  Avoid lifting more than 20 pounds.  If your pain is doing well, I recommend stopping the tizanidine, which you can always resume if the pain comes back.  Recheck in 2 weeks and will go from there.  We can always add on some physical therapy if needed.

## 2021-09-19 ENCOUNTER — HEALTH MAINTENANCE LETTER (OUTPATIENT)
Age: 61
End: 2021-09-19

## 2021-09-28 ENCOUNTER — OFFICE VISIT (OUTPATIENT)
Dept: NEUROSURGERY | Facility: CLINIC | Age: 61
End: 2021-09-28
Payer: COMMERCIAL

## 2021-09-28 VITALS
HEART RATE: 112 BPM | DIASTOLIC BLOOD PRESSURE: 54 MMHG | SYSTOLIC BLOOD PRESSURE: 88 MMHG | WEIGHT: 181.6 LBS | BODY MASS INDEX: 26.82 KG/M2

## 2021-09-28 DIAGNOSIS — M99.04 SACROILIAC JOINT SOMATIC DYSFUNCTION: ICD-10-CM

## 2021-09-28 DIAGNOSIS — M54.16 LUMBAR RADICULOPATHY: Primary | ICD-10-CM

## 2021-09-28 PROCEDURE — 99214 OFFICE O/P EST MOD 30 MIN: CPT | Performed by: PREVENTIVE MEDICINE

## 2021-09-28 ASSESSMENT — PAIN SCALES - GENERAL: PAINLEVEL: SEVERE PAIN (6)

## 2021-09-28 NOTE — PATIENT INSTRUCTIONS
It was good to see you again Ed.  I am glad that adjusting your Pelvic Joint Dysfunction was helpful for you.  I will order the cortisone injection in your spine and hopefully that will help the tingling in your right leg and the low back pain.  In the meantime, I want you to make sure you check with your family doctor because your blood pressure was a bit low and your pulse was a bit high today.  Please do that within the next week.  See me back in 1 month.

## 2021-09-28 NOTE — PROGRESS NOTES
"    Subjective:     Ed Rudolph is a 61 year old male who presents today for follow-up regarding Low back pain and Pelvic Joint Dysfunction of 3 months duration following lifting and moving of furniture and belongings.  He was seen on 9/14/21 and treated for a right posterior innominate rotation with good response as well as some improvement in his L5 neurologic symptoms and findings.    Ed says that he is feeling \"incredibly a lot better\", and that the previously \"unbearable\" pain is now \"bearable.\"  His pain has come down from a 9/10 to a 6/10, his quality of life has improved, and his activities of daily living are better tolerated.  He is still unable to stand for long periods of time.  He still has constant low back pain but it is improved.  He still has constant tingling in his right leg that he nicely draws in an L5 distribution from the knee to the foot.  There is no weakness in the leg and there is no true numbness.  No bowel or bladder dysfunction.  Not having any chest pain shortness of breath lightheadedness or imbalance issues.  He has a \"tendency to be a little clumsy\" so he watches his step in the shower but is not tripping and falling despite what he had listed on his review of systems today.    Past medical history is reviewed and is unchanged for any new medical diagnoses in the interim.  Noncontributory    Family  and Social history is reviewed and is unchanged in the interim.  Noncontributory.    Review of Systems:  Pertinent negatives include no  fevers, chills, unexplained weight loss, bowel incontinence, bladder incontinence, trips, stumbles, falls.  Specifically also negative for hematochezia, chest pain, belly pain, shortness of breath, dizziness.  All others reviewed and are negative.      Objective:   CONSTITUTIONAL:  Vital signs as above.  Initial seated vitals right arm blood pressure 88/54, pulse 112.  Repeated by me 15 minutes later in the left arm seated: Blood pressure 97/64, " pulse 103.  No acute distress.  The patient is well nourished and well groomed.    PSYCHIATRIC:  The patient is awake, alert, oriented to person, place and time.  The patient is answering questions appropriately with clear speech.  Normal affect.  Able to follow commands  SKIN:  Skin over the face, posterior torso, bilateral upper and lower extremities is clean, dry, intact without rashes.  MUSCULOSKELETAL:  Gait is non-antalgic.  The patient is able to heel and toe walk without any difficulty.  no tenderness over the  lumbar paraspinal muscles.        NEUROLOGICAL: patellar, achilles reflexes which are symmetric bilaterally.    Sensation to light touch is intact in the bilateral L4, L5, and S1 dermatomes.  The patient has 5/5 strength in L3-S1 myotomes, femoral/obturator/peroneal/tibial nerves.  He walks on flat feet heels and toes and he can squat and rise.  Pelvic alignment is neutral today.  Long sitting test, Brenda sign, and stork test negative.  Any flexion test negative.  Lumbar range of motion full fluid and painless.    Imaging: Again I reviewed his lumbar MRI images and report from 8/26/2021 and I am beginning to wonder about the diffuse epidural lipomatosis noted in the lower 4 lumbar levels.    Assessment        Ed Rudolph  is a 61 year old y.o. male with past medical history significant for hypertension who presents today for follow-up regarding back pain, Pelvic Joint Dysfunction, and right L5 radicular symptoms.  There is no Pelvic Joint Dysfunction evident today.  He is very pleased with his progress since his last visit and feels that the adjustment significantly improved his symptoms.  He would like to have the rest of the symptoms get better however and is interested in an injection.  He also has diffuse epidural lipomatosis.  I also pointed out to him his blood pressure and pulse today, which are fairly asymptomatic.  I do want him following with his family doctor for that  however.      Plan:   Lumbar epidural injection interlaminar will be ordered, but I want the performing physician to review his MRI to make sure that the epidural lipomatosis will not be a factor.  Follow-up here in a month and continue to advance activities as tolerated.  ______________________________________________________________________

## 2021-09-28 NOTE — LETTER
"    9/28/2021         RE: Ed Rudolph  [address]  [place] [facility] [address]        Dear Colleague,    Thank you for referring your patient, Ed Rudolph, to the [facility] [place]. Please see a copy of my visit note below.        Subjective:     Ed Rudolph is a 61 year old male who presents today for follow-up regarding Low back pain and Pelvic Joint Dysfunction of 3 months duration following lifting and moving of furniture and belongings.  He was seen on 9/14/21 and treated for a right posterior innominate rotation with good response as well as some improvement in his L5 neurologic symptoms and findings.    Ed says that he is feeling \"incredibly a lot better\", and that the previously \"unbearable\" pain is now \"bearable.\"  His pain has come down from a 9/10 to a 6/10, his quality of life has improved, and his activities of daily living are better tolerated.  He is still unable to stand for long periods of time.  He still has constant low back pain but it is improved.  He still has constant tingling in his right leg that he nicely draws in an L5 distribution from the knee to the foot.  There is no weakness in the leg and there is no true numbness.  No bowel or bladder dysfunction.  Not having any chest pain shortness of breath lightheadedness or imbalance issues.  He has a \"tendency to be a little clumsy\" so he watches his step in the shower but is not tripping and falling despite what he had listed on his review of systems today.    Past medical history is reviewed and is unchanged for any new medical diagnoses in the interim.  Noncontributory    Family  and Social history is reviewed and is unchanged in the interim.  Noncontributory.    Review of Systems:  Pertinent negatives include no  fevers, chills, unexplained weight loss, bowel incontinence, bladder incontinence, trips, stumbles, falls.  Specifically also negative for hematochezia, chest pain, belly " pain, shortness of breath, dizziness.  All others reviewed and are negative.      Objective:   CONSTITUTIONAL:  Vital signs as above.  Initial seated vitals right arm blood pressure 88/54, pulse 112.  Repeated by me 15 minutes later in the left arm seated: Blood pressure 97/64, pulse 103.  No acute distress.  The patient is well nourished and well groomed.    PSYCHIATRIC:  The patient is awake, alert, oriented to person, place and time.  The patient is answering questions appropriately with clear speech.  Normal affect.  Able to follow commands  SKIN:  Skin over the face, posterior torso, bilateral upper and lower extremities is clean, dry, intact without rashes.  MUSCULOSKELETAL:  Gait is non-antalgic.  The patient is able to heel and toe walk without any difficulty.  no tenderness over the  lumbar paraspinal muscles.        NEUROLOGICAL: patellar, achilles reflexes which are symmetric bilaterally.    Sensation to light touch is intact in the bilateral L4, L5, and S1 dermatomes.  The patient has 5/5 strength in L3-S1 myotomes, femoral/obturator/peroneal/tibial nerves.  He walks on flat feet heels and toes and he can squat and rise.  Pelvic alignment is neutral today.  Long sitting test, Brenda sign, and stork test negative.  Any flexion test negative.  Lumbar range of motion full fluid and painless.    Imaging: Again I reviewed his lumbar MRI images and report from 8/26/2021 and I am beginning to wonder about the diffuse epidural lipomatosis noted in the lower 4 lumbar levels.    Assessment        Ed Rudolph  is a 61 year old y.o. male with past medical history significant for hypertension who presents today for follow-up regarding back pain, Pelvic Joint Dysfunction, and right L5 radicular symptoms.  There is no Pelvic Joint Dysfunction evident today.  He is very pleased with his progress since his last visit and feels that the adjustment significantly improved his symptoms.  He would like to have the rest of  the symptoms get better however and is interested in an injection.  He also has diffuse epidural lipomatosis.  I also pointed out to him his blood pressure and pulse today, which are fairly asymptomatic.  I do want him following with his family doctor for that however.      Plan:   Lumbar epidural injection interlaminar will be ordered, but I want the performing physician to review his MRI to make sure that the epidural lipomatosis will not be a factor.  Follow-up here in a month and continue to advance activities as tolerated.  ______________________________________________________________________        Again, thank you for allowing me to participate in the care of your patient.        Sincerely,        Sravan Kumar MD

## 2021-10-04 ENCOUNTER — TELEPHONE (OUTPATIENT)
Dept: NEUROSURGERY | Facility: CLINIC | Age: 61
End: 2021-10-04

## 2021-10-04 NOTE — TELEPHONE ENCOUNTER
LVM informing pt that message will be sent to provider to place injection order and then surgery scheduler will be in touch to make appointment. Appt possibly available for this Friday so patient advised that he will also need COVID test which will be scheduled as well. Pt advised to CB if any other concerns in the meantime. Otherwise, he is to wait for call from scheduling.       Velma Mederos, RNCC  Neurology

## 2021-10-04 NOTE — TELEPHONE ENCOUNTER
[facility] Call Center    Phone Message    May a detailed message be left on voicemail: yes     Reason for Call: Pt would like to schedule spinal injection.  No order has been placed yet.  Please call him to let him know once the order has been placed so he can schedule.  Thanks.    Action Taken: Message routed to:  Adult Clinics: Neurology p [phone]    Travel Screening: Not Applicable

## 2021-10-06 DIAGNOSIS — Z11.59 ENCOUNTER FOR SCREENING FOR OTHER VIRAL DISEASES: ICD-10-CM

## 2021-10-06 DIAGNOSIS — M54.16 LUMBAR RADICULITIS: Primary | ICD-10-CM

## 2021-10-06 NOTE — TELEPHONE ENCOUNTER
Patient called back. He wanted to double check if he needed to get the covid test if he was already vaccinated. Told him that yes, he will need to get the test. He asked when someone would be reaching out to schedule. I stated most likely today, but if he doesn't hear from anyone today, he can reach out to us tomorrow, and we will try to find out why he hasn't been contacted.

## 2021-10-06 NOTE — TELEPHONE ENCOUNTER
Date Scheduled: 10-15-21  Facility: [facility]  Surgeon: Dr. Drew   Post-op appointment scheduled:    scheduled?: No  Surgery packet/instructions confirmed received?  Yes  Special Considerations:   Agnes Coffey, Surgery Scheduling Coordinator

## 2021-10-12 ENCOUNTER — LAB (OUTPATIENT)
Dept: URGENT CARE | Facility: URGENT CARE | Age: 61
End: 2021-10-12
Payer: COMMERCIAL

## 2021-10-12 DIAGNOSIS — Z11.59 ENCOUNTER FOR SCREENING FOR OTHER VIRAL DISEASES: ICD-10-CM

## 2021-10-12 PROCEDURE — U0005 INFEC AGEN DETEC AMPLI PROBE: HCPCS

## 2021-10-12 PROCEDURE — U0003 INFECTIOUS AGENT DETECTION BY NUCLEIC ACID (DNA OR RNA); SEVERE ACUTE RESPIRATORY SYNDROME CORONAVIRUS 2 (SARS-COV-2) (CORONAVIRUS DISEASE [COVID-19]), AMPLIFIED PROBE TECHNIQUE, MAKING USE OF HIGH THROUGHPUT TECHNOLOGIES AS DESCRIBED BY CMS-2020-01-R: HCPCS

## 2021-10-13 LAB — SARS-COV-2 RNA RESP QL NAA+PROBE: NEGATIVE

## 2021-10-15 ENCOUNTER — ANCILLARY PROCEDURE (OUTPATIENT)
Dept: GENERAL RADIOLOGY | Facility: CLINIC | Age: 61
End: 2021-10-15
Attending: PHYSICAL MEDICINE & REHABILITATION
Payer: COMMERCIAL

## 2021-10-15 ENCOUNTER — HOSPITAL ENCOUNTER (OUTPATIENT)
Facility: AMBULATORY SURGERY CENTER | Age: 61
Discharge: HOME | End: 2021-10-15
Attending: PHYSICAL MEDICINE & REHABILITATION | Admitting: PHYSICAL MEDICINE & REHABILITATION
Payer: COMMERCIAL

## 2021-10-15 VITALS
SYSTOLIC BLOOD PRESSURE: 138 MMHG | OXYGEN SATURATION: 99 % | RESPIRATION RATE: 16 BRPM | DIASTOLIC BLOOD PRESSURE: 83 MMHG | TEMPERATURE: 97.9 F

## 2021-10-15 DIAGNOSIS — M54.16 LUMBAR RADICULITIS: ICD-10-CM

## 2021-10-15 DIAGNOSIS — R52 PAIN: ICD-10-CM

## 2021-10-15 PROCEDURE — G8918 PT W/O PREOP ORDER IV AB PRO: HCPCS

## 2021-10-15 PROCEDURE — G8907 PT DOC NO EVENTS ON DISCHARG: HCPCS

## 2021-10-15 PROCEDURE — 64483 NJX AA&/STRD TFRM EPI L/S 1: CPT | Mod: RT

## 2021-10-15 RX ORDER — ACETAMINOPHEN 500 MG
500-1000 TABLET ORAL EVERY 6 HOURS PRN
COMMUNITY
End: 2023-01-26

## 2021-10-15 RX ORDER — IOPAMIDOL 408 MG/ML
INJECTION, SOLUTION INTRATHECAL PRN
Status: DISCONTINUED | OUTPATIENT
Start: 2021-10-15 | End: 2021-10-15 | Stop reason: HOSPADM

## 2021-10-15 RX ORDER — DEXAMETHASONE SODIUM PHOSPHATE 10 MG/ML
INJECTION INTRAMUSCULAR; INTRAVENOUS PRN
Status: DISCONTINUED | OUTPATIENT
Start: 2021-10-15 | End: 2021-10-15 | Stop reason: HOSPADM

## 2021-10-15 RX ORDER — LIDOCAINE HYDROCHLORIDE 10 MG/ML
INJECTION, SOLUTION EPIDURAL; INFILTRATION; INTRACAUDAL; PERINEURAL PRN
Status: DISCONTINUED | OUTPATIENT
Start: 2021-10-15 | End: 2021-10-15 | Stop reason: HOSPADM

## 2021-10-15 NOTE — PROCEDURES
PROCEDURE NOTE: Lumbar Transforaminal Epidural Steroid Injection Under Fluoroscopic Guidance    PROCEDURE DATE: 10/15/2021    PATIENT NAME: Ed Rudolph  YOB: 1960    ATTENDING PHYSICIAN: Juan Drew MD   RESIDENT/FELLOW PHYSICIAN: None    PREOPERATIVE DIAGNOSIS:   Lumbar radiculitis   POSTOPERATIVE DIAGNOSIS: same    PROCEDURE PERFORMED: Right  Lumbar Transforaminal Epidural Steroid Injection at the L5-S1 level    FLUOROSCOPY WAS USED.    INDICATIONS FOR PROCEDURE:   Ed Rudolph is a 61 year old male with a clinical picture consistent with the above-mentioned diagnosis, resulting in radicular pain to the right lower extremity.    PROCEDURE AND FINDINGS:    he was greeted in the pre-procedure holding area. The risk, benefits and alternatives to the procedure were again reviewed with the patient and written informed consent was placed in the chart. An IV line was not placed.  A 500 mL bag of NS was not connected to the patient. he was taken to the procedure room and positioned prone on the fluoroscopy table.  Routine monitors were applied including EKG leads, blood pressure cuff, and pulse oximetry. Prior to the procedure a time out was completed, verifying correct patient, procedure, site, positioning, and implants and/or special equipment.     An AP fluoroscopic  film was taken to identify the L5 pedicle and the S1 superior articulating process. The skin was prepped with chlorhexidine and draped in the usual sterile fashion. The skin and subcutaneous tissue overlying the aforementioned anatomic targets were anesthetized using a 25-gauge 1-1/2 inch needle with 1% preservative-free lidocaine for a total volume of 2 mls.      Then a 22-gauge 5 inch Quincke spinal needle was advanced under fluoroscopic guidance using an oblique view just inferior to the pedicle of the L5 level(s) on the right side(s).  Then, utilizing AP and lateral fluoroscopic views, we confirmed the position of the  needle tip to be within the neural foramen. After negative aspiration, 0.5 mls of isovue 200-220mg/ml contrast was injected under AP view at each level and confirmed adequate spread along the nerve root and in the epidural space. There was no evidence of intravascular uptake or intrathecal spread on imaging.     At this point, after negative aspiration, a total 1.5mL volume of treatment injectate, consisting of 1mL Dexamethasone (10mg/mL), and 0.5mL of 1% Lidocaine, was injected easily.  The needle was then flushed with 0.5 ml of local anesthetic and removed. The needle insertion site was dressed appropriately.     Before the procedure, he reported a pain score of 6/10. After the procedure, he reported a pain score of 4/10.      Ed was taken to the recovery room where he was monitored for a brief period of time. He tolerated the procedure well and was discharged home in stable condition with post procedural instructions.     Follow-up will be [facility] with Dr. Kumar    COMPLICATIONS:  None    Comment(s):  None

## 2021-10-15 NOTE — DISCHARGE INSTRUCTIONS
PAIN INJECTION HOME CARE INSTRUCTIONS  Activity  -You may resume most normal activity levels with the exception of strenuous activity. It may help to move in ways that hurt before the injection, to see if the pain is still there, but do not overdo it. Take it easy for the rest of the day.    -DO NOT remove bandaid for 24 hours  -DO NOT shower for 24 hours      Pain  -You may feel immediate pain relief and numbness for a period of time after the injection. This may indicate the medication has reached the right spot.  -Your pain may return after this short pain-free period, or may even be a little worse for a day or two. It may be caused by needle irritation or by the medication itself. The medications usually take two or three days to start working, but can take as long as a week.    -You may use an ice pack for 20 minutes every 2 hours for the first 24 hours  -You may use a heating pad after the first 24 hours  -You may use Tylenol (acetaminophen) every 4 hours or other pain medicines as directed by your physician        DID YOU RECEIVE STEROIDS TODAY?  Yes    Common side effects of steroids:  Not everyone will experience corticosteroid side effects. If side effects are experienced, they will gradually subside in the 7-10 day period following an injection. Most common side effects include:  -Flushed face and/or chest  -Feeling of warmth, particularly in the face but could be an overall feeling of warmth  -Increased blood sugar in diabetic patients  -Menstrual irregularities my occur. If taking hormone-based birth control an alternate method of birth control is recommended  -Sleep disturbances and/or mood swings are possible  -Leg cramps    PLEASE KEEP TRACK OF YOUR SYMPTOMS AND NOTE ANY CHANGES FOR YOUR DOCTOR.       Please contact us if you have:  -Severe pain  -Fever more than 101.5 degrees Fahrenheit  -Signs of infection at the injection site (redness, swelling, or drainage)    If you have questions, please  contact the Pain Clinic at [phone] Option #1 between the hours of 7:00 am and 3:00 pm Monday through Friday. After office hours you can contact the on call provider by dialing [phone]. If you need immediate attention, we recommend that you go to a hospital emergency room or dial [phone].    For patients seen by the PM and R service, please call [phone].    If you have procedure scheduling questions please call [phone] Option #2

## 2021-11-27 ENCOUNTER — OFFICE VISIT (OUTPATIENT)
Dept: URGENT CARE | Facility: URGENT CARE | Age: 61
End: 2021-11-27
Payer: COMMERCIAL

## 2021-11-27 VITALS
RESPIRATION RATE: 20 BRPM | SYSTOLIC BLOOD PRESSURE: 131 MMHG | BODY MASS INDEX: 27.76 KG/M2 | TEMPERATURE: 96.3 F | WEIGHT: 188 LBS | HEART RATE: 91 BPM | DIASTOLIC BLOOD PRESSURE: 73 MMHG | OXYGEN SATURATION: 95 %

## 2021-11-27 DIAGNOSIS — R22.0 LEFT FACIAL SWELLING: Primary | ICD-10-CM

## 2021-11-27 PROCEDURE — 99213 OFFICE O/P EST LOW 20 MIN: CPT | Performed by: STUDENT IN AN ORGANIZED HEALTH CARE EDUCATION/TRAINING PROGRAM

## 2021-11-27 ASSESSMENT — PAIN SCALES - GENERAL: PAINLEVEL: NO PAIN (0)

## 2021-11-27 NOTE — PROGRESS NOTES
"Urgent Care Visit    Assessment & Plan   1. Left facial swelling  Differential includes but is not limited to dental infection, parotitis/sialadenitis, less concerning but considered was allergic reaction. Unilateral left facial swelling that is improving. This same presentation occurred in the past and dentist gave abx. I will treat for possible infection, but low suspicion given no tenderness / signs of abscess. He does have partials though (needs dental follow up). Suspect possible parotitis/sialadenitis. Will treat with abx and discussed using lemon drops, ice for swelling. He will follow up with PCP. ED precautions discussed.   - amoxicillin-clavulanate (AUGMENTIN) 875-125 MG tablet; Take 1 tablet by mouth 2 times daily  Dispense: 14 tablet; Refill: 0           Options for treatment and follow-up care were reviewed with the patient who was engaged and actively involved in the decision making process, verbalized understanding of the options discussed, and satisfied with the final plan.      Hiwot Vinson MD    Subjective   Ed Rudolph is a 61 year old male with a history including HTN, HLD, depressive disorder, who presents for facial swelling.    Yesterday, afternoon developed left sided swelling in the face. It is not painful, just \"looks ugly.\" He has partials on upper and lower. The majority of his teeth are removed. No new foods that he knows of. No fevers/chills. No trouble breathing or swallowing. No rashes. No nausea, vomiting, diarrhea. Swelling has improved since last evening. Has not tried anything for symptoms.    Four months ago had swelling in the face and saw his dentist, was given amoxicillin.     He has a regular dentist. Dr. Garcia is his PCP.      Patient Active Problem List    Diagnosis Date Noted     Essential hypertension with goal blood pressure less than 140/90 05/11/2011     Priority: High     Hyperlipidemia LDL goal <100 10/31/2010     Priority: High     Possible familial " hypercholesterolemia, baseline:   Ref. Range 6/16/2004   Cholesterol Latest Ref Range: <200 mg/dL 304 (H)   LDL Cholesterol Calculated Latest Ref Range: <130 mg/dL 203 (H)          Lumbar radiculitis 10/06/2021     Priority: Medium     Added automatically from request for surgery [id number]       Alcohol dependence (H) 10/15/2020     Priority: Medium     Slac (scapholunate advanced collapse) of wrist, left 05/02/2016     Priority: Medium     Major depressive disorder, recurrent episode (H) 12/21/2015     Priority: Medium     Hx suicidal ideation       Fall 12/02/2015     Priority: Medium     Keloid scar, right neck 06/26/2013     Priority: Medium     Elevated ferritin 03/28/2013     Priority: Medium     Primary insomnia 03/20/2013     Priority: Medium     Skin cyst 03/20/2013     Priority: Medium     Prostatic nodule 05/30/2012     Priority: Medium     High triglycerides 11/24/2011     Priority: Medium     Impaired fasting glucose      Priority: Medium     Overweight (BMI 25.0-29.9) 10/15/2010     Priority: Medium     Erectile Dysfunction 01/05/2007     Priority: Medium       Social: He reports that he has never smoked. He has never used smokeless tobacco. He reports current alcohol use of about 14.0 standard drinks of alcohol per week. He reports that he does not use drugs.    There are no exam notes on file for this visit.    Objective     Vitals:    11/27/21 0910   BP: 131/73   BP Location: Left arm   Patient Position: Sitting   Cuff Size: Adult Regular   Pulse: 91   Resp: 20   Temp: (!) 96.3  F (35.7  C)   TempSrc: Tympanic   SpO2: 95%   Weight: 85.3 kg (188 lb)     Body mass index is 27.76 kg/m .    GEN: NAD, healthy, alert  Constitutional: Awake, alert, cooperative, no acute distress, and appears stated age.  HEENT: NC/AT, EOMI, normal conjunctivae/sclerae, clear oropharynx, partials removed; no tenderness to teeth that are left over or gums or cheek; no masses felt in the cheek; left lower jaw/cheeck mild  swelling; no neck swelling or tenderness. Parotid opening normal.  Neck: Supple, symmetrical, trachea midline. No submandibular LAD.  Lungs: No increased WOB.   Musculoskeletal: No redness, warmth, or swelling of the joints. Tone is normal.  Neurologic: A&Ox3.  Cranial nerves II-XII are grossly intact.  Sensory is intact and gait is normal.  Neuropsychiatric: Normal affect, mood, orientation, memory and insight.  Skin: No visible rashes, erythema, pallor, petechia or purpura.    Labs:  No visits with results within 1 Month(s) from this visit.   Latest known visit with results is:   Lab on 10/12/2021   Component Date Value Ref Range Status     SARS CoV2 PCR 10/12/2021 Negative  Negative Final    NEGATIVE: SARS-CoV-2 (COVID-19) RNA not detected, presumed negative.

## 2021-11-27 NOTE — PATIENT INSTRUCTIONS
Lemon drops    Antibiotics twice a day for 7 days    Ice for swelling    Tylenol and ibuprofen okay    FOLLOW UP WITH PRIMARY DOCTOR AND DENTIST NEXT WEEK.

## 2021-12-08 ENCOUNTER — OFFICE VISIT (OUTPATIENT)
Dept: NEUROSURGERY | Facility: CLINIC | Age: 61
End: 2021-12-08
Payer: COMMERCIAL

## 2021-12-08 VITALS
HEIGHT: 70 IN | HEART RATE: 101 BPM | BODY MASS INDEX: 26.92 KG/M2 | DIASTOLIC BLOOD PRESSURE: 75 MMHG | WEIGHT: 188 LBS | SYSTOLIC BLOOD PRESSURE: 114 MMHG

## 2021-12-08 DIAGNOSIS — M99.04 SACROILIAC JOINT SOMATIC DYSFUNCTION: Primary | ICD-10-CM

## 2021-12-08 DIAGNOSIS — M54.16 LUMBAR RADICULOPATHY: ICD-10-CM

## 2021-12-08 PROCEDURE — 99214 OFFICE O/P EST MOD 30 MIN: CPT | Performed by: PREVENTIVE MEDICINE

## 2021-12-08 RX ORDER — PREDNISONE 10 MG/1
30 TABLET ORAL 2 TIMES DAILY
Qty: 30 TABLET | Refills: 0 | Status: SHIPPED | OUTPATIENT
Start: 2021-12-08 | End: 2022-05-13

## 2021-12-08 ASSESSMENT — MIFFLIN-ST. JEOR: SCORE: 1661.07

## 2021-12-08 ASSESSMENT — PAIN SCALES - GENERAL: PAINLEVEL: SEVERE PAIN (7)

## 2021-12-08 NOTE — NURSING NOTE
"Reason For Visit:   Chief Complaint   Patient presents with     RECHECK         Occupation: [employer]  Currently working? Yes.  Work status?  Full time.    Sports: no  Activities: no             /75   Pulse 101   Ht 1.765 m (5' 9.5\")   Wt 85.3 kg (188 lb)   BMI 27.36 kg/m        No Known Allergies    Current Outpatient Medications   Medication     acetaminophen (TYLENOL) 500 MG tablet     amoxicillin-clavulanate (AUGMENTIN) 875-125 MG tablet     lisinopril-hydrochlorothiazide (ZESTORETIC) 20-25 MG tablet     Multiple Vitamin (DAILY VITAMINS PO)     simvastatin (ZOCOR) 40 MG tablet     tiZANidine (ZANAFLEX) 2 MG tablet     traZODone (DESYREL) 50 MG tablet     No current facility-administered medications for this visit.         Darla Severin-Brown, LPN  "

## 2021-12-08 NOTE — LETTER
"    12/8/2021         RE: Ed Rudolph  [address]  [place] [address]        Dear Colleague,    Thank you for referring your patient, Ed Rudolph, to the [facility] [place]. Please see a copy of my visit note below.      Subjective:  Ed Rudolph is a 61 year old male who presents today for follow-up regarding Pelvic Joint Dysfunction which on 9/14/2021 was a right posterior innominate rotation.  There was also a right L5 radicular pain pattern.    PRIOR HISTORY from 9/14/2021:  He presented with low back pain, onset was 3 months ago after he helped a friend move furniture and boxes.  The next day he was stiff in his low back and gradually over the next few days he started noticing right lateral calf pain.  That has progressed to some tingling in the lateral calf into all 5 toes of the foot.  He has not noticed weakness, bowel or bladder dysfunction, sexual dysfunction, or saddle anesthesia.  There is been no change in his balance.  His sensation in the foot is normal although it tingles.   Symptoms improved with sitting  Symptoms worsened with walking, sleeping, and bending.      INTERIM HISTORY:    After his 1 visit, I had advised discontinuing tizanidine and rechecking in 2 weeks.  This is his first follow-up since then.  No PT was ordered.  He did have a right T5-S1 TFESI by Dr. Drew on 10/15/2021 which I had ordered on 10/4/2021.  He had about 2 weeks worth of relief of the tingling down his leg with the shot.  That is come back and it is no better than it was before.  He said that the adjustment I did for his Pelvic Joint Dysfunction was \"amazing\" but only provided a day or 2 of relief.  He is not convinced that that is his current problem and he does not think I can adjust it again today.  He said he would like some pain medication because he cannot sleep.  He said in the past he has used Vicodin Percocets and muscle relaxants.    Past medical " history is reviewed and he was treated for left facial swelling and a tooth abscess in urgent care on 11/27/2021.  In addition, he describes an evaluation in October 2021 and it appears that he was being evaluated for alcohol use, as well as elevated liver enzymes and pancreatitis.  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  He moved here from [place] approximately 15 years ago.  He used to play football but not any longer.  He still does shoot around some basketballs and did that a few days ago.  He indicates he will drink a pint of vodka a day.    Review of Systems:   Pertinent negatives include no fevers, chills, unexplained weight loss, bowel incontinence, bladder incontinence, trips, stumbles, falls.  All others reviewed and are negative. See patient's intake questionnaire from today for pain diagram, and further details.     Imaging: I once again reviewed his images.  MR LUMBAR SPINE WITHOUT CONTRAST 8/26/2021   IMPRESSION:   1.  Multilevel degenerative disc disease is moderate at L5-S1 and more mild elsewhere. Accompanying small diffuse posterior disc bulges contribute to a multilevel mild spinal canal stenosis.   2.  Type I Modic endplate degenerative change at L5-S1 with smaller components of type I Modic endplate degenerative change anteriorly at L1-L2 and T11-T12. Additional degenerative osseous edema at the bilateral L3-L4 and left L4-L5 facets.   3.  Facet arthropathy is  most pronounced at the L3-L4 level, where it is moderate bilaterally.   4.  Moderate right and moderate-to-severe left L5-S1 neural foraminal stenosis with contact of the exiting left L5 nerve. Additional mild-to-moderate bilateral L4-L5 neural foraminal stenosis with mild-to-moderate left L3-L4 neural foraminal stenosis.   5.  Chronic compression fracture with mild height loss along the superior endplate of L1.   6.  Epidural lipomatosis throughout the lumbar spine with an accompanying thecal sac effacement as detailed above.    Objective:    CONSTITUTIONAL:  Vital signs as above.  No acute distress.  The patient is well nourished and well groomed.  He is moving about the room quite slowly and has a hard time removing his half zip outer sweater.  PSYCHIATRIC:  The patient is awake, alert, oriented to person, place and time.  The patient is answering questions appropriately with clear speech.  Normal affect.  Able to follow commands    RESPIRATORY:  normal respiratory excursion and no dyspnea.  MUSCULOSKELETAL:  Gait is non-antalgic.  He does have somewhat of a waddling gait however the patient is able to heel and toe walk without any difficulty.   Squat and rise slow and only jail limited by back pain  Back ROM: Globally limited with pain behavior.  No radiating symptoms..     NEUROLOGICAL:    Motor:  L3-S1 myotomes 5/5     Sensation to light touch is intact in the bilateral lower extremities.    SLR negative    Pelvis: Neutral alignment.  Long sitting test negative.  Standing flexion Brenda sign and stork test negative.    Assessment     Ed Rudolph  is a 61 year old y.o. male who presents today for follow-up regarding low back pain with right leg symptoms.  Nonreproducible radicular symptoms on exam today.  Short-term response to 1 right L5-S1 TF LESI on 10/15/2021.  Return response to previously noted Pelvic Joint Dysfunction not seen today.  Request for pain medication      Plan:  I do not see an  indication for narcotics at this point however I am going to give him an oral boost of prednisone 30 mg twice daily for 5 days and schedule him to talk to a neurosurgeon about surgical options.  No Pelvic Joint Dysfunction is present today amenable to treatment.  Neurologically he remains intact, however, and his pain complaints are disproportionate to his clinical findings.    Advised patient to call or return early if symptoms worsen, or having problems controlling bladder and bowel function or worsening leg weakness.     Please note: Voice recognition software was used in this dictation.  It may therefore contain typographical errors.  Sravan Kumar MD      Again, thank you for allowing me to participate in the care of your patient.        Sincerely,        Sravan Kumar MD

## 2021-12-08 NOTE — PATIENT INSTRUCTIONS
Ed, I am sorry you are not feeling better.  I am going to give you some potent steroid medication to see if we can settle the inflammation around your nerves down while we wait to get you in to talk to a surgeon about other options.  I hope this will help.  I do not see a Pelvic Joint Dysfunction today so that adjusting you like it did last time will not help.  I think you are correct about that.

## 2021-12-08 NOTE — PROGRESS NOTES
"  Subjective:  Ed Rudolph is a 61 year old male who presents today for follow-up regarding Pelvic Joint Dysfunction which on 9/14/2021 was a right posterior innominate rotation.  There was also a right L5 radicular pain pattern.    PRIOR HISTORY from 9/14/2021:  He presented with low back pain, onset was 3 months ago after he helped a friend move furniture and boxes.  The next day he was stiff in his low back and gradually over the next few days he started noticing right lateral calf pain.  That has progressed to some tingling in the lateral calf into all 5 toes of the foot.  He has not noticed weakness, bowel or bladder dysfunction, sexual dysfunction, or saddle anesthesia.  There is been no change in his balance.  His sensation in the foot is normal although it tingles.   Symptoms improved with sitting  Symptoms worsened with walking, sleeping, and bending.      INTERIM HISTORY:    After his 1 visit, I had advised discontinuing tizanidine and rechecking in 2 weeks.  This is his first follow-up since then.  No PT was ordered.  He did have a right T5-S1 TFESI by Dr. Drew on 10/15/2021 which I had ordered on 10/4/2021.  He had about 2 weeks worth of relief of the tingling down his leg with the shot.  That is come back and it is no better than it was before.  He said that the adjustment I did for his Pelvic Joint Dysfunction was \"amazing\" but only provided a day or 2 of relief.  He is not convinced that that is his current problem and he does not think I can adjust it again today.  He said he would like some pain medication because he cannot sleep.  He said in the past he has used Vicodin Percocets and muscle relaxants.    Past medical history is reviewed and he was treated for left facial swelling and a tooth abscess in urgent care on 11/27/2021.  In addition, he describes an evaluation in October 2021 and it appears that he was being evaluated for alcohol use, as well as elevated liver enzymes and " pancreatitis.  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  He moved here from [place] approximately 15 years ago.  He used to play football but not any longer.  He still does shoot around some basketballs and did that a few days ago.  He indicates he will drink a pint of vodka a day.    Review of Systems:   Pertinent negatives include no fevers, chills, unexplained weight loss, bowel incontinence, bladder incontinence, trips, stumbles, falls.  All others reviewed and are negative. See patient's intake questionnaire from today for pain diagram, and further details.     Imaging: I once again reviewed his images.  MR LUMBAR SPINE WITHOUT CONTRAST 8/26/2021   IMPRESSION:   1.  Multilevel degenerative disc disease is moderate at L5-S1 and more mild elsewhere. Accompanying small diffuse posterior disc bulges contribute to a multilevel mild spinal canal stenosis.   2.  Type I Modic endplate degenerative change at L5-S1 with smaller components of type I Modic endplate degenerative change anteriorly at L1-L2 and T11-T12. Additional degenerative osseous edema at the bilateral L3-L4 and left L4-L5 facets.   3.  Facet arthropathy is most pronounced at the L3-L4 level, where it is moderate bilaterally.   4.  Moderate right and moderate-to-severe left L5-S1 neural foraminal stenosis with contact of the exiting left L5 nerve. Additional mild-to-moderate bilateral L4-L5 neural foraminal stenosis with  mild-to-moderate left L3-L4 neural foraminal stenosis.   5.  Chronic compression fracture with mild height loss along the superior endplate of L1.   6.  Epidural lipomatosis throughout the lumbar spine with an accompanying thecal sac effacement as detailed above.    Objective:    CONSTITUTIONAL:  Vital signs as above.  No acute distress.  The patient is well nourished and well groomed.  He is moving about the room quite slowly and has a hard time removing his half zip outer sweater.  PSYCHIATRIC:  The patient is awake, alert, oriented to person, place and time.  The patient is answering questions appropriately with clear speech.  Normal affect.  Able to follow commands    RESPIRATORY:  normal respiratory excursion and no dyspnea.  MUSCULOSKELETAL:  Gait is non-antalgic.  He does have somewhat of a waddling gait however the patient is able to heel and toe walk without any difficulty.   Squat and rise slow and only USP limited by back pain  Back ROM: Globally limited with pain behavior.  No radiating symptoms..     NEUROLOGICAL:    Motor:  L3-S1 myotomes 5/5     Sensation to light touch is intact in the bilateral lower extremities.    SLR negative    Pelvis: Neutral alignment.  Long sitting test negative.  Standing flexion Brenda sign and stork test negative.    Assessment     Ed Rudolph  is a 61 year old y.o. male who presents today for follow-up regarding low back pain with right leg symptoms.  Nonreproducible radicular symptoms on exam today.  Short-term response to 1 right L5-S1 TF LESI on 10/15/2021.  Return response to previously noted Pelvic Joint Dysfunction not seen today.  Request for pain medication      Plan:  I do not see an indication for narcotics at this point however I am going to give him an oral boost of prednisone 30 mg twice daily for 5 days and schedule him to talk to a neurosurgeon about surgical options.  No Pelvic Joint Dysfunction is present today amenable to treatment.   Neurologically he remains intact, however, and his pain complaints are disproportionate to his clinical findings.    Advised patient to call or return early if symptoms worsen, or having problems controlling bladder and bowel function or worsening leg weakness.     Please note: Voice recognition software was used in this dictation.  It may therefore contain typographical errors.  Sravan Kumar MD

## 2021-12-29 DIAGNOSIS — E78.5 HYPERLIPIDEMIA LDL GOAL <130: ICD-10-CM

## 2021-12-29 DIAGNOSIS — R73.01 IMPAIRED FASTING GLUCOSE: Primary | ICD-10-CM

## 2021-12-31 NOTE — TELEPHONE ENCOUNTER
Routing refill request to provider for review/approval because:  Labs not current:    LDL Cholesterol Calculated   Date Value Ref Range Status   10/15/2020 74 <100 mg/dL Final     Comment:     Desirable:       <100 mg/dl

## 2022-01-03 ENCOUNTER — LAB (OUTPATIENT)
Dept: LAB | Facility: CLINIC | Age: 62
End: 2022-01-03
Payer: COMMERCIAL

## 2022-01-03 DIAGNOSIS — E78.1 HIGH TRIGLYCERIDES: ICD-10-CM

## 2022-01-03 DIAGNOSIS — I10 ESSENTIAL HYPERTENSION WITH GOAL BLOOD PRESSURE LESS THAN 140/90: ICD-10-CM

## 2022-01-03 DIAGNOSIS — Z13.220 SCREENING FOR HYPERLIPIDEMIA: ICD-10-CM

## 2022-01-03 LAB
ANION GAP SERPL CALCULATED.3IONS-SCNC: 7 MMOL/L (ref 3–14)
BUN SERPL-MCNC: 15 MG/DL (ref 7–30)
CALCIUM SERPL-MCNC: 9.8 MG/DL (ref 8.5–10.1)
CHLORIDE BLD-SCNC: 101 MMOL/L (ref 94–109)
CHOLEST SERPL-MCNC: 247 MG/DL
CO2 SERPL-SCNC: 29 MMOL/L (ref 20–32)
CREAT SERPL-MCNC: 0.77 MG/DL (ref 0.66–1.25)
FASTING STATUS PATIENT QL REPORTED: YES
GFR SERPL CREATININE-BSD FRML MDRD: >90 ML/MIN/1.73M2
GLUCOSE BLD-MCNC: 128 MG/DL (ref 70–99)
HDLC SERPL-MCNC: 58 MG/DL
LDLC SERPL CALC-MCNC: 148 MG/DL
NONHDLC SERPL-MCNC: 189 MG/DL
POTASSIUM BLD-SCNC: 3.7 MMOL/L (ref 3.4–5.3)
SODIUM SERPL-SCNC: 137 MMOL/L (ref 133–144)
TRIGL SERPL-MCNC: 205 MG/DL

## 2022-01-03 PROCEDURE — 80048 BASIC METABOLIC PNL TOTAL CA: CPT

## 2022-01-03 PROCEDURE — 36415 COLL VENOUS BLD VENIPUNCTURE: CPT

## 2022-01-03 PROCEDURE — 80061 LIPID PANEL: CPT

## 2022-01-06 ENCOUNTER — OFFICE VISIT (OUTPATIENT)
Dept: NEUROSURGERY | Facility: CLINIC | Age: 62
End: 2022-01-06
Attending: PREVENTIVE MEDICINE
Payer: COMMERCIAL

## 2022-01-06 ENCOUNTER — TELEPHONE (OUTPATIENT)
Dept: FAMILY MEDICINE | Facility: CLINIC | Age: 62
End: 2022-01-06

## 2022-01-06 VITALS
HEART RATE: 92 BPM | DIASTOLIC BLOOD PRESSURE: 67 MMHG | HEIGHT: 70 IN | SYSTOLIC BLOOD PRESSURE: 114 MMHG | WEIGHT: 187 LBS | BODY MASS INDEX: 26.77 KG/M2

## 2022-01-06 DIAGNOSIS — M54.16 LUMBAR RADICULOPATHY: ICD-10-CM

## 2022-01-06 DIAGNOSIS — M99.04 SACROILIAC JOINT SOMATIC DYSFUNCTION: ICD-10-CM

## 2022-01-06 DIAGNOSIS — M54.2 NECK PAIN: Primary | ICD-10-CM

## 2022-01-06 DIAGNOSIS — G89.29 CHRONIC MIDLINE LOW BACK PAIN WITHOUT SCIATICA: ICD-10-CM

## 2022-01-06 DIAGNOSIS — M54.50 CHRONIC MIDLINE LOW BACK PAIN WITHOUT SCIATICA: ICD-10-CM

## 2022-01-06 PROCEDURE — 99204 OFFICE O/P NEW MOD 45 MIN: CPT | Performed by: PHYSICIAN ASSISTANT

## 2022-01-06 ASSESSMENT — PAIN SCALES - GENERAL: PAINLEVEL: SEVERE PAIN (7)

## 2022-01-06 ASSESSMENT — MIFFLIN-ST. JEOR: SCORE: 1656.54

## 2022-01-06 NOTE — LETTER
1/6/2022         RE: Ed Rudolph  [address]  [place] [address]        Dear Colleague,    Thank you for referring your patient, Ed Rudolph, to the [facility] [place]. Please see a copy of my visit note below.    Neurosurgery Consult    HPI    Mr. Rudolph is a 61-year-old male who presents to clinic for evaluation of neck and back pain without significant radicular symptoms at this time.  He has been evaluated by physical medicine, and underwent a pelvic adjustment as well as a right L5-S1 TFESI he had brief relief from both of these interventions.  He is not doing physical therapy yet.  He is considering joining a gym.  He reports he is a heavy drinker and drinks daily.    Medical history  Alcohol dependence  Lumbar radiculitis  Hyperlipidemia  Hypertension      Social history  Alcohol dependence  Works at [employer]      B/P: 114/67, T: Data Unavailable, P: 92, R: Data Unavailable       Exam    Alert and oriented no acute distress  Bilateral lower extremities with 5/5 strength  Reflexes 2+ patella  Negative straight leg raise bilaterally  Negative ankle clonus   Lumbar spine nontender to palpation  Able to stand on heels and toes on the left, unable to plantarflex while standing on the right    Gait is normal    Right shoulder with positive impingement sign  Positive pain with palpation of the lateral and anterior glenohumeral joint  Pain with passive range of motion particular with abduction    Imaging    Lumbar MRI demonstrates epidural lipomatosis, multilevel diffuse degenerative changes, moderate right and moderate to severe left L5-S1 neuroforaminal stenosis  Chronic L1 compression fracture.    Assessment    Chronic low back pain  Chronic neck pain  Right shoulder pain    Plan:      I recommend the patient begin with physical therapy at this time for evaluation of his shoulder and his low back.  I would not recommend any surgical interventions  in his lumbar spine given that he has minimal radicular symptoms and primarily low back pain.  If his symptoms do not improve with physical therapy may want to consider other procedures such as facet rhizotomy in his low back.  If his shoulder and neck symptoms continue to bother him we could consider a cervical MRI as well.    Total time of 45 minutes spent with the patient today in counseling and coordination of care.      Again, thank you for allowing me to participate in the care of your patient.        Sincerely,        Criss Valles PA-C

## 2022-01-06 NOTE — TELEPHONE ENCOUNTER
Patient calling.    1.   He is asking about his recent lab results from 1/3/22. Had BMP and Lipid panel and interpretation.     Recent Labs   Lab Test 01/03/22  1012 10/15/20  1834 09/15/17  1620 02/10/15  0844 04/16/14  1639   CHOL 247* 167   < > 229* 221*   HDL 58 67   < > 53 46   * 74   < > 131* 116   TRIG 205* 131   < > 227* 297*   CHOLHDLRATIO  --   --   --  4.3 4.8    < > = values in this interval not displayed.     2.  Patient is also asking for follow up on liver function.  He had been seen by Hematology Oncology 4/2/2019     Transferred to scheduling to schedule follow up with Dr. Garcia as it has been since since he was last seen 4/21/2020.  Also advised to schedule a physical.    Jane Angeles RN, [facility]

## 2022-01-06 NOTE — PROGRESS NOTES
Neurosurgery Consult    HPI    Mr. Rudolph is a 61-year-old male who presents to clinic for evaluation of neck and back pain without significant radicular symptoms at this time.  He has been evaluated by physical medicine, and underwent a pelvic adjustment as well as a right L5-S1 TFESI he had brief relief from both of these interventions.  He is not doing physical therapy yet.  He is considering joining a gym.  He reports he is a heavy drinker and drinks daily.    Medical history  Alcohol dependence  Lumbar radiculitis  Hyperlipidemia  Hypertension      Social history  Alcohol dependence  Works at [employer]      B/P: 114/67, T: Data Unavailable, P: 92, R: Data Unavailable       Exam    Alert and oriented no acute distress  Bilateral lower extremities with 5/5 strength  Reflexes 2+ patella  Negative straight leg raise bilaterally  Negative ankle clonus   Lumbar spine nontender to palpation  Able to stand on heels and toes on the left, unable to plantarflex while standing on the right    Gait is normal    Right shoulder with positive impingement sign  Positive pain with palpation of the lateral and anterior glenohumeral joint  Pain with passive range of motion particular with abduction    Imaging    Lumbar MRI demonstrates epidural lipomatosis, multilevel diffuse degenerative changes, moderate right and moderate to severe left L5-S1 neuroforaminal stenosis  Chronic L1 compression fracture.    Assessment    Chronic low back pain  Chronic neck pain  Right shoulder pain    Plan:      I recommend the patient begin with physical therapy at this time for evaluation of his shoulder and his low back.  I would not recommend any surgical interventions in his lumbar spine given that he has minimal radicular symptoms and primarily low back pain.  If his symptoms do not improve with physical therapy may want to consider other procedures such as facet rhizotomy in his low back.  If his shoulder and neck symptoms continue to  bother him we could consider a cervical MRI as well.    Total time of 45 minutes spent with the patient today in counseling and coordination of care.

## 2022-01-06 NOTE — NURSING NOTE
"Ed Rudolph's goals for this visit include: consult  He requests these members of his care team be copied on today's visit information:     PCP: Aris Garcia    Referring Provider:  Sravan Kumar MD  PHYSICAL MEDICINE & REHABILITATION  [address] [place] [address]    /67   Pulse 92   Ht 1.765 m (5' 9.5\")   Wt 84.8 kg (187 lb)   BMI 27.22 kg/m      Do you need any medication refills at today's visit? n  "

## 2022-01-09 ENCOUNTER — HEALTH MAINTENANCE LETTER (OUTPATIENT)
Age: 62
End: 2022-01-09

## 2022-01-15 RX ORDER — SIMVASTATIN 40 MG
TABLET ORAL
Qty: 90 TABLET | Refills: 1 | Status: SHIPPED | OUTPATIENT
Start: 2022-01-15 | End: 2024-08-29 | Stop reason: ALTCHOICE

## 2022-01-28 ENCOUNTER — OFFICE VISIT (OUTPATIENT)
Dept: FAMILY MEDICINE | Facility: CLINIC | Age: 62
End: 2022-01-28
Payer: COMMERCIAL

## 2022-01-28 VITALS
OXYGEN SATURATION: 96 % | TEMPERATURE: 98.8 F | SYSTOLIC BLOOD PRESSURE: 108 MMHG | WEIGHT: 179 LBS | BODY MASS INDEX: 25.62 KG/M2 | HEIGHT: 70 IN | RESPIRATION RATE: 20 BRPM | DIASTOLIC BLOOD PRESSURE: 77 MMHG | HEART RATE: 89 BPM

## 2022-01-28 DIAGNOSIS — I10 ESSENTIAL HYPERTENSION WITH GOAL BLOOD PRESSURE LESS THAN 140/90: ICD-10-CM

## 2022-01-28 DIAGNOSIS — F32.5 DEPRESSION, MAJOR, IN REMISSION (H): ICD-10-CM

## 2022-01-28 DIAGNOSIS — Z86.16 HISTORY OF COVID-19: ICD-10-CM

## 2022-01-28 DIAGNOSIS — J12.82 PNEUMONIA DUE TO COVID-19 VIRUS: Primary | ICD-10-CM

## 2022-01-28 DIAGNOSIS — F10.20 ALCOHOL USE DISORDER, SEVERE, DEPENDENCE (H): ICD-10-CM

## 2022-01-28 DIAGNOSIS — U07.1 PNEUMONIA DUE TO COVID-19 VIRUS: Primary | ICD-10-CM

## 2022-01-28 PROCEDURE — 99214 OFFICE O/P EST MOD 30 MIN: CPT | Performed by: PREVENTIVE MEDICINE

## 2022-01-28 ASSESSMENT — PAIN SCALES - GENERAL: PAINLEVEL: NO PAIN (0)

## 2022-01-28 ASSESSMENT — MIFFLIN-ST. JEOR: SCORE: 1620.25

## 2022-01-28 NOTE — PROGRESS NOTES
"  Assessment & Plan     Pneumonia due to COVID-19 virus  -symptoms are much better  -completed 10 days of antibiotics  -outside records reviewed     History of COVID-19  -tested positive on 1/13/22  -completed 5 days of monoclonal antibodies   -OK to discontinue isolation, patient has been able to return to work    Alcohol use disorder, severe, dependence (H)  -has cut down alcohol use     Depression, major, in remission (H)  -not on medication for 4 years     Essential hypertension with goal blood pressure less than 140/90  -stable      Review of external notes as documented elsewhere in note  20 minutes spent on the date of the encounter doing chart review, history and exam, documentation and further activities per the note       BMI:   Estimated body mass index is 26.05 kg/m  as calculated from the following:    Height as of this encounter: 1.765 m (5' 9.5\").    Weight as of this encounter: 81.2 kg (179 lb).     Return in about 6 months (around 7/28/2022) for Follow up, with any available provider.    Vicki Dobbs MD MPH  [facility]    Brian Ward is a 61 year old who presents for the following health issues post covid followup    HPI     75% better  Not dehydrated  No chest Pain  No wheezing  No SOB  No emesis  No fever  No chills  Has been taking medication as prescribed, last day of 10 days course of antibiotics  Completed course of Molnupiravir for 5 days     Not on Prozac for 4 years  Has cut down on alcohol use.   No tobacco use     Patient was seen in the emergency room on 1/13/22:    \"MDM:   Ed Rudolph is a 61 y.o. male who is coming in for fevers and chills that he has been endorsing for the past few days. He has a cough and chest pain when coughing. Differential includes viral URI vs Covid vs pneumonia vs other. On arrival, patient is febrile, he is not tachycardic or hypoxic. We did do an ambulatory walking desat study and he doesn't get hypoxic with walking " "either. He does have some chest pain with coughing. His EKG does not appear consistent elevation MI, and his troponin is less than three ruling out ACS. Given his infectious symptoms, I would be more concerned with myocarditis but this rules this out as well. Concerned with the possibility of PE but again he only has pain when he is coughing otherwise does not have sharp pain with breathing in and does not have shortness of breath or pain while lying down flat. Given that his pain is only with coughing. At this point in time I am not concerned for PE so I did not order a DDimer especially considering this is COVID. This is only the second day of his symptoms. I would be more concerned for PE if this was occurring after at least of week and a half after symptoms. His vitals are also normal which I think is quite reassuring against PE. His has no leukocytosis, and has some baseline anemia, and no significant electrolyte abnormalities. His chest xray does show this subtle ill-defined opacity by the left base. I was concerned that this would represent an early pneumonia given his fevers, chills, coughs, and such. I also added a procalcitonin which did come back at 0.3, so I do think that we should treat him for pneumonia for the community acquired pneumonia module. We will treat with Zithromycin and Ceftin. If he has any worsening symptoms, I want him to return. He was discharged with COVID precautions and he is interested in anti-virals, so we did put him to be put into considerations if he has COVID. \"          Concern for COVID-19  About how many days ago did these symptoms start? 17 days ago  Is this your first visit for this illness?went to [facility] er on 1/13/2022  In the 14 days before your symptoms started, have you had close contact with someone with COVID-19 (Coronavirus)? No  Do you have a fever or chills? No  Are you having new or worsening difficulty breathing? No  Do you have new or worsening cough? " "No  Have you had any new or unexplained body aches? No    Have you experienced any of the following NEW symptoms?    Headache: No    Sore throat: No    Loss of taste or smell: No    Chest pain: No    Diarrhea: No    Rash: No  What treatments have you tried? Antibiotics, cough suppressant  Who do you live with? Lives alone  Are you, or a household member, a healthcare worker or a ? No  Do you live in a nursing home, group home, or shelter? No        Hypertension Follow-up      Do you check your blood pressure regularly outside of the clinic? No     Are you following a low salt diet? Yes    Are your blood pressures ever more than 140 on the top number (systolic) OR more   than 90 on the bottom number (diastolic), for example 140/90? No          Review of Systems   Constitutional, HEENT, cardiovascular, pulmonary, gi and gu systems are negative, except as otherwise noted.      Objective    /77 (BP Location: Right arm, Patient Position: Sitting, Cuff Size: Adult Regular)   Pulse 89   Temp 98.8  F (37.1  C) (Oral)   Resp 20   Ht 1.765 m (5' 9.5\")   Wt 81.2 kg (179 lb)   SpO2 96%   BMI 26.05 kg/m    Body mass index is 26.05 kg/m .  Physical Exam   GENERAL APPEARANCE: healthy, alert and no distress  EYES: Eyes grossly normal to inspection and conjunctivae and sclerae normal  NECK: no adenopathy and trachea midline and normal to palpation  RESP: lungs clear to auscultation - no rales, rhonchi or wheezes  CV: regular rates and rhythm, normal S1 S2  ABDOMEN: soft, non-tender and no rebound or guarding   MS: extremities normal- no gross deformities noted  SKIN: no suspicious lesions or rashes  NEURO: Normal strength and tone, mentation intact and speech normal  PSYCH: mentation appears normal      No results found for any visits on 01/28/22.          "

## 2022-03-26 DIAGNOSIS — F33.0 MAJOR DEPRESSIVE DISORDER, RECURRENT EPISODE, MILD (H): ICD-10-CM

## 2022-03-26 DIAGNOSIS — F51.01 PRIMARY INSOMNIA: ICD-10-CM

## 2022-03-30 RX ORDER — TRAZODONE HYDROCHLORIDE 50 MG/1
TABLET, FILM COATED ORAL
Qty: 90 TABLET | Refills: 1 | Status: SHIPPED | OUTPATIENT
Start: 2022-03-30 | End: 2023-01-17

## 2022-04-07 ENCOUNTER — OFFICE VISIT (OUTPATIENT)
Dept: URGENT CARE | Facility: URGENT CARE | Age: 62
End: 2022-04-07
Payer: COMMERCIAL

## 2022-04-07 VITALS
RESPIRATION RATE: 20 BRPM | BODY MASS INDEX: 25.88 KG/M2 | DIASTOLIC BLOOD PRESSURE: 74 MMHG | SYSTOLIC BLOOD PRESSURE: 110 MMHG | TEMPERATURE: 98 F | HEART RATE: 106 BPM | WEIGHT: 177.8 LBS | OXYGEN SATURATION: 99 %

## 2022-04-07 DIAGNOSIS — R22.0 LEFT FACIAL SWELLING: Primary | ICD-10-CM

## 2022-04-07 PROCEDURE — 99213 OFFICE O/P EST LOW 20 MIN: CPT | Performed by: PHYSICIAN ASSISTANT

## 2022-04-07 ASSESSMENT — ENCOUNTER SYMPTOMS
DYSURIA: 0
COUGH: 0
CARDIOVASCULAR NEGATIVE: 1
CHEST TIGHTNESS: 0
HEMATURIA: 0
PALPITATIONS: 0
NEUROLOGICAL NEGATIVE: 1
CHILLS: 0
FREQUENCY: 0
MYALGIAS: 0
FEVER: 0
GASTROINTESTINAL NEGATIVE: 1
ABDOMINAL PAIN: 0
NAUSEA: 0
DIARRHEA: 0
SHORTNESS OF BREATH: 0
RESPIRATORY NEGATIVE: 1
WHEEZING: 0
SINUS PAIN: 0
VOMITING: 0
SORE THROAT: 0
CONSTITUTIONAL NEGATIVE: 1
HEADACHES: 0
FACIAL SWELLING: 1
MUSCULOSKELETAL NEGATIVE: 1
ALLERGIC/IMMUNOLOGIC NEGATIVE: 1
SINUS PRESSURE: 0

## 2022-04-07 ASSESSMENT — PAIN SCALES - GENERAL: PAINLEVEL: NO PAIN (0)

## 2022-04-07 NOTE — PROGRESS NOTES
Chief Complaint:    Chief Complaint   Patient presents with     Facial Swelling     facial swelling in lower part of face since yesterday, denies tooth pain (this is not the first time, had  it last time and gave Augmentin and it worked)         Medical Decision Making:    Vital signs reviewed by Shane Ho PA-C  /74   Pulse 106   Temp 98  F (36.7  C) (Tympanic)   Resp 20   Wt 80.6 kg (177 lb 12.8 oz)   SpO2 99%   BMI 25.88 kg/m      Differential Diagnosis:  Facial swelling, dental abscess.     ASSESSMENT:     1. Left facial swelling       PLAN:     Rx for Augmentin sent in as this has worked well in the past.    Patient instructed to follow up with PCP in 1 week if symptoms are not improving.  Sooner if symptoms worsen.  Worrisome symptoms discussed with instructions to go to the ED.  Patient verbalized understanding and agreed with this plan.    Labs:     No results found for any visits on 04/07/22.    Current Meds:    Current Outpatient Medications:      acetaminophen (TYLENOL) 500 MG tablet, Take 500-1,000 mg by mouth every 6 hours as needed for mild pain, Disp: , Rfl:      amoxicillin-clavulanate (AUGMENTIN) 875-125 MG tablet, Take 1 tablet by mouth 2 times daily for 10 days, Disp: 20 tablet, Rfl: 0     lisinopril-hydrochlorothiazide (ZESTORETIC) 20-25 MG tablet, TAKE 1 TABLET BY MOUTH DAILY FOR BLOOD PRESSURE, Disp: 90 tablet, Rfl: 3     Multiple Vitamin (DAILY VITAMINS PO), Take 1 tablet by mouth daily , Disp: , Rfl:      simvastatin (ZOCOR) 40 MG tablet, TAKE 1 TABLET(40 MG) BY MOUTH EVERY EVENING FOR CHOLESTEROL, Disp: 90 tablet, Rfl: 1     traZODone (DESYREL) 50 MG tablet, TAKE 1 TABLET(50 MG) BY MOUTH AT BEDTIME FOR DEPRESSION OR SLEEP, Disp: 90 tablet, Rfl: 1     amoxicillin-clavulanate (AUGMENTIN) 875-125 MG tablet, Take 1 tablet by mouth 2 times daily (Patient not taking: Reported on 1/28/2022), Disp: 14 tablet, Rfl: 0     tiZANidine (ZANAFLEX) 2 MG tablet, Take 1 tablet (2 mg) by  mouth 3 times daily as needed for muscle spasms (Patient not taking: Reported on 1/28/2022), Disp: 30 tablet, Rfl: 0    Allergies:  No Known Allergies    SUBJECTIVE    HPI: Ed Rudolph is an 61 year old male who presents for evaluation and treatment of L sided facial swelling.  Symptoms started yesterday and has worsened.  He has had episodes of this in the past and Augmentin has worked well.  He denies any tooth pain.  No difficulties chewing or swallowing.  No fever or chills.      ROS:      Review of Systems   Constitutional: Negative.  Negative for chills and fever.   HENT: Positive for facial swelling. Negative for dental problem, ear pain, sinus pressure, sinus pain and sore throat.    Respiratory: Negative.  Negative for cough, chest tightness, shortness of breath and wheezing.    Cardiovascular: Negative.  Negative for chest pain and palpitations.   Gastrointestinal: Negative.  Negative for abdominal pain, diarrhea, nausea and vomiting.   Genitourinary: Negative for dysuria, frequency, hematuria and urgency.   Musculoskeletal: Negative.  Negative for myalgias.   Skin: Negative for rash.   Allergic/Immunologic: Negative.  Negative for immunocompromised state.   Neurological: Negative.  Negative for headaches.        Family History   Family History   Adopted: Yes   Problem Relation Age of Onset     Family History Negative No family hx of         Unknown, adopted       Social History  Social History     Socioeconomic History     Marital status: Single     Spouse name: Not on file     Number of children: 0     Years of education: Not on file     Highest education level: Not on file   Occupational History     Occupation:      Employer: [employer]   Tobacco Use     Smoking status: Never Smoker     Smokeless tobacco: Never Used   Substance and Sexual Activity     Alcohol use: Yes     Alcohol/week: 14.0 standard drinks     Types: 14 Standard drinks or equivalent per week     Comment: on  average, 2-3 vodka cranberries per day     Drug use: No     Sexual activity: Yes     Partners: Female   Other Topics Concern      Service Not Asked     Blood Transfusions Not Asked     Caffeine Concern Yes     Comment: Coffee 2 cups per day     Occupational Exposure Not Asked     Hobby Hazards Not Asked     Sleep Concern Not Asked     Stress Concern Not Asked     Weight Concern Not Asked     Special Diet Not Asked     Back Care Not Asked     Exercise Yes     Comment: 3     Bike Helmet Not Asked     Seat Belt Not Asked     Self-Exams Not Asked   Social History Narrative     Not on file     Social Determinants of Health     Financial Resource Strain: Not on file   Food Insecurity: Not on file   Transportation Needs: Not on file   Physical Activity: Not on file   Stress: Not on file   Social Connections: Not on file   Intimate Partner Violence: Not on file   Housing Stability: Not on file        Surgical History:  Past Surgical History:   Procedure Laterality Date     COLONOSCOPY WITH CO2 INSUFFLATION N/A 4/12/2018    Procedure: COLONOSCOPY WITH CO2 INSUFFLATION;  COLON SCREEN/ ARREDONDO;  Surgeon: Quintin Patton MD;  Location: [facility] OR     CRANIOTOMY  01/2003    x 2, for treatment of brain abscess     INJECT EPIDURAL TRANSFORAMINAL Right 10/15/2021    Procedure: INJECTION, EPIDURAL, TRANSFORAMINAL APPROACH - Right L5-S1 TFESI;  Surgeon: Juan Drew MD;  Location: [facility] OR        Problem List:  Patient Active Problem List   Diagnosis     Erectile Dysfunction     Overweight (BMI 25.0-29.9)     Hyperlipidemia LDL goal <100     Essential hypertension with goal blood pressure less than 140/90     Impaired fasting glucose     High triglycerides     Prostatic nodule     Primary insomnia     Skin cyst     Elevated ferritin     Keloid scar, right neck     Fall     Major depressive disorder, recurrent episode (H)     Slac (scapholunate advanced collapse) of wrist, left     Alcohol dependence (H)     Lumbar  radiculitis           OBJECTIVE:     Vital signs noted and reviewed by Shane Ho PA-C  /74   Pulse 106   Temp 98  F (36.7  C) (Tympanic)   Resp 20   Wt 80.6 kg (177 lb 12.8 oz)   SpO2 99%   BMI 25.88 kg/m       PEFR:    Physical Exam  Vitals and nursing note reviewed.   Constitutional:       General: He is not in acute distress.     Appearance: He is well-developed. He is not ill-appearing, toxic-appearing or diaphoretic.   HENT:      Head: Normocephalic and atraumatic.      Jaw: Swelling present. No tenderness, pain on movement or malocclusion.        Right Ear: Hearing, tympanic membrane, ear canal and external ear normal. Tympanic membrane is not perforated, erythematous, retracted or bulging.      Left Ear: Hearing, tympanic membrane, ear canal and external ear normal. Tympanic membrane is not perforated, erythematous, retracted or bulging.      Nose: Nose normal. No mucosal edema, congestion or rhinorrhea.      Mouth/Throat:      Pharynx: No oropharyngeal exudate or posterior oropharyngeal erythema.      Tonsils: No tonsillar exudate or tonsillar abscesses. 0 on the right. 0 on the left.   Eyes:      Pupils: Pupils are equal, round, and reactive to light.   Cardiovascular:      Rate and Rhythm: Normal rate and regular rhythm.      Heart sounds: Normal heart sounds, S1 normal and S2 normal. Heart sounds not distant. No murmur heard.    No friction rub. No gallop.   Pulmonary:      Effort: Pulmonary effort is normal. No respiratory distress.      Breath sounds: Normal breath sounds. No decreased breath sounds, wheezing, rhonchi or rales.   Abdominal:      General: Bowel sounds are normal. There is no distension.      Palpations: Abdomen is soft.      Tenderness: There is no abdominal tenderness.   Musculoskeletal:      Cervical back: Normal range of motion and neck supple.   Lymphadenopathy:      Cervical: No cervical adenopathy.   Skin:     General: Skin is warm and dry.      Findings: No  rash.   Neurological:      Mental Status: He is alert.      Cranial Nerves: No cranial nerve deficit.   Psychiatric:         Attention and Perception: He is attentive.         Speech: Speech normal.         Behavior: Behavior normal. Behavior is cooperative.         Thought Content: Thought content normal.         Judgment: Judgment normal.             Shane Ho PA-C  4/7/2022, 10:36 AM

## 2022-05-03 ENCOUNTER — TELEPHONE (OUTPATIENT)
Dept: FAMILY MEDICINE | Facility: CLINIC | Age: 62
End: 2022-05-03
Payer: COMMERCIAL

## 2022-05-03 NOTE — TELEPHONE ENCOUNTER
Reason for Call: Request for an order or referral:    Order or referral being requested: Referral for spinal injection with Dr. Chambers in orthopedic. Please fax referral to: [phone].    Date needed: at your convenience    Has the patient been seen by the PCP for this problem? NO    Additional comments: no    Phone number Patient can be reached at:  Home number on file [phone] (home)    Best Time:  anytime    Can we leave a detailed message on this number?  YES    Call taken on 5/3/2022 at 8:57 AM by Deb Smart

## 2022-05-06 NOTE — TELEPHONE ENCOUNTER
Pt calling back again to check on status of referral request below.    Pt states he received the requested spinal injection approximately 3 months ago. Pt states he needs a new referral/order faxed to [phone].    Pt requests please return call to [phone] when the order is entered so he can call to schedule it.    Jane Keita, LYNDSEYN, RN

## 2022-05-11 NOTE — TELEPHONE ENCOUNTER
Called and spoke to the patient and scheduled a Telephone visit with Dr Garcia for 5/12/2022.  Jodi Hampton MA  [facility]  2nd Floor  Primary Care

## 2022-05-11 NOTE — TELEPHONE ENCOUNTER
Patient hasn't seen dr. Garcia for this condition.  He could request to referral from dr. Kumar who he just saw in Dec. Or could do a virtual visit for this referral if wants it to come from primary provider.    Shea Burrell M.D.

## 2022-05-12 NOTE — PATIENT INSTRUCTIONS
At [facility], we strive to deliver an exceptional experience to you, every time we see you. If you receive a survey, please complete it as we do value your feedback.  If you have MyChart, you can expect to receive results automatically within 24 hours of their completion.  Your provider will send a note interpreting your results as well.   If you do not have MyChart, you should receive your results in about a week by mail.    Your care team:                            Family Medicine Internal Medicine   MD Marquez Dunaway MD Shantel Branch-Fleming, MD Srinivasa Vaka, MD Katya Belousova, VANGIE Massey CNP, MD ([facility]   Nicolas Vallejo, MD Yvette Pope MD Amelia Massimini APRN DAVID Blanco APRN DAVID Castle MD             Clinic hours: Monday - Thursday 7 am-6 pm; Fridays 7 am-5 pm.   Urgent care: Monday - Friday 10 am- 8 pm; Saturday and Sunday 9 am-5 pm.    Clinic: [phone]       [place] [facility]: Monday - Thursday 8 am - 7 pm; Friday 8 am - 6 pm  [facility]: [phone]        Please call [facility]: [phone] to schedule your IR guided epidural steroid injection at the [place] office.

## 2022-05-12 NOTE — PROGRESS NOTES
Ed is a 62 year old who is being evaluated via a billable telephone visit.      What phone number would you like to be contacted at? [phone]  How would you like to obtain your AVS? MyChart    Assessment & Plan     (M99.04) Sacroiliac joint somatic dysfunction  (M54.16) Lumbar radiculopathy  Comment: Recurrence, 7 months ago successfully treated (temporarily) by steroid injection.  He also requests a refill of prednisone as a backup, as this did help somewhat previously  Plan: XR Lumbar Epidural Injection Incl Imaging,         Radiology Referral, predniSONE (DELTASONE) 10         MG tablet                21 minutes spent on the date of the encounter doing chart review, patient visit and documentation          Aris Garcia MD  [facility]   Ed is a 62 year old who presents for the following health issues     History of Present Illness       Reason for visit:  Cortisone request    He eats 2-3 servings of fruits and vegetables daily.He consumes 4 sweetened beverage(s) daily.He exercises with enough effort to increase his heart rate 30 to 60 minutes per day.  He exercises with enough effort to increase his heart rate 5 days per week.   He is taking medications regularly.           Back Pain       Duration: A couple months        Specific cause: Nothing new    Description:   Location of pain: low back right  Character of pain:   Pain radiation:radiates into the right leg  New numbness or weakness in legs, not attributed to pain:  no     Intensity: severe    History:   Pain interferes with job:   History of back problems: previous degenerative joint disease of the lumbar spine and previous steroid injection about 7 months ago  Any previous MRI or X-rays: Yes  Sees a specialist for back pain:  Yes, Dr. Kumar, no contact or appointment made at this time                 Review of Systems         Objective           Vitals:  No vitals were obtained today due to virtual  visit.    Physical Exam   healthy, alert and no distress  PSYCH: Alert and oriented times 3; coherent speech, normal   rate and volume, able to articulate logical thoughts, able   to abstract reason, no tangential thoughts, no hallucinations   or delusions  His affect is normal and pleasant  RESP: No cough, no audible wheezing, able to talk in full sentences  Remainder of exam unable to be completed due to telephone visits                Phone call duration: 7 minutes

## 2022-05-13 ENCOUNTER — VIRTUAL VISIT (OUTPATIENT)
Dept: FAMILY MEDICINE | Facility: CLINIC | Age: 62
End: 2022-05-13
Payer: COMMERCIAL

## 2022-05-13 DIAGNOSIS — M99.04 SACROILIAC JOINT SOMATIC DYSFUNCTION: ICD-10-CM

## 2022-05-13 DIAGNOSIS — M54.16 LUMBAR RADICULOPATHY: ICD-10-CM

## 2022-05-13 PROCEDURE — 99213 OFFICE O/P EST LOW 20 MIN: CPT | Mod: 95 | Performed by: FAMILY MEDICINE

## 2022-05-13 RX ORDER — PREDNISONE 10 MG/1
30 TABLET ORAL 2 TIMES DAILY
Qty: 30 TABLET | Refills: 0 | Status: SHIPPED | OUTPATIENT
Start: 2022-05-13 | End: 2023-01-17

## 2022-05-20 ENCOUNTER — ANCILLARY PROCEDURE (OUTPATIENT)
Dept: GENERAL RADIOLOGY | Facility: CLINIC | Age: 62
End: 2022-05-20
Attending: FAMILY MEDICINE
Payer: COMMERCIAL

## 2022-05-20 DIAGNOSIS — M54.16 LUMBAR RADICULOPATHY: ICD-10-CM

## 2022-05-20 DIAGNOSIS — M99.04 SACROILIAC JOINT SOMATIC DYSFUNCTION: ICD-10-CM

## 2022-05-20 PROCEDURE — 62323 NJX INTERLAMINAR LMBR/SAC: CPT | Performed by: RADIOLOGY

## 2022-05-20 RX ORDER — IOPAMIDOL 408 MG/ML
2 INJECTION, SOLUTION INTRATHECAL ONCE
Status: COMPLETED | OUTPATIENT
Start: 2022-05-20 | End: 2022-05-20

## 2022-05-20 RX ORDER — BUPIVACAINE HYDROCHLORIDE 5 MG/ML
2 INJECTION, SOLUTION PERINEURAL ONCE
Status: COMPLETED | OUTPATIENT
Start: 2022-05-20 | End: 2022-05-20

## 2022-05-20 RX ORDER — LIDOCAINE HYDROCHLORIDE 10 MG/ML
5 INJECTION, SOLUTION EPIDURAL; INFILTRATION; INTRACAUDAL; PERINEURAL ONCE
Status: COMPLETED | OUTPATIENT
Start: 2022-05-20 | End: 2022-05-20

## 2022-05-20 RX ORDER — METHYLPREDNISOLONE ACETATE 40 MG/ML
40 INJECTION, SUSPENSION INTRA-ARTICULAR; INTRALESIONAL; INTRAMUSCULAR; SOFT TISSUE ONCE
Status: COMPLETED | OUTPATIENT
Start: 2022-05-20 | End: 2022-05-20

## 2022-05-20 RX ADMIN — IOPAMIDOL 2 ML: 408 INJECTION, SOLUTION INTRATHECAL at 08:19

## 2022-05-20 RX ADMIN — BUPIVACAINE HYDROCHLORIDE 10 MG: 5 INJECTION, SOLUTION PERINEURAL at 08:19

## 2022-05-20 RX ADMIN — METHYLPREDNISOLONE ACETATE 40 MG: 40 INJECTION, SUSPENSION INTRA-ARTICULAR; INTRALESIONAL; INTRAMUSCULAR; SOFT TISSUE at 08:18

## 2022-05-20 RX ADMIN — LIDOCAINE HYDROCHLORIDE 5 ML: 10 INJECTION, SOLUTION EPIDURAL; INFILTRATION; INTRACAUDAL; PERINEURAL at 08:19

## 2022-05-20 RX ADMIN — METHYLPREDNISOLONE ACETATE 40 MG: 40 INJECTION, SUSPENSION INTRA-ARTICULAR; INTRALESIONAL; INTRAMUSCULAR; SOFT TISSUE at 08:19

## 2022-05-20 NOTE — PROGRESS NOTES
Ed was seen in X-ray today for a Lumbar epidural injection. Patient rated pain before procedure 7/10. After procedure patient rated pain 5/10.   This pain level is acceptable to patient. Patient discharged home with .

## 2022-05-20 NOTE — DISCHARGE SUMMARY
Radiology Discharge Instructions Post Lumbar Puncture  AFTER YOU GO HOME  ? DO relax and take it easy for 24 hours; we suggest bed rest until the next morning, except for getting up to the bathroom  ? You may resume normal activity tomorrow  ? You may remove the bandage on your back in the evening or next morning  ? You may resume bathing the next day  ? Drink at least 4 glasses of extra fluid today if not on a fluid restriction.  ? DO NOT drive or operate machinery at home or at work for at least 24 hours.    CALL YOUR PRIMARY PHYSICIAN IF:  ? If you do start to leak a large amount of fluid from the puncture site, lie down flat on your back. Call your primary physician they will tell you if you need to return to the hospital.  ? You develop severe headache  ? You develop nausea or vomiting.  ? You develop a temperature of 101 degrees F or greater.    ADDITIONAL INSTRUCTIONS:   ? If you are taking a blood thinner, you may resume your medication at your regular dose today.  Follow up with your physician to have your INR rechecked if indicated.  ? You may use over the counter pain reliever, do not use aspirin for 24 hours.    Contacts:  During business hours from 8 to 5 pm, you may call [phone] to reach a nurse advisor.  After hours call [facility] [phone].  Ask for the Radiologist on call.  Someone is on call 24 hrs/day.  [facility] Toll Free Number   [phone]

## 2022-06-13 ENCOUNTER — OFFICE VISIT (OUTPATIENT)
Dept: URGENT CARE | Facility: URGENT CARE | Age: 62
End: 2022-06-13
Payer: COMMERCIAL

## 2022-06-13 ENCOUNTER — OFFICE VISIT (OUTPATIENT)
Dept: PEDIATRICS | Facility: CLINIC | Age: 62
End: 2022-06-13
Payer: COMMERCIAL

## 2022-06-13 VITALS
OXYGEN SATURATION: 97 % | HEART RATE: 89 BPM | TEMPERATURE: 98.2 F | SYSTOLIC BLOOD PRESSURE: 119 MMHG | RESPIRATION RATE: 18 BRPM | DIASTOLIC BLOOD PRESSURE: 75 MMHG

## 2022-06-13 VITALS
OXYGEN SATURATION: 96 % | WEIGHT: 170.2 LBS | BODY MASS INDEX: 24.77 KG/M2 | TEMPERATURE: 97.9 F | HEART RATE: 114 BPM | SYSTOLIC BLOOD PRESSURE: 115 MMHG | DIASTOLIC BLOOD PRESSURE: 65 MMHG

## 2022-06-13 DIAGNOSIS — M79.661 PAIN OF RIGHT LOWER LEG: ICD-10-CM

## 2022-06-13 DIAGNOSIS — M54.16 LUMBAR BACK PAIN WITH RADICULOPATHY AFFECTING RIGHT LOWER EXTREMITY: ICD-10-CM

## 2022-06-13 DIAGNOSIS — M79.671 RIGHT FOOT PAIN: ICD-10-CM

## 2022-06-13 DIAGNOSIS — M79.661 PAIN OF RIGHT LOWER LEG: Primary | ICD-10-CM

## 2022-06-13 DIAGNOSIS — M54.16 LUMBAR RADICULOPATHY: Primary | ICD-10-CM

## 2022-06-13 DIAGNOSIS — M53.3 SI (SACROILIAC) JOINT DYSFUNCTION: ICD-10-CM

## 2022-06-13 PROCEDURE — 96372 THER/PROPH/DIAG INJ SC/IM: CPT | Performed by: NURSE PRACTITIONER

## 2022-06-13 PROCEDURE — 99207 PR FIRST ORDER ACUTE REFERRAL: CPT | Performed by: NURSE PRACTITIONER

## 2022-06-13 PROCEDURE — 99214 OFFICE O/P EST MOD 30 MIN: CPT | Mod: 25

## 2022-06-13 RX ORDER — KETOROLAC TROMETHAMINE 30 MG/ML
30 INJECTION, SOLUTION INTRAMUSCULAR; INTRAVENOUS ONCE
Status: COMPLETED | OUTPATIENT
Start: 2022-06-13 | End: 2022-06-13

## 2022-06-13 RX ORDER — METHYLPREDNISOLONE 4 MG
TABLET, DOSE PACK ORAL
Qty: 21 TABLET | Refills: 0 | Status: SHIPPED | OUTPATIENT
Start: 2022-06-13 | End: 2022-06-13 | Stop reason: ALTCHOICE

## 2022-06-13 RX ORDER — PREDNISONE 20 MG/1
40 TABLET ORAL DAILY
Qty: 10 TABLET | Refills: 0 | Status: SHIPPED | OUTPATIENT
Start: 2022-06-13 | End: 2022-06-18

## 2022-06-13 RX ORDER — TIZANIDINE 2 MG/1
2 TABLET ORAL 3 TIMES DAILY PRN
Qty: 30 TABLET | Refills: 0 | Status: SHIPPED | OUTPATIENT
Start: 2022-06-13 | End: 2023-01-26

## 2022-06-13 RX ADMIN — KETOROLAC TROMETHAMINE 30 MG: 30 INJECTION, SOLUTION INTRAMUSCULAR; INTRAVENOUS at 11:43

## 2022-06-13 ASSESSMENT — PAIN SCALES - GENERAL: PAINLEVEL: MODERATE PAIN (5)

## 2022-06-13 NOTE — PROGRESS NOTES
"  Assessment & Plan     Lumbar radiculopathy  US of right leg is negative for DVT. He has localized tenderness to palpation over an area that feels fluctuant on the right distal anterolateral LE and the radiologist saw no fluid collection in this area or abnormality of the blood vessels. He has no signs of cellulitis. I suspect the pain in his right leg is related to lumbar radiculopathy and recommended treating with a burst of steroids and close follow up with his spine specialist to further evaluate. Patient is agreeable to plan of care.   - predniSONE (DELTASONE) 20 MG tablet; Take 2 tablets (40 mg) by mouth daily for 5 days  - tiZANidine (ZANAFLEX) 2 MG tablet; Take 1 tablet (2 mg) by mouth 3 times daily as needed for muscle spasms    Pain of right lower leg  - US Lower Extremity Venous Duplex Right      35 minutes spent on the date of the encounter doing chart review, review of test results, interpretation of tests, patient visit and documentation      BMI:   Estimated body mass index is 24.77 kg/m  as calculated from the following:    Height as of 1/28/22: 1.765 m (5' 9.5\").    Weight as of an earlier encounter on 6/13/22: 77.2 kg (170 lb 3.2 oz).         No follow-ups on file.    [facility] GUILHERME Ward is a 62 year old who presents for the following health issues     HPI Right knee/calf/foot/toes pain for the past 6/6/22. He had a cortisone shot 5/20 on the right lower back due to back pain.   Patient is comfortable now due to shot of Toradol.   Pain History:  When did you first notice your pain? - Acute Pain   Have you seen anyone else for your pain? Yes   Where in your body do you have pain? Musculoskeletal problem/pain  Onset/Duration: 6/6/22  Description  Location: foot, leg and knee - right  Joint Swelling: no  Redness: no  Pain: YES  Warmth: no  Intensity:  severe  Progression of Symptoms:  worsening and constant  Accompanying signs and symptoms:   Fevers: " no  Numbness/tingling/weakness: YES Toes   History  Trauma to the area: no  Recent illness:  no  Previous similar problem: no  Previous evaluation:  YES  Precipitating or alleviating factors:  Aggravating factors include: sitting, standing and walking  Therapies tried and outcome: NSAID      Patient Active Problem List   Diagnosis     Erectile Dysfunction     Overweight (BMI 25.0-29.9)     Hyperlipidemia LDL goal <100     Essential hypertension with goal blood pressure less than 140/90     Impaired fasting glucose     High triglycerides     Prostatic nodule     Primary insomnia     Skin cyst     Elevated ferritin     Keloid scar, right neck     Fall     Major depressive disorder, recurrent episode (H)     Slac (scapholunate advanced collapse) of wrist, left     Alcohol dependence (H)     Lumbar radiculitis     Current Outpatient Medications   Medication     predniSONE (DELTASONE) 20 MG tablet     tiZANidine (ZANAFLEX) 2 MG tablet     acetaminophen (TYLENOL) 500 MG tablet     lisinopril-hydrochlorothiazide (ZESTORETIC) 20-25 MG tablet     Multiple Vitamin (DAILY VITAMINS PO)     predniSONE (DELTASONE) 10 MG tablet     simvastatin (ZOCOR) 40 MG tablet     traZODone (DESYREL) 50 MG tablet     No current facility-administered medications for this visit.         Review of Systems   Constitutional, HEENT, cardiovascular, pulmonary, GI, , musculoskeletal, neuro, skin, endocrine and psych systems are negative, except as otherwise noted.      Objective    /75 (BP Location: Left arm, Patient Position: Sitting, Cuff Size: Adult Regular)   Pulse 89   Temp 98.2  F (36.8  C) (Oral)   Resp 18   SpO2 97%   There is no height or weight on file to calculate BMI.  Physical Exam   GENERAL APPEARANCE: alert and no distress  MS: nonpitting edema of the right lower leg with localized tenderness to palpation of the left lateral distal LE   SKIN: no suspicious lesions or rashes  NEURO: Normal strength and tone, mentation intact  "and speech normal  PSYCH: mentation appears normal and affect normal/bright    Results for orders placed or performed in visit on 06/13/22 (from the past 24 hour(s))   US Lower Extremity Venous Duplex Right    Addendum: 6/13/2022    Addendum: There is no obvious soft tissue abnormality identified in  the area of the lump near the ankle.    SHAI WHYTE MD         SYSTEM ID:  [id number]      Narrative    Exam: Ultrasound of the deep venous system of right leg dated  6/13/2022 2:08 PM    Clinical information: Anterolateral right calf pain for one to two  weeks.    Comparison: 10/21/2020    Ordering provider: ANTELMO WILSON    Technique: Gray-scale evaluation with compression and Doppler  assessment of deep venous system for spontaneous and phasic flow, as  well as the presence of distal augmentation. Color flow images  obtained as needed. Gray-scale images with compression of the great  saphenous vein obtained as needed.    Findings:    Right leg:    CFV: Thrombus: No, Phasic: Yes  Femoral vein, proximal: Thrombus: No, Phasic: Yes  Femoral vein, mid: Thrombus: No, Phasic: Yes  Femoral vein, distal: Thrombus: No, Phasic: Yes  Popliteal vein: Thrombus: No, Phasic: Yes  PTV: Thrombus: No  Peroneal vein: Thrombus: No    Left leg:    CFV: Thrombus: No, Phasic: Yes      Impression    Impression:    Right leg: No deep venous thrombosis.     Reference: \"Duplex Ultrasound in the Diagnosis of Lower-Extremity Deep  Venous Thrombosis\"- Brinda Reyes MD, S; Maury Frye MD  (Circulation. 2014;129:917-921. http://circ.ahajournals.org )    JALEN ASENCIO         SYSTEM ID:  [id number]     Results for orders placed or performed in visit on 06/13/22   US Lower Extremity Venous Duplex Right    Impression    Impression:    Right leg: No deep venous thrombosis.     Reference: \"Duplex Ultrasound in the Diagnosis of Lower-Extremity Deep  Venous Thrombosis\"- Brinda Reyes MD, S; Maury Frye, " "MD  (Circulation. 2014;129:917-921. http://circ.ahajournals.org )    JALEN ASENCIO         SYSTEM ID:  [id number]     US Lower Extremity Venous Duplex Right    Addendum Date: 6/13/2022    Addendum: There is no obvious soft tissue abnormality identified in the area of the lump near the ankle. SHAI WHYTE MD   SYSTEM ID:  [id number]    Result Date: 6/13/2022  Exam: Ultrasound of the deep venous system of right leg dated 6/13/2022 2:08 PM Clinical information: Anterolateral right calf pain for one to two weeks. Comparison: 10/21/2020 Ordering provider: ANTELMO WILSON Technique: Gray-scale evaluation with compression and Doppler assessment of deep venous system for spontaneous and phasic flow, as well as the presence of distal augmentation. Color flow images obtained as needed. Gray-scale images with compression of the great saphenous vein obtained as needed. Findings: Right leg: CFV: Thrombus: No, Phasic: Yes Femoral vein, proximal: Thrombus: No, Phasic: Yes Femoral vein, mid: Thrombus: No, Phasic: Yes Femoral vein, distal: Thrombus: No, Phasic: Yes Popliteal vein: Thrombus: No, Phasic: Yes PTV: Thrombus: No Peroneal vein: Thrombus: No Left leg: CFV: Thrombus: No, Phasic: Yes     Impression: Right leg: No deep venous thrombosis. Reference: \"Duplex Ultrasound in the Diagnosis of Lower-Extremity Deep Venous Thrombosis\"- Brinda Reyes MD, S; Maury Frye MD (Circulation. 2014;129:917-921. http://circ.ahajournals.org ) JALEN ASENCIO   SYSTEM ID:  [id number]            "

## 2022-06-13 NOTE — PROGRESS NOTES
Assessment & Plan      Diagnosis Comments   1. Pain of right lower leg  ketorolac (TORADOL) injection 30 mg, Referral to Acute and Diagnostic Services (Day of diagnostic / First order acute)    2. Right foot pain  ketorolac (TORADOL) injection 30 mg, Referral to Acute and Diagnostic Services (Day of diagnostic / First order acute)    3. Lumbar back pain with radiculopathy affecting right lower extremity     4. SI (sacroiliac) joint dysfunction       Discussed transfer to [facility] with accepting provider to rule out DVT.  Differential diagnosis would include increasing symptoms of lumbar sciatica.  Also discussed with patient after having his SALOME it would be beneficial to follow-up with his primary provider.  Toradol was given for pain.  VANGIE Siddiqui [facility] ELVISZEINA Ward is a 62 year old male who presents to clinic today for the following health issues:  Chief Complaint   Patient presents with     Musculoskeletal Problem     Having pain in right leg, no injury, pt had a Cortizone injection 2-3 weeks ago something triggered possible blood clot, unbearable pain, started last Tuesday      HPI    Patient with history of SI joint dysfunction, lumbar pain with right radiculopathy posterior to foot. He was experiencing numbness and tingling in the right foot with the radiculopathy. States pain is 10/10 at times and is located anterior shin to total foot right side.     8/26/2021 MRI results  IMPRESSION:  1.  Multilevel degenerative disc disease is moderate at L5-S1 and more  mild elsewhere. Accompanying small diffuse posterior disc bulges  contribute to a multilevel mild spinal canal stenosis.     2.  Type I Modic endplate degenerative change at L5-S1 with smaller  components of type I Modic endplate degenerative change anteriorly at  L1-L2 and T11-T12. Additional degenerative osseous edema at the  bilateral L3-L4 and left L4-L5 facets.     3.  Facet arthropathy is  most pronounced at the L3-L4 level, where it  is moderate bilaterally.     4.  Moderate right and moderate-to-severe left L5-S1 neural foraminal  stenosis with contact of the exiting left L5 nerve. Additional  mild-to-moderate bilateral L4-L5 neural foraminal stenosis with  mild-to-moderate left L3-L4 neural foraminal stenosis.     5.  Chronic compression fracture with mild height loss along the  superior endplate of L1.     6.  Epidural lipomatosis throughout the lumbar spine with an  accompanying thecal sac effacement as detailed above.     PERRY CORONA MD         Review of Systems  Constitutional, HEENT, cardiovascular, pulmonary, gi and gu systems are negative, except as otherwise noted.      Objective    /65 (BP Location: Left arm, Patient Position: Sitting, Cuff Size: Adult Regular)   Pulse 114   Temp 97.9  F (36.6  C) (Axillary)   Wt 77.2 kg (170 lb 3.2 oz)   SpO2 96%   BMI 24.77 kg/m    Physical Exam   GENERAL: alert, moderate distress and fatigued  NECK: no adenopathy, no asymmetry, masses, or scars and thyroid normal to palpation  RESP: lungs clear to auscultation - no rales, rhonchi or wheezes  CV: regular rate and rhythm, normal S1 S2, no S3 or S4, no murmur, click or rub, no peripheral edema and peripheral pulses strong  ABDOMEN: soft, nontender, no hepatosplenomegaly, no masses and bowel sounds normal  MS: no gross musculoskeletal defects noted, no edema  SKIN: CMS RLE nomal pulses pedal and dorsalis strong. Negative for erythema, or warmth. Mild pain shin and foot diffuse right.

## 2022-11-21 ENCOUNTER — HEALTH MAINTENANCE LETTER (OUTPATIENT)
Age: 62
End: 2022-11-21

## 2022-11-21 ENCOUNTER — MYC MEDICAL ADVICE (OUTPATIENT)
Dept: FAMILY MEDICINE | Facility: CLINIC | Age: 62
End: 2022-11-21

## 2023-01-17 ENCOUNTER — OFFICE VISIT (OUTPATIENT)
Dept: FAMILY MEDICINE | Facility: CLINIC | Age: 63
End: 2023-01-17
Payer: COMMERCIAL

## 2023-01-17 VITALS
HEART RATE: 100 BPM | SYSTOLIC BLOOD PRESSURE: 111 MMHG | HEIGHT: 68 IN | DIASTOLIC BLOOD PRESSURE: 73 MMHG | WEIGHT: 178.8 LBS | BODY MASS INDEX: 27.1 KG/M2 | TEMPERATURE: 97.9 F | RESPIRATION RATE: 21 BRPM | OXYGEN SATURATION: 99 %

## 2023-01-17 DIAGNOSIS — M54.16 LUMBAR RADICULOPATHY: ICD-10-CM

## 2023-01-17 DIAGNOSIS — M99.04 SACROILIAC JOINT SOMATIC DYSFUNCTION: ICD-10-CM

## 2023-01-17 DIAGNOSIS — F51.01 PRIMARY INSOMNIA: ICD-10-CM

## 2023-01-17 DIAGNOSIS — I10 ESSENTIAL HYPERTENSION WITH GOAL BLOOD PRESSURE LESS THAN 140/90: ICD-10-CM

## 2023-01-17 DIAGNOSIS — F10.20 ALCOHOL USE DISORDER, SEVERE, DEPENDENCE (H): ICD-10-CM

## 2023-01-17 DIAGNOSIS — F33.0 MAJOR DEPRESSIVE DISORDER, RECURRENT EPISODE, MILD (H): ICD-10-CM

## 2023-01-17 DIAGNOSIS — M79.89 RIGHT LEG SWELLING: Primary | ICD-10-CM

## 2023-01-17 DIAGNOSIS — E87.6 HYPOKALEMIA: ICD-10-CM

## 2023-01-17 DIAGNOSIS — I87.2 VENOUS INCOMPETENCE: ICD-10-CM

## 2023-01-17 LAB
ERYTHROCYTE [DISTWIDTH] IN BLOOD BY AUTOMATED COUNT: 13.9 % (ref 10–15)
HCT VFR BLD AUTO: 37.2 % (ref 40–53)
HGB BLD-MCNC: 12.4 G/DL (ref 13.3–17.7)
MCH RBC QN AUTO: 34.9 PG (ref 26.5–33)
MCHC RBC AUTO-ENTMCNC: 33.3 G/DL (ref 31.5–36.5)
MCV RBC AUTO: 105 FL (ref 78–100)
PLATELET # BLD AUTO: 208 10E3/UL (ref 150–450)
RBC # BLD AUTO: 3.55 10E6/UL (ref 4.4–5.9)
WBC # BLD AUTO: 4.2 10E3/UL (ref 4–11)

## 2023-01-17 PROCEDURE — 84443 ASSAY THYROID STIM HORMONE: CPT | Performed by: NURSE PRACTITIONER

## 2023-01-17 PROCEDURE — 85027 COMPLETE CBC AUTOMATED: CPT | Performed by: NURSE PRACTITIONER

## 2023-01-17 PROCEDURE — 83880 ASSAY OF NATRIURETIC PEPTIDE: CPT | Performed by: NURSE PRACTITIONER

## 2023-01-17 PROCEDURE — 36415 COLL VENOUS BLD VENIPUNCTURE: CPT | Performed by: NURSE PRACTITIONER

## 2023-01-17 PROCEDURE — 80053 COMPREHEN METABOLIC PANEL: CPT | Performed by: NURSE PRACTITIONER

## 2023-01-17 PROCEDURE — 99214 OFFICE O/P EST MOD 30 MIN: CPT | Performed by: NURSE PRACTITIONER

## 2023-01-17 RX ORDER — PREDNISONE 10 MG/1
30 TABLET ORAL 2 TIMES DAILY
Qty: 30 TABLET | Refills: 0 | Status: SHIPPED | OUTPATIENT
Start: 2023-01-17 | End: 2024-02-08

## 2023-01-17 RX ORDER — LISINOPRIL AND HYDROCHLOROTHIAZIDE 20; 25 MG/1; MG/1
1 TABLET ORAL DAILY
Qty: 90 TABLET | Refills: 1 | Status: SHIPPED | OUTPATIENT
Start: 2023-01-17 | End: 2024-02-07

## 2023-01-17 RX ORDER — TRAZODONE HYDROCHLORIDE 50 MG/1
TABLET, FILM COATED ORAL
Qty: 30 TABLET | Refills: 1 | Status: SHIPPED | OUTPATIENT
Start: 2023-01-17 | End: 2023-09-21

## 2023-01-17 ASSESSMENT — PATIENT HEALTH QUESTIONNAIRE - PHQ9
SUM OF ALL RESPONSES TO PHQ QUESTIONS 1-9: 9
10. IF YOU CHECKED OFF ANY PROBLEMS, HOW DIFFICULT HAVE THESE PROBLEMS MADE IT FOR YOU TO DO YOUR WORK, TAKE CARE OF THINGS AT HOME, OR GET ALONG WITH OTHER PEOPLE: SOMEWHAT DIFFICULT
SUM OF ALL RESPONSES TO PHQ QUESTIONS 1-9: 9

## 2023-01-17 NOTE — PATIENT INSTRUCTIONS
To schedule your imaging exam at any of the [facility] imaging locations, please call [phone]

## 2023-01-17 NOTE — PROGRESS NOTES
Assessment & Plan     Right leg swelling  Will get labs and LE US. He has had this issue on a couple of occasions before so will refer to vascular vein clinic.   - BNP-N terminal pro; Future  - TSH with free T4 reflex; Future  - CBC with platelets; Future  - US Lower Extremity Venous Duplex Right; Future  - Comprehensive metabolic panel (BMP + Alb, Alk Phos, ALT, AST, Total. Bili, TP); Future  - BNP-N terminal pro  - TSH with free T4 reflex  - CBC with platelets  - Comprehensive metabolic panel (BMP + Alb, Alk Phos, ALT, AST, Total. Bili, TP)  - Vascular Surgery Referral; Future    Essential hypertension with goal blood pressure less than 140/90  Patient requesting refill. Controlled BP.  - lisinopril-hydrochlorothiazide (ZESTORETIC) 20-25 MG tablet; Take 1 tablet by mouth daily for blood pressure.    Primary insomnia  Refilled. He has been taking for several years.  - traZODone (DESYREL) 50 MG tablet; TAKE 1 TABLET(50 MG) BY MOUTH AT BEDTIME FOR SLEEP    Major depressive disorder, recurrent episode, mild (H)  Refilled. He has been taking for several years. Doing well. No thoughts of harm.  - traZODone (DESYREL) 50 MG tablet; TAKE 1 TABLET(50 MG) BY MOUTH AT BEDTIME FOR SLEEP    Sacroiliac joint somatic dysfunction  Patient requesting refilled which was given today. No red flags. I recommended physical therapy as well as follow up with his spine specialist if no improvement.  - predniSONE (DELTASONE) 10 MG tablet; Take 3 tablets (30 mg) by mouth 2 times daily    Lumbar radiculopathy  As above.  - predniSONE (DELTASONE) 10 MG tablet; Take 3 tablets (30 mg) by mouth 2 times daily    Alcohol use disorder, severe, dependence (H)  Patient states he has cut way down since working 2 jobs and second one being .     Hypokalemia  Replaced.   - potassium chloride ER (KLOR-CON M) 10 MEQ CR tablet; Take 1 tablet (10 mEq) by mouth 2 times daily for 3 days    Venous incompetence  As above  - Vascular Surgery  "Referral; Future             BMI:   Estimated body mass index is 27.13 kg/m  as calculated from the following:    Height as of this encounter: 1.729 m (5' 8.07\").    Weight as of this encounter: 81.1 kg (178 lb 12.8 oz).       See Patient Instructions    Return in about 1 week (around 1/24/2023), or if symptoms worsen or fail to improve.    The benefits, risks and potential side effects were discussed in detail. Black box warnings discussed as relevant. All patient questions were answered. The patient was instructed to follow up immediately if any adverse reactions develop.    Return precautions discussed, including when to seek urgent/emergent care.    Patient verbalizes understanding and agrees with plan of care. Patient stable for discharge.      VANGIE DYE CNP [facility]    Brian Ward is a 62 year old presenting for the following health issues:  Leg Swelling (R) and Form Request (FMLA)    History of Present Illness       Reason for visit:  Right leg swelled up really bad Friday and Saturday, probably need a blood thinner or something to cope. 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 I need the doctor to sign off on my FMLA for my main job for my back issues    He eats 0-1 servings of fruits and vegetables daily.He consumes 2 sweetened beverage(s) daily.He exercises with enough effort to increase his heart rate 10 to 19 minutes per day.  He exercises with enough effort to increase his heart rate 3 or less days per week. He is missing 1 dose(s) of medications per week.    Today's PHQ-9         PHQ-9 Total Score: 9    PHQ-9 Q9 Thoughts of better off dead/self-harm past 2 weeks :   Not at all    How difficult have these problems made it for you to do your work, take care of things at home, or get along with other people: Somewhat difficult     Of Note: Has been on current BP medication for 25 years. Works as a / for [employer] as 2nd job standing and " "moving around. Also requesting refill for prednisone.          RLE pain/swelling for 4-5 days  First job sits all day and then goes to 2nd job where he is on his feet a lot  Taking Tylenol Arthritis 650 mg. Taking 3  Chart review shows he has had this problem twice before with 2 negative LE US    Wants FMLA for back  Back pain worse in the mornings  2 cortisone shots in back in the last year  They worked for couple months   Tried back braces, heat, cold  Hasn't missed any work this month but last month missed 7 days from day job  Current flare of pain last week or so.  No loss of bowel or bladder  No saddle anesthesia   Has seen spine specialist who recommended physical therapy but hasn't yet done    Needs refill of trazodone. Has taken for several years.  Has to lay in certain position due to back pain  Has trouble falling asleep and staying asleep  Takes for depression/insomnia although he reports mental health is doing well at this time    Alcohol use: reports he has cut back substantially since picking up second job as . Works evening shift and doesn't drink and drive  Prefers vodka. When he does drink he estimates 4 oz few days per week  Doesn't combine alcohol and trazodone      Review of Systems   Constitutional, HEENT, cardiovascular, pulmonary, GI, , musculoskeletal, neuro, skin, endocrine and psych systems are negative, except as otherwise noted.      Objective    /73 (BP Location: Left arm, Patient Position: Sitting, Cuff Size: Adult Large)   Pulse 100   Temp 97.9  F (36.6  C) (Oral)   Resp 21   Ht 1.729 m (5' 8.07\")   Wt 81.1 kg (178 lb 12.8 oz)   SpO2 99%   BMI 27.13 kg/m    Body mass index is 27.13 kg/m .  Physical Exam   GENERAL: healthy, alert and no distress  EYES: Eyes grossly normal to inspection, PERRL and conjunctivae and sclerae normal  HENT: ear canals and TM's normal, nose and mouth without ulcers or lesions. Poor dentition   RESP: lungs clear to auscultation - no " rales, rhonchi or wheezes  CV: regular rate and rhythm, normal S1 S2, no S3 or S4, no murmur, click or rub, and peripheral pulses strong  MS: no gross musculoskeletal defects noted,  RLE with 1-2+ edema  SKIN: no suspicious lesions or rashes  PSYCH: mentation appears normal, affect normal/bright    Results for orders placed or performed in visit on 01/17/23   BNP-N terminal pro     Status: Normal   Result Value Ref Range    N Terminal Pro BNP Outpatient 10 0 - 900 pg/mL   TSH with free T4 reflex     Status: Normal   Result Value Ref Range    TSH 1.75 0.40 - 4.00 mU/L   CBC with platelets     Status: Abnormal   Result Value Ref Range    WBC Count 4.2 4.0 - 11.0 10e3/uL    RBC Count 3.55 (L) 4.40 - 5.90 10e6/uL    Hemoglobin 12.4 (L) 13.3 - 17.7 g/dL    Hematocrit 37.2 (L) 40.0 - 53.0 %     (H) 78 - 100 fL    MCH 34.9 (H) 26.5 - 33.0 pg    MCHC 33.3 31.5 - 36.5 g/dL    RDW 13.9 10.0 - 15.0 %    Platelet Count 208 150 - 450 10e3/uL   Comprehensive metabolic panel (BMP + Alb, Alk Phos, ALT, AST, Total. Bili, TP)     Status: Abnormal   Result Value Ref Range    Sodium 139 133 - 144 mmol/L    Potassium 3.1 (L) 3.4 - 5.3 mmol/L    Chloride 98 94 - 109 mmol/L    Carbon Dioxide (CO2) 25 20 - 32 mmol/L    Anion Gap 16 (H) 3 - 14 mmol/L    Urea Nitrogen 12 7 - 30 mg/dL    Creatinine 0.68 0.66 - 1.25 mg/dL    Calcium 9.3 8.5 - 10.1 mg/dL    Glucose 87 70 - 99 mg/dL    Alkaline Phosphatase 104 40 - 150 U/L    AST 68 (H) 0 - 45 U/L    ALT 39 0 - 70 U/L    Protein Total 8.3 6.8 - 8.8 g/dL    Albumin 4.1 3.4 - 5.0 g/dL    Bilirubin Total 0.5 0.2 - 1.3 mg/dL    GFR Estimate >90 >60 mL/min/1.73m2

## 2023-01-18 LAB
ALBUMIN SERPL-MCNC: 4.1 G/DL (ref 3.4–5)
ALP SERPL-CCNC: 104 U/L (ref 40–150)
ALT SERPL W P-5'-P-CCNC: 39 U/L (ref 0–70)
ANION GAP SERPL CALCULATED.3IONS-SCNC: 16 MMOL/L (ref 3–14)
AST SERPL W P-5'-P-CCNC: 68 U/L (ref 0–45)
BILIRUB SERPL-MCNC: 0.5 MG/DL (ref 0.2–1.3)
BUN SERPL-MCNC: 12 MG/DL (ref 7–30)
CALCIUM SERPL-MCNC: 9.3 MG/DL (ref 8.5–10.1)
CHLORIDE BLD-SCNC: 98 MMOL/L (ref 94–109)
CO2 SERPL-SCNC: 25 MMOL/L (ref 20–32)
CREAT SERPL-MCNC: 0.68 MG/DL (ref 0.66–1.25)
GFR SERPL CREATININE-BSD FRML MDRD: >90 ML/MIN/1.73M2
GLUCOSE BLD-MCNC: 87 MG/DL (ref 70–99)
NT-PROBNP SERPL-MCNC: 10 PG/ML (ref 0–900)
POTASSIUM BLD-SCNC: 3.1 MMOL/L (ref 3.4–5.3)
PROT SERPL-MCNC: 8.3 G/DL (ref 6.8–8.8)
SODIUM SERPL-SCNC: 139 MMOL/L (ref 133–144)
TSH SERPL DL<=0.005 MIU/L-ACNC: 1.75 MU/L (ref 0.4–4)

## 2023-01-18 RX ORDER — POTASSIUM CHLORIDE 750 MG/1
10 TABLET, EXTENDED RELEASE ORAL 2 TIMES DAILY
Qty: 6 TABLET | Refills: 0 | Status: SHIPPED | OUTPATIENT
Start: 2023-01-18 | End: 2023-01-21

## 2023-01-19 ENCOUNTER — MYC REFILL (OUTPATIENT)
Dept: FAMILY MEDICINE | Facility: CLINIC | Age: 63
End: 2023-01-19
Payer: COMMERCIAL

## 2023-01-19 ENCOUNTER — TELEPHONE (OUTPATIENT)
Dept: FAMILY MEDICINE | Facility: CLINIC | Age: 63
End: 2023-01-19
Payer: COMMERCIAL

## 2023-01-19 ENCOUNTER — TELEPHONE (OUTPATIENT)
Dept: VASCULAR SURGERY | Facility: CLINIC | Age: 63
End: 2023-01-19
Payer: COMMERCIAL

## 2023-01-19 RX ORDER — ACETAMINOPHEN 500 MG
500-1000 TABLET ORAL EVERY 6 HOURS PRN
Status: CANCELLED | OUTPATIENT
Start: 2023-01-19

## 2023-01-19 NOTE — TELEPHONE ENCOUNTER
Copy placed in abstract and tc bin. Original placed at the  for pt . Called pt so he is aware this is ready

## 2023-01-19 NOTE — TELEPHONE ENCOUNTER
Patient calling on the status of his FMLA forms that he brought to his appointment with Page Lundberg on 1/17/2023. I told him that we would contact him when these are available for .  Jodi Hampton [facility]  2nd Floor  Primary Care

## 2023-01-23 ENCOUNTER — MYC REFILL (OUTPATIENT)
Dept: FAMILY MEDICINE | Facility: CLINIC | Age: 63
End: 2023-01-23
Payer: COMMERCIAL

## 2023-01-23 DIAGNOSIS — E87.6 HYPOKALEMIA: ICD-10-CM

## 2023-01-23 RX ORDER — POTASSIUM CHLORIDE 750 MG/1
10 TABLET, EXTENDED RELEASE ORAL 2 TIMES DAILY
Qty: 6 TABLET | Refills: 0 | OUTPATIENT
Start: 2023-01-23

## 2023-01-23 NOTE — TELEPHONE ENCOUNTER
Patient needs to be scheduled for in person consult with vascular medicine at next available for venous incompetence.    Alexandra KAN, RN    [facility]  Office: [phone]  Fax: [phone]

## 2023-01-23 NOTE — TELEPHONE ENCOUNTER
"Pt is calling in regards of the referral placed by his PCP for right leg swelling and \"vein incompetency\". Spoke w/ pt, he states he does not have any visible VV and neither is he looking to treat VV. I believe he is best fit to be seen at [facility]. Notified someone will review and call to schedule.   "

## 2023-01-24 ENCOUNTER — LAB (OUTPATIENT)
Dept: LAB | Facility: CLINIC | Age: 63
End: 2023-01-24
Payer: COMMERCIAL

## 2023-01-24 DIAGNOSIS — E87.6 HYPOKALEMIA: ICD-10-CM

## 2023-01-24 PROCEDURE — 84132 ASSAY OF SERUM POTASSIUM: CPT

## 2023-01-24 PROCEDURE — 36415 COLL VENOUS BLD VENIPUNCTURE: CPT

## 2023-01-24 NOTE — TELEPHONE ENCOUNTER
Future Appointments   Date Time Provider Department Center   1/31/2023  4:00 PM Meghna Galaviz MD [facility]

## 2023-01-25 ENCOUNTER — TELEPHONE (OUTPATIENT)
Dept: FAMILY MEDICINE | Facility: CLINIC | Age: 63
End: 2023-01-25
Payer: COMMERCIAL

## 2023-01-25 DIAGNOSIS — E87.6 HYPOKALEMIA: Primary | ICD-10-CM

## 2023-01-25 LAB — POTASSIUM BLD-SCNC: 3 MMOL/L (ref 3.4–5.3)

## 2023-01-25 NOTE — TELEPHONE ENCOUNTER
I called patient to discuss potassium result  When he calls back, please let me know when he's available and I'll call him back. thanks

## 2023-01-26 RX ORDER — POTASSIUM CHLORIDE 750 MG/1
20 TABLET, EXTENDED RELEASE ORAL DAILY
Qty: 10 TABLET | Refills: 0 | Status: SHIPPED | OUTPATIENT
Start: 2023-01-26 | End: 2023-01-31

## 2023-01-26 NOTE — TELEPHONE ENCOUNTER
Hasn't had any alcohol since visit and plans to continue to abstain  He is on hydrochlorothiazide but has been on this for several years and never had issues with hypokalemia and has excellent BP control  Reports that leg is less swollen. Has follow up with vein clinic on Tuesday  We will replace potassium and recheck next week  UC precautions discussed  Patient verbalizes understanding

## 2023-01-26 NOTE — TELEPHONE ENCOUNTER
Pt returning phone call. Message sent to provider on teams otherwise pt plans to call back again tomorrow       Yola Prieto RN [facility] January 25, 2023 6:23 PM

## 2023-01-26 NOTE — TELEPHONE ENCOUNTER
"Patient calling in requesting a call back from provider re: potassium result. Writer unable to review and result note from most recent potassium result other than \"see telephone encounter.\" Provider had attempted to reach out to patient but patient was unavailable.    Patient asking for call back, states the number is his cell number but provider can leave a detailed message on his voicemail if he does not .      Routing to provider to review and advise.      LYNDSEY PowellN, RN  [facility]    "

## 2023-01-29 DIAGNOSIS — E87.6 HYPOKALEMIA: ICD-10-CM

## 2023-01-30 RX ORDER — POTASSIUM CHLORIDE 750 MG/1
20 TABLET, EXTENDED RELEASE ORAL DAILY
Qty: 10 TABLET | Refills: 0 | OUTPATIENT
Start: 2023-01-30

## 2023-01-30 NOTE — TELEPHONE ENCOUNTER
Called and left a voicemail message to return our call to schedule a lab only appt.  Jodi Hampton MA  [facility]  2nd Floor  Primary Care

## 2023-02-01 ENCOUNTER — TELEPHONE (OUTPATIENT)
Dept: OTHER | Facility: CLINIC | Age: 63
End: 2023-02-01
Payer: COMMERCIAL

## 2023-02-08 DIAGNOSIS — M54.16 LUMBAR RADICULOPATHY: ICD-10-CM

## 2023-02-08 DIAGNOSIS — M99.04 SACROILIAC JOINT SOMATIC DYSFUNCTION: ICD-10-CM

## 2023-02-08 NOTE — TELEPHONE ENCOUNTER
Pending Prescriptions:                       Disp   Refills    predniSONE (DELTASONE) 10 MG tablet       30 tab*0            Sig: Take 3 tablets (30 mg) by mouth 2 times daily

## 2023-02-10 RX ORDER — PREDNISONE 10 MG/1
30 TABLET ORAL 2 TIMES DAILY
Qty: 30 TABLET | Refills: 0 | OUTPATIENT
Start: 2023-02-10

## 2023-02-10 NOTE — TELEPHONE ENCOUNTER
Denied. According to Page's note 1/17/23, if the prednisone didn't help when prescribed then, pt needs to f/u with spine specialist.

## 2023-03-14 ENCOUNTER — OFFICE VISIT (OUTPATIENT)
Dept: OTHER | Facility: CLINIC | Age: 63
End: 2023-03-14
Attending: NURSE PRACTITIONER
Payer: COMMERCIAL

## 2023-03-14 VITALS
DIASTOLIC BLOOD PRESSURE: 117 MMHG | HEIGHT: 69 IN | SYSTOLIC BLOOD PRESSURE: 188 MMHG | WEIGHT: 194.4 LBS | OXYGEN SATURATION: 97 % | HEART RATE: 83 BPM | BODY MASS INDEX: 28.79 KG/M2

## 2023-03-14 DIAGNOSIS — M79.89 RIGHT LEG SWELLING: Primary | ICD-10-CM

## 2023-03-14 DIAGNOSIS — R20.2 TINGLING: ICD-10-CM

## 2023-03-14 DIAGNOSIS — F10.20 UNCOMPLICATED ALCOHOL DEPENDENCE (H): ICD-10-CM

## 2023-03-14 DIAGNOSIS — G89.29 CHRONIC LOW BACK PAIN, UNSPECIFIED BACK PAIN LATERALITY, UNSPECIFIED WHETHER SCIATICA PRESENT: ICD-10-CM

## 2023-03-14 DIAGNOSIS — R09.89 DECREASED PEDAL PULSES: ICD-10-CM

## 2023-03-14 DIAGNOSIS — R71.8 ELEVATED MCV: ICD-10-CM

## 2023-03-14 DIAGNOSIS — I10 BENIGN ESSENTIAL HYPERTENSION: ICD-10-CM

## 2023-03-14 DIAGNOSIS — M54.50 CHRONIC LOW BACK PAIN, UNSPECIFIED BACK PAIN LATERALITY, UNSPECIFIED WHETHER SCIATICA PRESENT: ICD-10-CM

## 2023-03-14 DIAGNOSIS — E87.6 HYPOKALEMIA: ICD-10-CM

## 2023-03-14 PROCEDURE — G0463 HOSPITAL OUTPT CLINIC VISIT: HCPCS

## 2023-03-14 PROCEDURE — 99205 OFFICE O/P NEW HI 60 MIN: CPT | Performed by: INTERNAL MEDICINE

## 2023-03-14 RX ORDER — POTASSIUM CHLORIDE 1500 MG/1
20 TABLET, EXTENDED RELEASE ORAL DAILY
Qty: 30 TABLET | Refills: 1 | Status: SHIPPED | OUTPATIENT
Start: 2023-03-14 | End: 2023-08-08

## 2023-03-14 NOTE — PROGRESS NOTES
[facility] INITIAL VASCULAR MEDICINE CONSULT    ( New patient visit)       PRIMARY HEALTH CARE PROVIDER:  Aris Garcia MD      REFERRING HEALTH CARE PROVIDER;  VANGIE Rea CNP      REASON FOR CONSULT: Evaluation and management of vascular causes of chronic right leg swelling more than 5 months with tingling numbness of the feet in a male non-smoker with history of hypertension and hypokalemia and also hyperlipidemia.      HPI: Ed Rudolph is a 62 year old very pleasant -American male with history of hypertension and hypokalemia taking lisinopril with hydrochlorothiazide and he has a hypokalemia and currently not on any replacement recent potassium was 3.0 and he drinks 1 pint of alcohol daily developed right leg swelling and previous venous duplex negative  for DVT.  He has a longstanding history of chronic low back problems and multilevel degenerative disc disease underwent periodic steroid injections.  Previous blood pressures are good range today blood pressure is high he says he missed the medication and also missing simvastatin as well.  He has a 2 jobs 1 is a sitting job as a  and other 1 works in the [employer] and mostly standing job.  No injury.  No history of a DVT or PE.  He never smoked, no drug use.    He is new to this clinic reviewed available extensive records in the epic and updated chart      PAST MEDICAL HISTORY  Past Medical History:   Diagnosis Date     Adjustment disorder with depressed mood 1/12/2016     Carpal tunnel syndrome 1/5/2007     Chest wall contusion 12/2/2015     Depression with suicidal ideation 12/21/2015     Erectile Dysfunction 1/5/2007     Hyperlipidemia LDL goal <130 10/31/2010     Hypertension goal BP (blood pressure) < 140/90 5/11/2011     Impaired fasting glucose      LEFT FRONTAL LOBE ABSCESS 1/03    treated surgically, resolved     LFT elevation 5/30/2012        CURRENT MEDICATIONS  lisinopril-hydrochlorothiazide (ZESTORETIC) 20-25 MG tablet, Take 1 tablet by mouth daily for blood pressure.  Multiple Vitamin (DAILY VITAMINS PO), Take 1 tablet by mouth daily   predniSONE (DELTASONE) 10 MG tablet, Take 3 tablets (30 mg) by mouth 2 times daily  simvastatin (ZOCOR) 40 MG tablet, TAKE 1 TABLET(40 MG) BY MOUTH EVERY EVENING FOR CHOLESTEROL  traZODone (DESYREL) 50 MG tablet, TAKE 1 TABLET(50 MG) BY MOUTH AT BEDTIME FOR SLEEP    No current facility-administered medications on file prior to visit.      PAST SURGICAL HISTORY:  Past Surgical History:   Procedure Laterality Date     COLONOSCOPY WITH CO2 INSUFFLATION N/A 4/12/2018    Procedure: COLONOSCOPY WITH CO2 INSUFFLATION;  COLON SCREEN/ ARREDONDO;  Surgeon: Quintin Patton MD;  Location: [facility] OR     CRANIOTOMY  01/2003    x 2, for treatment of brain abscess     INJECT EPIDURAL TRANSFORAMINAL Right 10/15/2021    Procedure: INJECTION, EPIDURAL, TRANSFORAMINAL APPROACH - Right L5-S1 TFESI;  Surgeon: Juan Drew MD;  Location: [facility] OR       ALLERGIES   No Known Allergies    FAMILY HISTORY  Family History   Adopted: Yes   Problem Relation Age of Onset     Family History Negative No family hx of         Unknown, adopted       VASCULAR FAMILY HISTORY  1st order relative with atherosclerotic PAD: No  1st order relative with AAA: No  Family history of Familial Hyperlipidemia No  Family History of Hypercoagulable state:No    VASCULAR RISK FACTORS  1. Diabetes:No   2. Smoking: has never smoked.  3. HTN: uncontrolled  4.Hyperlipidemia: Yes - uncontrolled      SOCIAL HISTORY  Social History     Socioeconomic History     Marital status: Single     Spouse name: Not on file     Number of children: 0     Years of education: Not on file     Highest education level: Not on file   Occupational History     Occupation:      Employer: [employer]   Tobacco Use     Smoking status: Never     Smokeless tobacco: Never    Vaping Use     Vaping Use: Never used   Substance and Sexual Activity     Alcohol use: Yes     Alcohol/week: 14.0 standard drinks     Types: 14 Standard drinks or equivalent per week     Comment: daily     Drug use: No     Sexual activity: Yes     Partners: Female     Birth control/protection: None   Other Topics Concern      Service Not Asked     Blood Transfusions Not Asked     Caffeine Concern Yes     Comment: Coffee 2 cups per day     Occupational Exposure Not Asked     Hobby Hazards Not Asked     Sleep Concern Not Asked     Stress Concern Not Asked     Weight Concern Not Asked     Special Diet Not Asked     Back Care Not Asked     Exercise Yes     Comment: 3     Bike Helmet Not Asked     Seat Belt Not Asked     Self-Exams Not Asked   Social History Narrative     Not on file     Social Determinants of Health     Financial Resource Strain: Not on file   Food Insecurity: Not on file   Transportation Needs: Not on file   Physical Activity: Not on file   Stress: Not on file   Social Connections: Not on file   Intimate Partner Violence: Not on file   Housing Stability: Not on file       ROS:   General: No change in weight, sleep or appetite.  Normal energy.  No fever or chills  Eyes: Negative for vision changes or eye problems  ENT: No problems with ears, nose or throat.  No difficulty swallowing.  Resp: No coughing, wheezing or shortness of breath  CV: No chest pains or palpitations  GI: No nausea, vomiting,  heartburn, abdominal pain, diarrhea, constipation or change in bowel habits  : No urinary frequency or dysuria, bladder or kidney problems  Musculoskeletal: No significant muscle or joint pains  Neurologic: No headaches, numbness, tingling, weakness, problems with balance or coordination  Psychiatric: No problems with anxiety, depression or mental health  Heme/immune/allergy: No history of bleeding or clotting problems or anemia.  No allergies or immune system problems  Endocrine: No history of  "thyroid disease, diabetes or other endocrine disorders  Skin: No rashes,worrisome lesions or skin problems  Vascular: Tingling numbness of right leg more than left side  Right leg is larger than left for few months  Previous venous duplex negative for DVT  Chronic back issues gets periodic steroid injections      EXAM:  BP (!) 188/117 (BP Location: Left arm, Patient Position: Chair, Cuff Size: Adult Regular)   Pulse 83   Ht 5' 9\" (1.753 m)   Wt 194 lb 6.4 oz (88.2 kg)   SpO2 97%   BMI 28.71 kg/m    In general, the patient is a pleasant male in no apparent distress.    HEENT: NC/AT.  PERRLA.  EOMI.  Sclerae white, not injected.  Nares clear.  Pharynx without erythema or exudate.  Dentition intact.    Neck: No adenopathy.  No thyromegaly. Carotids +2/2 bilaterally without bruits.  No jugular venous distension.   Heart: RRR. Normal S1, S2 splits physiologically. No murmur, rub, click, or gallop. The PMI is in the 5th ICS in the midclavicular line. There is no heave.    Lungs: CTA.  No ronchi, wheezes, rales.  No dullness to percussion.   Abdomen: Soft, nontender, nondistended. No organomegaly. No AAA.  No bruits.   Extremities: Vascular minimal varicose veins in both lower extremities right more than left side  Decreased pedal pulses specifically PT pulses  No foot ulcers or leg ulcers  No tissue loss  Right leg is larger than left leg  Motor or sensory function is normal      Labs:  LIPID RESULTS:  Lab Results   Component Value Date    CHOL 247 (H) 01/03/2022    CHOL 167 10/15/2020    HDL 58 01/03/2022    HDL 67 10/15/2020     (H) 01/03/2022    LDL 74 10/15/2020    TRIG 205 (H) 01/03/2022    TRIG 131 10/15/2020    CHOLHDLRATIO 4.3 02/10/2015       LIVER ENZYME RESULTS:  Lab Results   Component Value Date    AST 68 (H) 01/17/2023    AST 41 10/15/2020    ALT 39 01/17/2023    ALT 49 10/15/2020       CBC RESULTS:  Lab Results   Component Value Date    WBC 4.2 01/17/2023    WBC 5.2 10/15/2020    RBC 3.55 (L) " 01/17/2023    RBC 3.04 (L) 10/15/2020    HGB 12.4 (L) 01/17/2023    HGB 10.9 (L) 10/15/2020    HCT 37.2 (L) 01/17/2023    HCT 32.4 (L) 10/15/2020     (H) 01/17/2023     (H) 10/15/2020    MCH 34.9 (H) 01/17/2023    MCH 35.9 (H) 10/15/2020    MCHC 33.3 01/17/2023    MCHC 33.6 10/15/2020    RDW 13.9 01/17/2023    RDW 13.9 10/15/2020     01/17/2023     10/15/2020       BMP RESULTS:  Lab Results   Component Value Date     01/17/2023     10/15/2020    POTASSIUM 3.0 (L) 01/24/2023    POTASSIUM 3.6 10/15/2020    CHLORIDE 98 01/17/2023    CHLORIDE 104 10/15/2020    CO2 25 01/17/2023    CO2 28 10/15/2020    ANIONGAP 16 (H) 01/17/2023    ANIONGAP 5 10/15/2020    GLC 87 01/17/2023    GLC 79 10/15/2020    BUN 12 01/17/2023    BUN 18 10/15/2020    CR 0.68 01/17/2023    CR 0.90 10/15/2020    GFRESTIMATED >90 01/17/2023    GFRESTIMATED >90 10/15/2020    GFRESTBLACK >90 10/15/2020    JUVENCIO 9.3 01/17/2023    JUVENCIO 8.9 10/15/2020        A1C RESULTS:  Lab Results   Component Value Date    A1C 6.0 03/27/2006       THYROID RESULTS:  Lab Results   Component Value Date    TSH 1.75 01/17/2023    TSH 1.60 02/06/2018       Procedures:       Addendum: There is no obvious soft tissue abnormality identified in  the area of the lump near the ankle.     LEATHA WHYTE MD         SYSTEM ID:  [id number]   Addended by Leatha Whyte MD on 6/13/2022  2:47 PM     Study Result    Narrative & Impression   Exam: Ultrasound of the deep venous system of right leg dated  6/13/2022 2:08 PM     Clinical information: Anterolateral right calf pain for one to two  weeks.     Comparison: 10/21/2020     Ordering provider: ANTELMO WILSON     Technique: Gray-scale evaluation with compression and Doppler  assessment of deep venous system for spontaneous and phasic flow, as  well as the presence of distal augmentation. Color flow images  obtained as needed. Gray-scale images with compression of the  "great  saphenous vein obtained as needed.     Findings:     Right leg:     CFV: Thrombus: No, Phasic: Yes  Femoral vein, proximal: Thrombus: No, Phasic: Yes  Femoral vein, mid: Thrombus: No, Phasic: Yes  Femoral vein, distal: Thrombus: No, Phasic: Yes  Popliteal vein: Thrombus: No, Phasic: Yes  PTV: Thrombus: No  Peroneal vein: Thrombus: No     Left leg:     CFV: Thrombus: No, Phasic: Yes                                                                      Impression:     Right leg: No deep venous thrombosis.      Reference: \"Duplex Ultrasound in the Diagnosis of Lower-Extremity Deep  Venous Thrombosis\"- Brinda Reyes MD, S; Maury Frye MD  (Circulation. 2014;129:917-921. http://circ.ahajournals.org )     JALEN ASENCIO         SYSTEM ID:  [id number]         Assessment and Plan:     1. Right leg swelling  Varicose veins right more than left:    This is a very pleasant -American male with minimal varicose veins right more than left developed right leg swelling and pain discomfort tingling numbness for more than 5 months previous venous duplex negative to he also has a chronic low back issues.  He has decreased pedal pulses as well  His symptoms appears multifactorial with alcohol related chronic back issues with elevated MCV and low potassium  See below  Will get right lower extremity venous duplex to rule out DVT    - Vascular Surgery Referral  - US ROBERTO CARLOS Doppler with Exercise Bilateral; Future    2. Benign essential hypertension    3. Hypokalemia    Blood pressure is very high today previous blood pressures are good range he says he missed the medication  He also has a hypokalemia last potassium was 3.0  Educated patient on consuming the potassium rich foods and suggested to take medications as prescribed and told him to take blood pressure medication as soon as he goes home and monitor at home  Avoid NSAIDs  Stop drinking alcohol  If persistently elevated blood pressure talk to the primary care " physician  Replace the potassium 20 mEq sent to the pharmacy  DASH diet discussed with the patient  We will check comprehensive metabolic panel next week sometime  - potassium chloride ER (KLOR-CON M) 20 MEQ CR tablet; Take 1 tablet (20 mEq) by mouth daily  Dispense: 30 tablet; Refill: 1  - Magnesium; Future  - Comprehensive metabolic panel; Future    4. Chronic low back pain  Reviewed previous MRI as a multilevel DJD and getting periodic lumbar spine injections continue the same      5. Decreased pedal pulses  He has excellent palpable DP pulses bilaterally but PT pulses are decreased bilaterally with mono-biphasic pulses  No foot ulcers or leg ulcers  He has a pain in the right leg sitting, standing, walking???  Unclear if this is vascular related appears musculoskeletal related  We will get the ROBERTO CARLOS with exercise and also ultrasound lower extremity venous duplex  Order placed      6. Elevated MCV  7. Tingling  8.  Alcohol dependence (H)    He is tingling numbness elevated MCV with 1 pint of alcohol daily probably contributing  Will check blood morphology B12, folate and also potassium magnesium level  Educated and suggested complete cessation of the alcohol  Take multivitamin tablet daily  Lab orders placed    - Vitamin B12; Future  - Folate; Future  - Lab Blood Morphology Pathologist Review; Future      More than 60 minutes spent on the date of the encounter doing chart review, history and exam, documentation, and further activities as noted above.  He is new to this clinic reviewed available extensive records in the epic and updated chart.  AVS with written instructions given    This note was dictated by utilizing Dragon software  Thank you for the consultation    Copy of this note to primary care physician and referring provider      Meghna Galaviz MD, FAHA, FSVM, FNLA, FACP  Vascular medicine  Clinical hypertension specialist  Clinical lipidologist

## 2023-03-14 NOTE — PROGRESS NOTES
"Patient is here to discuss consult    BP (!) 159/87 (BP Location: Right arm, Patient Position: Chair, Cuff Size: Adult Regular)   Pulse 86   Ht 5' 9\" (1.753 m)   Wt 194 lb 6.4 oz (88.2 kg)   SpO2 97%   BMI 28.71 kg/m      Questions patient would like addressed today are: N/A.    Refills are needed: No    Has homecare services and agency name:  [facility]    "

## 2023-03-14 NOTE — PATIENT INSTRUCTIONS
Please take multivitamin tablet daily     potasium 20 meq daily New Rx sent     Go for labs and leg ultrasound order placed our staff will schedule     Quit alcohol     Take BP and cholesterol pill as prescribed and as soon as you go home take BP pill.    Follow up in a month virtual visit

## 2023-03-15 ENCOUNTER — TELEPHONE (OUTPATIENT)
Dept: OTHER | Facility: CLINIC | Age: 63
End: 2023-03-15
Payer: COMMERCIAL

## 2023-03-15 NOTE — TELEPHONE ENCOUNTER
Follow-up to 3/14/23      Non-fasting labs    RLE Venous US    ROBERTO CARLOS with exercise    Virtual visit one week later.    Patient would likely prefer [facility] for imaging & labs.

## 2023-03-16 NOTE — TELEPHONE ENCOUNTER
Left voicemail with instructions for patient to call back to schedule their appointment(s)    March 16, 2023 , 9:18 AM

## 2023-03-19 ENCOUNTER — OFFICE VISIT (OUTPATIENT)
Dept: URGENT CARE | Facility: URGENT CARE | Age: 63
End: 2023-03-19
Payer: COMMERCIAL

## 2023-03-19 VITALS
TEMPERATURE: 96.9 F | RESPIRATION RATE: 16 BRPM | DIASTOLIC BLOOD PRESSURE: 74 MMHG | BODY MASS INDEX: 27.3 KG/M2 | HEART RATE: 91 BPM | SYSTOLIC BLOOD PRESSURE: 115 MMHG | WEIGHT: 184.9 LBS | OXYGEN SATURATION: 97 %

## 2023-03-19 DIAGNOSIS — R22.0 LEFT FACIAL SWELLING: Primary | ICD-10-CM

## 2023-03-19 PROCEDURE — 99213 OFFICE O/P EST LOW 20 MIN: CPT | Performed by: PHYSICIAN ASSISTANT

## 2023-03-19 ASSESSMENT — ENCOUNTER SYMPTOMS
SHORTNESS OF BREATH: 0
MUSCULOSKELETAL NEGATIVE: 1
VOMITING: 0
SORE THROAT: 0
PALPITATIONS: 0
FEVER: 0
CHILLS: 0
CARDIOVASCULAR NEGATIVE: 1
ABDOMINAL PAIN: 0
COUGH: 0
MYALGIAS: 0
DIARRHEA: 0
HEADACHES: 0
GASTROINTESTINAL NEGATIVE: 1
CHEST TIGHTNESS: 0
NAUSEA: 0
ALLERGIC/IMMUNOLOGIC NEGATIVE: 1
NEUROLOGICAL NEGATIVE: 1
WHEEZING: 0
RESPIRATORY NEGATIVE: 1
FACIAL SWELLING: 1
DYSURIA: 0
FREQUENCY: 0
CONSTITUTIONAL NEGATIVE: 1
HEMATURIA: 0

## 2023-03-21 NOTE — TELEPHONE ENCOUNTER
Left message for patient stating he was scheduled for his imaging incorrectly by our centralized scheduling department.  I asked that he call us back so we can reschedule his appointments and add the additional appointments he needs scheduled.

## 2023-03-28 ENCOUNTER — LAB (OUTPATIENT)
Dept: LAB | Facility: CLINIC | Age: 63
End: 2023-03-28
Payer: COMMERCIAL

## 2023-03-28 DIAGNOSIS — I10 BENIGN ESSENTIAL HYPERTENSION: ICD-10-CM

## 2023-03-28 DIAGNOSIS — R71.8 ELEVATED MCV: ICD-10-CM

## 2023-03-28 DIAGNOSIS — E87.6 HYPOKALEMIA: ICD-10-CM

## 2023-03-28 LAB
ALBUMIN SERPL-MCNC: 4 G/DL (ref 3.4–5)
ALP SERPL-CCNC: 113 U/L (ref 40–150)
ALT SERPL W P-5'-P-CCNC: 60 U/L (ref 0–70)
ANION GAP SERPL CALCULATED.3IONS-SCNC: 8 MMOL/L (ref 3–14)
AST SERPL W P-5'-P-CCNC: 101 U/L (ref 0–45)
BASOPHILS # BLD AUTO: 0 10E3/UL (ref 0–0.2)
BASOPHILS NFR BLD AUTO: 1 %
BILIRUB SERPL-MCNC: 0.4 MG/DL (ref 0.2–1.3)
BUN SERPL-MCNC: 31 MG/DL (ref 7–30)
CALCIUM SERPL-MCNC: 9.6 MG/DL (ref 8.5–10.1)
CHLORIDE BLD-SCNC: 104 MMOL/L (ref 94–109)
CO2 SERPL-SCNC: 24 MMOL/L (ref 20–32)
CREAT SERPL-MCNC: 1.08 MG/DL (ref 0.66–1.25)
EOSINOPHIL # BLD AUTO: 0.1 10E3/UL (ref 0–0.7)
EOSINOPHIL NFR BLD AUTO: 2 %
ERYTHROCYTE [DISTWIDTH] IN BLOOD BY AUTOMATED COUNT: 12.9 % (ref 10–15)
FOLATE SERPL-MCNC: 4.6 NG/ML (ref 4.6–34.8)
GFR SERPL CREATININE-BSD FRML MDRD: 78 ML/MIN/1.73M2
GLUCOSE BLD-MCNC: 105 MG/DL (ref 70–99)
HCT VFR BLD AUTO: 39.5 % (ref 40–53)
HGB BLD-MCNC: 13.2 G/DL (ref 13.3–17.7)
IMM GRANULOCYTES # BLD: 0 10E3/UL
IMM GRANULOCYTES NFR BLD: 0 %
LYMPHOCYTES # BLD AUTO: 1.8 10E3/UL (ref 0.8–5.3)
LYMPHOCYTES NFR BLD AUTO: 42 %
MAGNESIUM SERPL-MCNC: 2.3 MG/DL (ref 1.6–2.3)
MCH RBC QN AUTO: 36.3 PG (ref 26.5–33)
MCHC RBC AUTO-ENTMCNC: 33.4 G/DL (ref 31.5–36.5)
MCV RBC AUTO: 109 FL (ref 78–100)
MONOCYTES # BLD AUTO: 0.7 10E3/UL (ref 0–1.3)
MONOCYTES NFR BLD AUTO: 15 %
NEUTROPHILS # BLD AUTO: 1.8 10E3/UL (ref 1.6–8.3)
NEUTROPHILS NFR BLD AUTO: 40 %
PLATELET # BLD AUTO: 221 10E3/UL (ref 150–450)
POTASSIUM BLD-SCNC: 4.1 MMOL/L (ref 3.4–5.3)
PROT SERPL-MCNC: 8.1 G/DL (ref 6.8–8.8)
RBC # BLD AUTO: 3.64 10E6/UL (ref 4.4–5.9)
RETICS # AUTO: 0.04 10E6/UL (ref 0.03–0.1)
RETICS/RBC NFR AUTO: 1.2 % (ref 0.5–2)
SODIUM SERPL-SCNC: 136 MMOL/L (ref 133–144)
VIT B12 SERPL-MCNC: 494 PG/ML (ref 232–1245)
WBC # BLD AUTO: 4.4 10E3/UL (ref 4–11)

## 2023-03-28 PROCEDURE — 85025 COMPLETE CBC W/AUTO DIFF WBC: CPT

## 2023-03-28 PROCEDURE — 80053 COMPREHEN METABOLIC PANEL: CPT

## 2023-03-28 PROCEDURE — 85045 AUTOMATED RETICULOCYTE COUNT: CPT

## 2023-03-28 PROCEDURE — 83735 ASSAY OF MAGNESIUM: CPT

## 2023-03-28 PROCEDURE — 99207 BLOOD MORPHOLOGY PATHOLOGIST REVIEW: CPT | Performed by: PATHOLOGY

## 2023-03-28 PROCEDURE — 82746 ASSAY OF FOLIC ACID SERUM: CPT

## 2023-03-28 PROCEDURE — 36415 COLL VENOUS BLD VENIPUNCTURE: CPT

## 2023-03-28 PROCEDURE — 82607 VITAMIN B-12: CPT

## 2023-03-29 ENCOUNTER — HOSPITAL ENCOUNTER (OUTPATIENT)
Dept: ULTRASOUND IMAGING | Facility: CLINIC | Age: 63
Discharge: HOME | End: 2023-03-29
Attending: INTERNAL MEDICINE
Payer: COMMERCIAL

## 2023-03-29 DIAGNOSIS — R09.89 DECREASED PEDAL PULSES: ICD-10-CM

## 2023-03-29 DIAGNOSIS — M79.89 RIGHT LEG SWELLING: ICD-10-CM

## 2023-03-29 LAB
PATH REPORT.COMMENTS IMP SPEC: NORMAL
PATH REPORT.FINAL DX SPEC: NORMAL
PATH REPORT.MICROSCOPIC SPEC OTHER STN: NORMAL
PATH REPORT.MICROSCOPIC SPEC OTHER STN: NORMAL

## 2023-03-29 PROCEDURE — 93971 EXTREMITY STUDY: CPT | Mod: RT

## 2023-03-29 PROCEDURE — 93924 LWR XTR VASC STDY BILAT: CPT

## 2023-04-16 ENCOUNTER — HEALTH MAINTENANCE LETTER (OUTPATIENT)
Age: 63
End: 2023-04-16

## 2023-05-25 ENCOUNTER — OFFICE VISIT (OUTPATIENT)
Dept: URGENT CARE | Facility: URGENT CARE | Age: 63
End: 2023-05-25

## 2023-05-25 VITALS
DIASTOLIC BLOOD PRESSURE: 70 MMHG | TEMPERATURE: 98.8 F | WEIGHT: 182 LBS | BODY MASS INDEX: 26.88 KG/M2 | OXYGEN SATURATION: 96 % | HEART RATE: 98 BPM | RESPIRATION RATE: 16 BRPM | SYSTOLIC BLOOD PRESSURE: 107 MMHG

## 2023-05-25 DIAGNOSIS — R22.0 LEFT FACIAL SWELLING: Primary | ICD-10-CM

## 2023-05-25 PROCEDURE — 99214 OFFICE O/P EST MOD 30 MIN: CPT | Performed by: PHYSICIAN ASSISTANT

## 2023-05-25 ASSESSMENT — ENCOUNTER SYMPTOMS
PALPITATIONS: 0
DYSURIA: 0
SORE THROAT: 0
FEVER: 0
ALLERGIC/IMMUNOLOGIC NEGATIVE: 1
CONSTITUTIONAL NEGATIVE: 1
FACIAL SWELLING: 1
WHEEZING: 0
RESPIRATORY NEGATIVE: 1
GASTROINTESTINAL NEGATIVE: 1
CARDIOVASCULAR NEGATIVE: 1
NEUROLOGICAL NEGATIVE: 1
CHILLS: 0
DIARRHEA: 0
HEMATURIA: 0
VOMITING: 0
HEADACHES: 0
MYALGIAS: 0
CHEST TIGHTNESS: 0
ABDOMINAL PAIN: 0
MUSCULOSKELETAL NEGATIVE: 1
FREQUENCY: 0
COUGH: 0
SHORTNESS OF BREATH: 0
NAUSEA: 0

## 2023-05-25 NOTE — PROGRESS NOTES
Chief Complaint:    Chief Complaint   Patient presents with     Urgent Care     Swelling     Per patient states on Wednesday morning his face swelled up, states he took some pills he got prescribed for facial swelling back 3/2023 given to him by  provider left work due to symptoms , states he has been taking the medication but now only has 1 pill left requesting refill as he has a part time job that he needs to start orientation      Medical Decision Making:    Vital signs reviewed by Shane Ho PA-C  /70   Pulse 98   Temp 98.8  F (37.1  C) (Tympanic)   Resp 16   Wt 82.6 kg (182 lb)   SpO2 96%   BMI 26.88 kg/m      ASSESSMENT:     1. Left facial swelling      PLAN:     Patient is in no acute distress.  He is afebrile with stable vital signs.  Rx for Augmentin.  Order placed for follow up with ENT for possible recurrent salivary gland issue.  Patient instructed to follow up with PCP in 1 week if symptoms are not improving.  Sooner if symptoms worsen.  Worrisome symptoms discussed with instructions to go to the ED.  Patient verbalized understanding and agreed with this plan.    Labs:     No results found for any visits on 05/25/23.    Current Meds:    Current Outpatient Medications:      amoxicillin-clavulanate (AUGMENTIN) 875-125 MG tablet, Take 1 tablet by mouth 2 times daily for 10 days, Disp: 20 tablet, Rfl: 0     lisinopril-hydrochlorothiazide (ZESTORETIC) 20-25 MG tablet, Take 1 tablet by mouth daily for blood pressure., Disp: 90 tablet, Rfl: 1     Multiple Vitamin (DAILY VITAMINS PO), Take 1 tablet by mouth daily , Disp: , Rfl:      potassium chloride ER (KLOR-CON M) 20 MEQ CR tablet, Take 1 tablet (20 mEq) by mouth daily, Disp: 30 tablet, Rfl: 1     predniSONE (DELTASONE) 10 MG tablet, Take 3 tablets (30 mg) by mouth 2 times daily, Disp: 30 tablet, Rfl: 0     simvastatin (ZOCOR) 40 MG tablet, TAKE 1 TABLET(40 MG) BY MOUTH EVERY EVENING FOR CHOLESTEROL, Disp: 90 tablet, Rfl: 1     traZODone  (DESYREL) 50 MG tablet, TAKE 1 TABLET(50 MG) BY MOUTH AT BEDTIME FOR SLEEP, Disp: 30 tablet, Rfl: 1    Allergies:  No Known Allergies    SUBJECTIVE    HPI: Ed Rudolph is an 63 year old male who presents for evaluation and treatment of Patient developed swelling on the left lower cheek 1 day ago. The area is not painful. No tooth/jaw pain or pain with chewing. No fevers/chills, difficulty with breathing.      Patient has had this same issue before. He notes that it happens 1-2 times per year and clears up well with Augmentin.  He did see his dentist who thinks this may be a salivary gland problem.     ROS:      Review of Systems   Constitutional: Negative.  Negative for chills and fever.   HENT: Positive for facial swelling. Negative for ear discharge, ear pain and sore throat.    Respiratory: Negative.  Negative for cough, chest tightness, shortness of breath and wheezing.    Cardiovascular: Negative.  Negative for chest pain and palpitations.   Gastrointestinal: Negative.  Negative for abdominal pain, diarrhea, nausea and vomiting.   Genitourinary: Negative for dysuria, frequency, hematuria and urgency.   Musculoskeletal: Negative.  Negative for myalgias.   Skin: Negative for rash.   Allergic/Immunologic: Negative.  Negative for immunocompromised state.   Neurological: Negative.  Negative for headaches.        Family History   Family History   Adopted: Yes   Problem Relation Age of Onset     Family History Negative No family hx of         Unknown, adopted       Social History  Social History     Socioeconomic History     Marital status: Single     Spouse name: Not on file     Number of children: 0     Years of education: Not on file     Highest education level: Not on file   Occupational History     Occupation:      Employer: [employer]   Tobacco Use     Smoking status: Never     Smokeless tobacco: Never   Vaping Use     Vaping status: Never Used   Substance and Sexual Activity      Alcohol use: Yes     Alcohol/week: 14.0 standard drinks of alcohol     Types: 14 Standard drinks or equivalent per week     Comment: daily     Drug use: No     Sexual activity: Yes     Partners: Female     Birth control/protection: None   Other Topics Concern      Service Not Asked     Blood Transfusions Not Asked     Caffeine Concern Yes     Comment: Coffee 2 cups per day     Occupational Exposure Not Asked     Hobby Hazards Not Asked     Sleep Concern Not Asked     Stress Concern Not Asked     Weight Concern Not Asked     Special Diet Not Asked     Back Care Not Asked     Exercise Yes     Comment: 3     Bike Helmet Not Asked     Seat Belt Not Asked     Self-Exams Not Asked   Social History Narrative     Not on file     Social Determinants of Health     Financial Resource Strain: Not on file   Food Insecurity: Not on file   Transportation Needs: Not on file   Physical Activity: Not on file   Stress: Not on file   Social Connections: Not on file   Intimate Partner Violence: Not on file   Housing Stability: Not on file        Surgical History:  Past Surgical History:   Procedure Laterality Date     COLONOSCOPY WITH CO2 INSUFFLATION N/A 4/12/2018    Procedure: COLONOSCOPY WITH CO2 INSUFFLATION;  COLON SCREEN/ ARREDONDO;  Surgeon: Quintin Patton MD;  Location: [facility] OR     CRANIOTOMY  01/2003    x 2, for treatment of brain abscess     INJECT EPIDURAL TRANSFORAMINAL Right 10/15/2021    Procedure: INJECTION, EPIDURAL, TRANSFORAMINAL APPROACH - Right L5-S1 TFESI;  Surgeon: Juan Drew MD;  Location: [facility] OR        Problem List:  Patient Active Problem List   Diagnosis     Erectile Dysfunction     Overweight (BMI 25.0-29.9)     Hyperlipidemia LDL goal <100     Essential hypertension with goal blood pressure less than 140/90     Impaired fasting glucose     High triglycerides     Prostatic nodule     Primary insomnia     Skin cyst     Elevated ferritin     Keloid scar, right neck     Fall     Major depressive  disorder, recurrent episode (H)     Slac (scapholunate advanced collapse) of wrist, left     Alcohol dependence (H)     Lumbar radiculitis           OBJECTIVE:     Vital signs noted and reviewed by Shane Ho PA-C  /70   Pulse 98   Temp 98.8  F (37.1  C) (Tympanic)   Resp 16   Wt 82.6 kg (182 lb)   SpO2 96%   BMI 26.88 kg/m       PEFR:    Physical Exam  Vitals and nursing note reviewed.   Constitutional:       General: He is not in acute distress.     Appearance: He is well-developed. He is not ill-appearing, toxic-appearing or diaphoretic.   HENT:      Head: Normocephalic and atraumatic.      Jaw: Swelling present. No tenderness or pain on movement.        Right Ear: Hearing, tympanic membrane, ear canal and external ear normal. Tympanic membrane is not perforated, erythematous, retracted or bulging.      Left Ear: Hearing, tympanic membrane, ear canal and external ear normal. Tympanic membrane is not perforated, erythematous, retracted or bulging.      Nose: Nose normal. No mucosal edema, congestion or rhinorrhea.      Mouth/Throat:      Pharynx: No oropharyngeal exudate or posterior oropharyngeal erythema.      Tonsils: No tonsillar exudate or tonsillar abscesses. 0 on the right. 0 on the left.   Eyes:      Pupils: Pupils are equal, round, and reactive to light.   Cardiovascular:      Rate and Rhythm: Normal rate and regular rhythm.      Heart sounds: Normal heart sounds, S1 normal and S2 normal. Heart sounds not distant. No murmur heard.     No friction rub. No gallop.   Pulmonary:      Effort: Pulmonary effort is normal. No respiratory distress.      Breath sounds: Normal breath sounds. No decreased breath sounds, wheezing, rhonchi or rales.   Abdominal:      General: Bowel sounds are normal. There is no distension.      Palpations: Abdomen is soft.      Tenderness: There is no abdominal tenderness.   Musculoskeletal:      Cervical back: Normal range of motion and neck supple.    Lymphadenopathy:      Cervical: No cervical adenopathy.   Skin:     General: Skin is warm and dry.      Findings: No rash.   Neurological:      Mental Status: He is alert.      Cranial Nerves: No cranial nerve deficit.   Psychiatric:         Attention and Perception: He is attentive.         Speech: Speech normal.         Behavior: Behavior normal. Behavior is cooperative.         Thought Content: Thought content normal.         Judgment: Judgment normal.             Shane Ho PA-C  5/25/2023, 4:18 PM

## 2023-05-26 ENCOUNTER — TELEPHONE (OUTPATIENT)
Dept: OTOLARYNGOLOGY | Facility: CLINIC | Age: 63
End: 2023-05-26

## 2023-05-26 NOTE — TELEPHONE ENCOUNTER
Patient scheduled to see Dr. Pryor Friday July 21, 2023 at 8:00 am in [place]. Patient notified via [employer].     Francisco Javier Antonio RN on 5/26/2023 at 2:08 PM

## 2023-05-26 NOTE — TELEPHONE ENCOUNTER
Per Dr. Simpson  He was placed on antibiotic treatment so there is nothing else that we would do at this time unless he is having symptomatic worsening, in which case he should go to the emergency department.  I think next available is appropriate for recurrent sialoadenitis.     IJL

## 2023-05-26 NOTE — TELEPHONE ENCOUNTER
[facility]    Phone Message    May a detailed message be left on voicemail: no     Reason for Call: Appointment Intake    Referring Provider Name: Shane Ho PA-C  Diagnosis and/or Symptoms: Left sided facial swelling    Urgent referral, per Erica at [facility], needs to be reviewed by team. Unable to reach pt. [place] or [place] is closest    Action Taken: Message routed to:  Other: [place] [facility]    Travel Screening: Not Applicable

## 2023-08-08 DIAGNOSIS — E87.6 HYPOKALEMIA: ICD-10-CM

## 2023-08-08 RX ORDER — POTASSIUM CHLORIDE 1500 MG/1
20 TABLET, EXTENDED RELEASE ORAL DAILY
Qty: 30 TABLET | Refills: 1 | Status: SHIPPED | OUTPATIENT
Start: 2023-08-08 | End: 2024-01-18

## 2023-08-08 NOTE — TELEPHONE ENCOUNTER
Unable to refill per [facility] protocol.  Med and pharm loaded.  LOV does not state duration patient was to stay on Potassium.    Alexandra KAN, RN    [facility]  Vascular [facility]  Office: [phone]  Fax: [phone]

## 2023-09-21 DIAGNOSIS — F51.01 PRIMARY INSOMNIA: ICD-10-CM

## 2023-09-21 DIAGNOSIS — F33.0 MAJOR DEPRESSIVE DISORDER, RECURRENT EPISODE, MILD (H): ICD-10-CM

## 2023-09-21 RX ORDER — TRAZODONE HYDROCHLORIDE 50 MG/1
TABLET, FILM COATED ORAL
Qty: 30 TABLET | Refills: 1 | Status: SHIPPED | OUTPATIENT
Start: 2023-09-21

## 2023-11-14 NOTE — PROGRESS NOTES
"Chief Complaint:    Chief Complaint   Patient presents with     Facial Swelling     Patient reports \"jaw infection\" in left side of face that started on Friday. Patient states he's had it before and that it \"flares up every now and then.\" Patient states that it is painful \"sometimes.\"      Refill Request     Patient requesting refill of prednisone for back spasms       Medical Decision Making:    Vital signs reviewed by Shane Ho PA-C  /74 (BP Location: Left arm, Patient Position: Sitting, Cuff Size: Adult Regular)   Pulse 91   Temp 96.9  F (36.1  C) (Tympanic)   Resp 16   Wt 83.9 kg (184 lb 14.4 oz)   SpO2 97%   BMI 27.30 kg/m      Differential Diagnosis: Facial swelling, dental abscess, sialoadenitis     ASSESSMENT:     1. Left facial swelling           PLAN:    Patient is in no acute distress.     Rx for Augmentin today as this has worked well in the past.    Patient instructed to follow up with dentist in 1 week if symptoms are not improving.  Sooner if symptoms worsen.  Worrisome symptoms discussed with instructions to go to the ED.  Patient verbalized understanding and agreed with this plan.    Labs:     No results found for any visits on 03/19/23.    Current Meds:    Current Outpatient Medications:      amoxicillin-clavulanate (AUGMENTIN) 875-125 MG tablet, Take 1 tablet by mouth 2 times daily for 7 days, Disp: 14 tablet, Rfl: 0     lisinopril-hydrochlorothiazide (ZESTORETIC) 20-25 MG tablet, Take 1 tablet by mouth daily for blood pressure., Disp: 90 tablet, Rfl: 1     Multiple Vitamin (DAILY VITAMINS PO), Take 1 tablet by mouth daily , Disp: , Rfl:      potassium chloride ER (KLOR-CON M) 20 MEQ CR tablet, Take 1 tablet (20 mEq) by mouth daily, Disp: 30 tablet, Rfl: 1     predniSONE (DELTASONE) 10 MG tablet, Take 3 tablets (30 mg) by mouth 2 times daily, Disp: 30 tablet, Rfl: 0     simvastatin (ZOCOR) 40 MG tablet, TAKE 1 TABLET(40 MG) BY MOUTH EVERY EVENING FOR CHOLESTEROL, Disp: 90 " tablet, Rfl: 1     traZODone (DESYREL) 50 MG tablet, TAKE 1 TABLET(50 MG) BY MOUTH AT BEDTIME FOR SLEEP, Disp: 30 tablet, Rfl: 1    Allergies:  No Known Allergies    SUBJECTIVE    HPI: Ed Rudolph is an 62 year old male who presents for evaluation and treatment of possible infection on the L cheek. Patient developed swelling on the left lower cheek on Friday. The area is not painful unless he yawns or opens the mouth widely. No tooth/jaw pain or pain with chewing. No fevers/chills, difficulty with breathing.     Of note, patient has had this same issue before. He notes that it happens 1-2 times per year and clears up well with Augmentin. He is going to work with his dentist and PCP to figure out what can be done about it to prevent it from recurring.    ROS:      Review of Systems   Constitutional: Negative.  Negative for chills and fever.   HENT: Positive for facial swelling. Negative for sore throat.    Respiratory: Negative.  Negative for cough, chest tightness, shortness of breath and wheezing.    Cardiovascular: Negative.  Negative for chest pain and palpitations.   Gastrointestinal: Negative.  Negative for abdominal pain, diarrhea, nausea and vomiting.   Genitourinary: Negative for dysuria, frequency, hematuria and urgency.   Musculoskeletal: Negative.  Negative for myalgias.   Skin: Negative for rash.   Allergic/Immunologic: Negative.  Negative for immunocompromised state.   Neurological: Negative.  Negative for headaches.        Family History   Family History   Adopted: Yes   Problem Relation Age of Onset     Family History Negative No family hx of         Unknown, adopted       Social History  Social History     Socioeconomic History     Marital status: Single     Spouse name: Not on file     Number of children: 0     Years of education: Not on file     Highest education level: Not on file   Occupational History     Occupation:      Employer: [employer]   Tobacco Use      Smoking status: Never     Smokeless tobacco: Never   Vaping Use     Vaping Use: Never used   Substance and Sexual Activity     Alcohol use: Yes     Alcohol/week: 14.0 standard drinks     Types: 14 Standard drinks or equivalent per week     Comment: daily     Drug use: No     Sexual activity: Yes     Partners: Female     Birth control/protection: None   Other Topics Concern      Service Not Asked     Blood Transfusions Not Asked     Caffeine Concern Yes     Comment: Coffee 2 cups per day     Occupational Exposure Not Asked     Hobby Hazards Not Asked     Sleep Concern Not Asked     Stress Concern Not Asked     Weight Concern Not Asked     Special Diet Not Asked     Back Care Not Asked     Exercise Yes     Comment: 3     Bike Helmet Not Asked     Seat Belt Not Asked     Self-Exams Not Asked   Social History Narrative     Not on file     Social Determinants of Health     Financial Resource Strain: Not on file   Food Insecurity: Not on file   Transportation Needs: Not on file   Physical Activity: Not on file   Stress: Not on file   Social Connections: Not on file   Intimate Partner Violence: Not on file   Housing Stability: Not on file        Surgical History:  Past Surgical History:   Procedure Laterality Date     COLONOSCOPY WITH CO2 INSUFFLATION N/A 4/12/2018    Procedure: COLONOSCOPY WITH CO2 INSUFFLATION;  COLON SCREEN/ ARREDONDO;  Surgeon: Quintin Patton MD;  Location:  OR     CRANIOTOMY  01/2003    x 2, for treatment of brain abscess     INJECT EPIDURAL TRANSFORAMINAL Right 10/15/2021    Procedure: INJECTION, EPIDURAL, TRANSFORAMINAL APPROACH - Right L5-S1 TFESI;  Surgeon: Juan Drew MD;  Location:  OR        Problem List:  Patient Active Problem List   Diagnosis     Erectile Dysfunction     Overweight (BMI 25.0-29.9)     Hyperlipidemia LDL goal <100     Essential hypertension with goal blood pressure less than 140/90     Impaired fasting glucose     High triglycerides     Prostatic nodule      Primary insomnia     Skin cyst     Elevated ferritin     Keloid scar, right neck     Fall     Major depressive disorder, recurrent episode (H)     Slac (scapholunate advanced collapse) of wrist, left     Alcohol dependence (H)     Lumbar radiculitis           OBJECTIVE:     Vital signs noted and reviewed by Shane Ho PA-C  /74 (BP Location: Left arm, Patient Position: Sitting, Cuff Size: Adult Regular)   Pulse 91   Temp 96.9  F (36.1  C) (Tympanic)   Resp 16   Wt 83.9 kg (184 lb 14.4 oz)   SpO2 97%   BMI 27.30 kg/m       PEFR:    Physical Exam  Vitals and nursing note reviewed.   Constitutional:       General: He is not in acute distress.     Appearance: He is well-developed. He is not ill-appearing, toxic-appearing or diaphoretic.   HENT:      Head: Normocephalic and atraumatic.      Jaw: Swelling present. No tenderness.        Right Ear: Hearing, tympanic membrane, ear canal and external ear normal. Tympanic membrane is not perforated, erythematous, retracted or bulging.      Left Ear: Hearing, tympanic membrane, ear canal and external ear normal. Tympanic membrane is not perforated, erythematous, retracted or bulging.      Nose: Nose normal. No mucosal edema, congestion or rhinorrhea.      Mouth/Throat:      Pharynx: No oropharyngeal exudate or posterior oropharyngeal erythema.      Tonsils: No tonsillar exudate or tonsillar abscesses. 0 on the right. 0 on the left.   Eyes:      Pupils: Pupils are equal, round, and reactive to light.   Cardiovascular:      Rate and Rhythm: Normal rate and regular rhythm.      Heart sounds: Normal heart sounds, S1 normal and S2 normal. Heart sounds not distant. No murmur heard.    No friction rub. No gallop.   Pulmonary:      Effort: Pulmonary effort is normal. No respiratory distress.      Breath sounds: Normal breath sounds. No decreased breath sounds, wheezing, rhonchi or rales.   Abdominal:      General: Bowel sounds are normal. There is no distension.       Palpations: Abdomen is soft.      Tenderness: There is no abdominal tenderness.   Musculoskeletal:      Cervical back: Normal range of motion and neck supple.   Lymphadenopathy:      Cervical: No cervical adenopathy.   Skin:     General: Skin is warm and dry.      Findings: No rash.   Neurological:      Mental Status: He is alert.      Cranial Nerves: No cranial nerve deficit.   Psychiatric:         Attention and Perception: He is attentive.         Speech: Speech normal.         Behavior: Behavior normal. Behavior is cooperative.         Thought Content: Thought content normal.         Judgment: Judgment normal.             Shane Ho PA-C  3/19/2023, 9:34 AM     Curvilinear Excision Additional Text (Leave Blank If You Do Not Want): The margin was drawn around the clinically apparent lesion.  A curvilinear shape was then drawn on the skin incorporating the lesion and margins.  Incisions were then made along these lines to the appropriate tissue plane and the lesion was extirpated.

## 2023-12-01 DIAGNOSIS — E87.6 HYPOKALEMIA: ICD-10-CM

## 2023-12-01 RX ORDER — POTASSIUM CHLORIDE 1500 MG/1
20 TABLET, EXTENDED RELEASE ORAL DAILY
Qty: 30 TABLET | Refills: 1 | OUTPATIENT
Start: 2023-12-01

## 2023-12-01 NOTE — TELEPHONE ENCOUNTER
potassium chloride ER (KLOR-CON M) 20 MEQ CR tablet   Last Written Prescription Date:  8/8/23  Last Fill Quantity: 30,  # refills: 1    Last visit with provider:  3/14/2023  Follow up recommended:  non fasting labs, RLE venous US, ROBERTO CARLOS w/exercise & virtual visit April 2023.  No-show 4/11/2023.      Rx request denied.

## 2024-01-09 ENCOUNTER — TELEPHONE (OUTPATIENT)
Dept: OTHER | Facility: CLINIC | Age: 64
End: 2024-01-09

## 2024-01-10 NOTE — TELEPHONE ENCOUNTER
Called patient to let him know that Dr. Galaviz would need to see him virtually to be able to prescribe more potassium.    Left Ed a voice message that he may either have a virtual visit with Dr. Galaviz at which time Dr. Galaviz can determine if a potassium refill is appropriate (and if any testing is necessary), or he may contact his primary care provider with this request.      
Spoke with patient and he preferred to see Dr. Galaviz in person and scheduled appointment.    Future Appointments   Date Time Provider Department Center   1/18/2024 10:30 AM Meghna Galaviz MD [facility]       
[facility]    Who is the name of the provider?:  RONEL GARZA   What is the location you see this provider at/preferred location?: [place]  Person calling / Facility: Ed Rudolph  Phone number:  [phone] (home)  Nurse call back needed:  Yes     Reason for call:  Ed needs his potassium refilled - I read in the past notes from Dec 2023 that hen eeds to have an office before this can happen-    Pharmacy location:     [id number] [address] AT [facility]    Outside Imaging: n/a   Can we leave a detailed message on this number?  YES     1/9/2024, 2:01 PM    
Parent

## 2024-01-18 ENCOUNTER — OFFICE VISIT (OUTPATIENT)
Dept: OTHER | Facility: CLINIC | Age: 64
End: 2024-01-18
Attending: INTERNAL MEDICINE

## 2024-01-18 VITALS
OXYGEN SATURATION: 98 % | HEIGHT: 70 IN | DIASTOLIC BLOOD PRESSURE: 74 MMHG | SYSTOLIC BLOOD PRESSURE: 115 MMHG | WEIGHT: 192.2 LBS | BODY MASS INDEX: 27.52 KG/M2 | HEART RATE: 83 BPM

## 2024-01-18 DIAGNOSIS — I10 BENIGN ESSENTIAL HYPERTENSION: Primary | ICD-10-CM

## 2024-01-18 DIAGNOSIS — F10.20 UNCOMPLICATED ALCOHOL DEPENDENCE (H): ICD-10-CM

## 2024-01-18 DIAGNOSIS — M79.89 LEG SWELLING: ICD-10-CM

## 2024-01-18 DIAGNOSIS — E87.6 HYPOKALEMIA: ICD-10-CM

## 2024-01-18 PROCEDURE — 99214 OFFICE O/P EST MOD 30 MIN: CPT | Performed by: INTERNAL MEDICINE

## 2024-01-18 PROCEDURE — 99213 OFFICE O/P EST LOW 20 MIN: CPT | Performed by: INTERNAL MEDICINE

## 2024-01-18 RX ORDER — POTASSIUM CHLORIDE 1500 MG/1
20 TABLET, EXTENDED RELEASE ORAL DAILY
Qty: 30 TABLET | Refills: 5 | Status: SHIPPED | OUTPATIENT
Start: 2024-01-18 | End: 2024-08-29

## 2024-01-18 NOTE — PROGRESS NOTES
"Patient is here to discuss FOLLOW UP    /74 (BP Location: Left arm, Patient Position: Chair, Cuff Size: Adult Regular)   Pulse 83   Ht 5' 10\" (1.778 m)   Wt 192 lb 3.2 oz (87.2 kg)   SpO2 98%   BMI 27.58 kg/m      Questions patient would like addressed today are: N/A.    Refills are needed: No    Has homecare services and agency name:  [facility] EW[facility]    "

## 2024-01-18 NOTE — PATIENT INSTRUCTIONS
Please go for BMP lab test , order placed     Refilled potasium    Continue rest of the medications same     Avoid alcohol     Monitor BP at home     Follow up with primary

## 2024-01-19 ENCOUNTER — TELEPHONE (OUTPATIENT)
Dept: OTHER | Facility: CLINIC | Age: 64
End: 2024-01-19

## 2024-01-19 NOTE — TELEPHONE ENCOUNTER
Patient needs to be scheduled for non fasting BMP lab at next available- will mychart results.    Alexandra KAN, RN    [facility]  Office: [phone]  Fax: [phone]

## 2024-01-19 NOTE — TELEPHONE ENCOUNTER
Left voicemail for patient to call back to schedule appointment(s), provided telephone number for patient to call back to schedule.

## 2024-01-23 NOTE — TELEPHONE ENCOUNTER
Left voicemail for patient to schedule appointment(s), stated this our 2nd attempt at scheduling and provided [facility] telephone number for patient to call back to schedule.

## 2024-02-07 DIAGNOSIS — I10 ESSENTIAL HYPERTENSION WITH GOAL BLOOD PRESSURE LESS THAN 140/90: ICD-10-CM

## 2024-02-08 DIAGNOSIS — I10 ESSENTIAL HYPERTENSION WITH GOAL BLOOD PRESSURE LESS THAN 140/90: ICD-10-CM

## 2024-02-08 RX ORDER — LISINOPRIL AND HYDROCHLOROTHIAZIDE 20; 25 MG/1; MG/1
1 TABLET ORAL DAILY
Qty: 30 TABLET | Refills: 0 | Status: SHIPPED | OUTPATIENT
Start: 2024-02-08 | End: 2024-06-05

## 2024-02-08 RX ORDER — LISINOPRIL AND HYDROCHLOROTHIAZIDE 20; 25 MG/1; MG/1
1 TABLET ORAL DAILY
Qty: 90 TABLET | OUTPATIENT
Start: 2024-02-08

## 2024-02-08 NOTE — TELEPHONE ENCOUNTER
Pharmacy requested duplicate. Medication refused.     Refill sent to the pharmacy today for 30 tablets. Patient is due for an appointment prior to additional refills.     Jacqueline Major RN

## 2024-02-08 NOTE — TELEPHONE ENCOUNTER
This last patient was seen by primary care over 1 year ago. I have not seen this patient since October 2020. Let patient know he needs to be seen in the office for further refills after this.

## 2024-05-18 ENCOUNTER — OFFICE VISIT (OUTPATIENT)
Dept: URGENT CARE | Facility: URGENT CARE | Age: 64
End: 2024-05-18

## 2024-05-18 VITALS
OXYGEN SATURATION: 95 % | WEIGHT: 193.8 LBS | BODY MASS INDEX: 27.81 KG/M2 | TEMPERATURE: 97.4 F | HEART RATE: 95 BPM | DIASTOLIC BLOOD PRESSURE: 74 MMHG | RESPIRATION RATE: 20 BRPM | SYSTOLIC BLOOD PRESSURE: 117 MMHG

## 2024-05-18 DIAGNOSIS — K11.21 ACUTE PAROTITIS: Primary | ICD-10-CM

## 2024-05-18 PROCEDURE — 99213 OFFICE O/P EST LOW 20 MIN: CPT | Performed by: PHYSICIAN ASSISTANT

## 2024-05-18 ASSESSMENT — ENCOUNTER SYMPTOMS
COUGH: 0
VOMITING: 0
SHORTNESS OF BREATH: 0
RHINORRHEA: 0
FACIAL SWELLING: 1
CHILLS: 0
SINUS PRESSURE: 0
CARDIOVASCULAR NEGATIVE: 1
FEVER: 0
PALPITATIONS: 0
NAUSEA: 0
WHEEZING: 0
SINUS PAIN: 0
FATIGUE: 0
SORE THROAT: 0
DIARRHEA: 0

## 2024-05-18 ASSESSMENT — PAIN SCALES - GENERAL: PAINLEVEL: SEVERE PAIN (6)

## 2024-05-18 NOTE — PROGRESS NOTES
Subjective   Ed is a 64 year old, presenting for the following health issues:  Facial Swelling (Patient seen in clinic for left cheek swelling. He states this started yesterday. Patient states that this has happened 6 times in the past 3 years.)    HPI   Concern -  facial swelling  Onset: today.  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  Description: Developed L sided facial swelling and tenderness which started this morning.    Intensity: moderate  Progression of Symptoms:  worsening  Accompanying Signs & Symptoms: No URI symptoms or fevers.  No trauma or injuries.  He is still making saliva.  No HA, visual disturbances, dizziness, one sided weakness or slurred speech.    Previous history of similar problem: Yes  Precipitating factors:        Worsened by: none  Alleviating factors:        Improved by: none  Therapies tried and outcome: rest,fluids with minimal relief    Patient Active Problem List   Diagnosis    Erectile Dysfunction    Overweight (BMI 25.0-29.9)    Hyperlipidemia LDL goal <100    Essential hypertension with goal blood pressure less than 140/90    Impaired fasting glucose    High triglycerides    Prostatic nodule    Primary insomnia    Skin cyst    Elevated ferritin    Keloid scar, right neck    Fall    Major depressive disorder, recurrent episode (H24)    Slac (scapholunate advanced collapse) of wrist, left    Alcohol dependence (H)    Lumbar radiculitis     Current Outpatient Medications   Medication Sig Dispense Refill    lisinopril-hydrochlorothiazide (ZESTORETIC) 20-25 MG tablet Take 1 tablet by mouth daily for blood pressure. 30 tablet 0    Multiple Vitamin (DAILY VITAMINS PO) Take 1 tablet by mouth daily       potassium chloride ER (KLOR-CON M) 20 MEQ CR tablet Take 1 tablet (20 mEq) by mouth daily 30 tablet 5    simvastatin (ZOCOR) 40 MG tablet TAKE 1 TABLET(40 MG) BY MOUTH EVERY EVENING FOR CHOLESTEROL 90 tablet 1    traZODone (DESYREL) 50 MG tablet  TAKE 1 TABLET(50 MG) BY MOUTH AT BEDTIME FOR SLEEP 30 tablet 1     No current facility-administered medications for this visit.      No Known Allergies    Review of Systems   Constitutional:  Negative for chills, fatigue and fever.   HENT:  Positive for facial swelling. Negative for congestion, ear pain, rhinorrhea, sinus pressure, sinus pain and sore throat.    Respiratory:  Negative for cough, shortness of breath and wheezing.    Cardiovascular: Negative.  Negative for chest pain, palpitations and leg swelling.   Gastrointestinal:  Negative for diarrhea, nausea and vomiting.   All other systems reviewed and are negative.          Objective    /74 (BP Location: Left arm, Patient Position: Sitting, Cuff Size: Adult Large)   Pulse 95   Temp 97.4  F (36.3  C) (Tympanic)   Resp 20   Wt 87.9 kg (193 lb 12.8 oz)   SpO2 95%   BMI 27.81 kg/m    Body mass index is 27.81 kg/m .  Physical Exam  Vitals and nursing note reviewed.   Constitutional:       General: He is not in acute distress.     Appearance: Normal appearance. He is normal weight. He is not ill-appearing.   HENT:      Head: Normocephalic and atraumatic.      Jaw: No tenderness, swelling or pain on movement.      Salivary Glands: Right salivary gland is not diffusely enlarged or tender. Left salivary gland is diffusely enlarged and tender.      Ears:      Comments: TMs are intact without any erythema or bulging bilaterally.  Airway is patent.     Nose: Nose normal.      Mouth/Throat:      Lips: Pink.      Mouth: Mucous membranes are moist.      Pharynx: Oropharynx is clear. Uvula midline. No pharyngeal swelling, oropharyngeal exudate, posterior oropharyngeal erythema or uvula swelling.      Tonsils: No tonsillar exudate or tonsillar abscesses.   Eyes:      General: No scleral icterus.     Extraocular Movements: Extraocular movements intact.      Conjunctiva/sclera: Conjunctivae normal.      Pupils: Pupils are equal, round, and reactive to light.    Neck:      Thyroid: No thyromegaly.   Cardiovascular:      Rate and Rhythm: Normal rate and regular rhythm.      Pulses: Normal pulses.      Heart sounds: Normal heart sounds, S1 normal and S2 normal. No murmur heard.     No friction rub. No gallop.   Pulmonary:      Effort: Pulmonary effort is normal. No tachypnea, accessory muscle usage, respiratory distress or retractions.      Breath sounds: Normal breath sounds and air entry. No stridor. No decreased breath sounds, wheezing, rhonchi or rales.   Musculoskeletal:      Cervical back: Normal range of motion and neck supple.   Lymphadenopathy:      Cervical: No cervical adenopathy.   Skin:     General: Skin is warm and dry.      Findings: No rash.   Neurological:      Mental Status: He is alert and oriented to person, place, and time.   Psychiatric:         Mood and Affect: Mood normal.         Behavior: Behavior normal.         Thought Content: Thought content normal.         Judgment: Judgment normal.              Assessment/Plan:  Acute parotitis:  Will treat with [id number] and take with food/probiotics to minimize GI upset.  Recommend tylenol/ibuprofen prn pain/fever and cool compresses.   Rest, fluids, chicken soup.  Recheck in clinic if symptoms worsen or if symptoms do not improve.  Will also send to ENT as this appears to be a chronic issue.  -     amoxicillin-clavulanate (AUGMENTIN) 875-125 MG tablet; Take 1 tablet by mouth 2 times daily for 10 days  -     Adult ENT  Referral; Future        Deb See SUZANNE Edge

## 2024-06-05 DIAGNOSIS — I10 ESSENTIAL HYPERTENSION WITH GOAL BLOOD PRESSURE LESS THAN 140/90: ICD-10-CM

## 2024-06-06 RX ORDER — LISINOPRIL AND HYDROCHLOROTHIAZIDE 20; 25 MG/1; MG/1
1 TABLET ORAL DAILY
Qty: 30 TABLET | Refills: 0 | Status: SHIPPED | OUTPATIENT
Start: 2024-06-06 | End: 2024-08-12

## 2024-06-06 NOTE — TELEPHONE ENCOUNTER
Let patient know he needs to be seen for further refills after this. (I have not seen him since 2020, and he has not had labs done for over 1 year)

## 2024-06-22 ENCOUNTER — HEALTH MAINTENANCE LETTER (OUTPATIENT)
Age: 64
End: 2024-06-22

## 2024-07-03 NOTE — PROGRESS NOTES
Assessment & Plan     Benign exam.  Probably a submandibular gland but I'm not sure if that totally explains his tongue swelling.  Will check US first and get allergy on the books for potential angioedema work up, though would expect swelling to be more b/l if that were the case.   ED precautions discussed      Problem List Items Addressed This Visit    None  Visit Diagnoses       Facial swelling    -  Primary    Relevant Orders    US Head Neck Soft Tissue    Lip swelling        Relevant Orders    Adult Allergy/Asthma  Referral             Review of external notes as documented elsewhere in note    28 minutes spent on the date of the encounter doing chart review, history and exam, documentation and further activities per the note  {     SILVER Davis  [facility]    Subjective     HPI         Recurrent LEFT sided facial swelling- will start with swelling in upper lip then spread to left cheek and along jaw. Has happened 6 times in the past 3 years.  Usually responds to Augmentin.  Dentist doesn't think it is odontogenic.  Sometimes there  will be pain, sometimes not. Last episode was last month and started to swell on the right as well but this was self resolving.   Tongue does not swelling, no issues with breathing or swallowing when episodes occur. Has been on lisinopril for years.  No hx head/neck surgery or radiation treatments      .    5/18 UC:  Concern -  facial swelling  Onset: today.  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  Description: Developed L sided facial swelling and tenderness which started this morning.    Intensity: moderate  Progression of Symptoms:  worsening  Accompanying Signs & Symptoms: No URI symptoms or fevers.  No trauma or injuries.  He is still making saliva.  No HA, visual disturbances, dizziness, one sided weakness or slurred speech.        Review of Systems   ENT as above      Objective    There  were no vitals taken for this visit.    Physical Exam     Constitutional:   The patient was in no acute distress.      Head/Face:   Normocephalic and atraumatic.  No lesions or scars.     Ears:  The tympanic membranes are normal in appearance, bony landmarks are intact.  No retraction, perforation, or masses.   No fluid or purulence was seen in the external canal or the middle ear. No evidence of infection of the middle ear or external canal, cerumen was normal in appearance.    Nose:  Anterior rhinoscopy revealed midline septum and absence of purulence or polyps.      Mouth:  Normal tongue, floor of mouth, buccal mucosa, and palate.  No lesions, ulceration or  masses on inspection, normal voice quality .   No tenderness or masses to floor of mouth or buccal mucosa.    Mucosa well hydrated.        Oropharynx:  Normal mucosa, palate symmetric with normal elevation. Tonsils  1+      Neck:  Supple with normal laryngeal and tracheal landmarks. No palpable thyroid.     Lymphatic:  There is no palpable lymphadenopathy in the neck.

## 2024-07-19 ENCOUNTER — OFFICE VISIT (OUTPATIENT)
Dept: OTOLARYNGOLOGY | Facility: CLINIC | Age: 64
End: 2024-07-19

## 2024-07-19 DIAGNOSIS — R22.0 LIP SWELLING: ICD-10-CM

## 2024-07-19 DIAGNOSIS — R22.0 FACIAL SWELLING: Primary | ICD-10-CM

## 2024-07-19 PROCEDURE — 99203 OFFICE O/P NEW LOW 30 MIN: CPT | Performed by: PHYSICIAN ASSISTANT

## 2024-07-19 NOTE — LETTER
7/19/2024      Ed Rudolph  [address]  [place] [address]      Dear Colleague,    Thank you for referring your patient, Ed Rudolph, to the [facility]. Please see a copy of my visit note below.    Assessment & Plan    Benign exam.  Probably a submandibular gland but I'm not sure if that totally explains his tongue swelling.  Will check US first and get allergy on the books for potential angioedema work up, though would expect swelling to be more b/l if that were the case.   ED precautions discussed      Problem List Items Addressed This Visit    None  Visit Diagnoses       Facial swelling    -  Primary    Relevant Orders    US Head Neck Soft Tissue    Lip swelling        Relevant Orders    Adult Allergy/Asthma  Referral             Review of external notes as documented elsewhere in note    28 minutes spent on the date of the encounter doing chart review, history and exam, documentation and further activities per the note  {     SILVER Davis  [facility]    Subjective     HPI         Recurrent LEFT sided facial swelling- will start with swelling in upper lip then spread to left cheek and along jaw. Has happened 6 times in the past 3 years.  Usually responds to Augmentin.  Dentist doesn't think it is odontogenic.  Sometimes there  will be pain, sometimes not. Last episode was last month and started to swell on the right as well but this was self resolving.   Tongue does not swelling, no issues with breathing or swallowing when episodes occur. Has been on lisinopril for years.  No hx head/neck surgery or radiation treatments      .    5/18 UC:  Concern -  facial swelling  Onset: today.  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  Description: Developed L sided facial swelling and tenderness which started this morning.    Intensity: moderate  Progression of Symptoms:   worsening  Accompanying Signs & Symptoms: No URI symptoms or fevers.  No trauma or injuries.  He is still making saliva.  No HA, visual disturbances, dizziness, one sided weakness or slurred speech.        Review of Systems   ENT as above      Objective    There were no vitals taken for this visit.    Physical Exam     Constitutional:   The patient was in no acute distress.      Head/Face:   Normocephalic and atraumatic.  No lesions or scars.     Ears:  The tympanic membranes are normal in appearance, bony landmarks are intact.  No retraction, perforation, or masses.   No fluid or purulence was seen in the external canal or the middle ear. No evidence of infection of the middle ear or external canal, cerumen was normal in appearance.    Nose:  Anterior rhinoscopy revealed midline septum and absence of purulence or polyps.      Mouth:  Normal tongue, floor of mouth, buccal mucosa, and palate.  No lesions, ulceration or  masses on inspection, normal voice quality .   No tenderness or masses to floor of mouth or buccal mucosa.    Mucosa well hydrated.        Oropharynx:  Normal mucosa, palate symmetric with normal elevation. Tonsils  1+      Neck:  Supple with normal laryngeal and tracheal landmarks. No palpable thyroid.     Lymphatic:  There is no palpable lymphadenopathy in the neck.                     Again, thank you for allowing me to participate in the care of your patient.        Sincerely,        SILVER Davis

## 2024-07-24 ENCOUNTER — ANCILLARY PROCEDURE (OUTPATIENT)
Dept: ULTRASOUND IMAGING | Facility: CLINIC | Age: 64
End: 2024-07-24
Attending: PHYSICIAN ASSISTANT

## 2024-07-24 ENCOUNTER — TELEPHONE (OUTPATIENT)
Dept: OTOLARYNGOLOGY | Facility: CLINIC | Age: 64
End: 2024-07-24

## 2024-07-24 DIAGNOSIS — R22.0 FACIAL SWELLING: ICD-10-CM

## 2024-07-24 PROCEDURE — 76536 US EXAM OF HEAD AND NECK: CPT | Mod: TC | Performed by: RADIOLOGY

## 2024-07-24 NOTE — TELEPHONE ENCOUNTER
Spoke with patient and gave him Nicolas' message. He will stop lisinopril and wait to hear from Dr. Garcia about BP meds. He plans to keep his upcoming allergy appointment. Advised pt to seek immediate medical care ER/911 if he develops tongue/ throat swelling, difficulty breathing. Pt verbalized understanding.   Sintia Chin RN on 7/24/2024 at 3:15 PM

## 2024-07-24 NOTE — TELEPHONE ENCOUNTER
ENT Team:    Please contact patient. There was a tiny cyst in the left parotid which does not explain his symptoms.   I showed his photo to Dr Elena who was also concerned about angioedema.  I am glad he has allergy appt upcoming.  He needs to stop his lisinopril and follow up with his primary ( who I have cc'd) for an alternative.      Nicolas Vallejo PA-C

## 2024-08-04 DIAGNOSIS — I10 ESSENTIAL HYPERTENSION WITH GOAL BLOOD PRESSURE LESS THAN 140/90: ICD-10-CM

## 2024-08-12 RX ORDER — LISINOPRIL AND HYDROCHLOROTHIAZIDE 20; 25 MG/1; MG/1
1 TABLET ORAL DAILY
Qty: 30 TABLET | Refills: 0 | Status: SHIPPED | OUTPATIENT
Start: 2024-08-12 | End: 2024-08-29

## 2024-08-29 ENCOUNTER — OFFICE VISIT (OUTPATIENT)
Dept: FAMILY MEDICINE | Facility: CLINIC | Age: 64
End: 2024-08-29

## 2024-08-29 VITALS
SYSTOLIC BLOOD PRESSURE: 152 MMHG | BODY MASS INDEX: 26.31 KG/M2 | RESPIRATION RATE: 20 BRPM | TEMPERATURE: 98.7 F | WEIGHT: 183.8 LBS | HEIGHT: 70 IN | HEART RATE: 84 BPM | DIASTOLIC BLOOD PRESSURE: 88 MMHG | OXYGEN SATURATION: 95 %

## 2024-08-29 DIAGNOSIS — E87.6 HYPOKALEMIA: ICD-10-CM

## 2024-08-29 DIAGNOSIS — E78.5 HYPERLIPIDEMIA LDL GOAL <100: ICD-10-CM

## 2024-08-29 DIAGNOSIS — R73.01 IMPAIRED FASTING GLUCOSE: ICD-10-CM

## 2024-08-29 DIAGNOSIS — Z12.11 SCREEN FOR COLON CANCER: ICD-10-CM

## 2024-08-29 DIAGNOSIS — Z23 NEED FOR VACCINATION: ICD-10-CM

## 2024-08-29 DIAGNOSIS — I10 ESSENTIAL HYPERTENSION WITH GOAL BLOOD PRESSURE LESS THAN 140/90: Primary | ICD-10-CM

## 2024-08-29 LAB
ALBUMIN SERPL BCG-MCNC: 4.3 G/DL (ref 3.5–5.2)
ALP SERPL-CCNC: 138 U/L (ref 40–150)
ALT SERPL W P-5'-P-CCNC: 34 U/L (ref 0–70)
ANION GAP SERPL CALCULATED.3IONS-SCNC: 14 MMOL/L (ref 7–15)
AST SERPL W P-5'-P-CCNC: 107 U/L (ref 0–45)
BILIRUB DIRECT SERPL-MCNC: 0.3 MG/DL (ref 0–0.3)
BILIRUB SERPL-MCNC: 0.7 MG/DL
BUN SERPL-MCNC: 8.9 MG/DL (ref 8–23)
CALCIUM SERPL-MCNC: 9.5 MG/DL (ref 8.8–10.4)
CHLORIDE SERPL-SCNC: 106 MMOL/L (ref 98–107)
CHOLEST SERPL-MCNC: 169 MG/DL
CREAT SERPL-MCNC: 0.76 MG/DL (ref 0.67–1.17)
EGFRCR SERPLBLD CKD-EPI 2021: >90 ML/MIN/1.73M2
FASTING STATUS PATIENT QL REPORTED: YES
FASTING STATUS PATIENT QL REPORTED: YES
GLUCOSE SERPL-MCNC: 101 MG/DL (ref 70–99)
HBA1C MFR BLD: 4.9 % (ref 0–5.6)
HCO3 SERPL-SCNC: 23 MMOL/L (ref 22–29)
HDLC SERPL-MCNC: 43 MG/DL
LDLC SERPL CALC-MCNC: 94 MG/DL
NONHDLC SERPL-MCNC: 126 MG/DL
POTASSIUM SERPL-SCNC: 3.3 MMOL/L (ref 3.4–5.3)
PROT SERPL-MCNC: 7.7 G/DL (ref 6.4–8.3)
SODIUM SERPL-SCNC: 143 MMOL/L (ref 135–145)
TRIGL SERPL-MCNC: 162 MG/DL

## 2024-08-29 PROCEDURE — 90677 PCV20 VACCINE IM: CPT | Performed by: FAMILY MEDICINE

## 2024-08-29 PROCEDURE — 90471 IMMUNIZATION ADMIN: CPT | Performed by: FAMILY MEDICINE

## 2024-08-29 PROCEDURE — 90472 IMMUNIZATION ADMIN EACH ADD: CPT | Performed by: FAMILY MEDICINE

## 2024-08-29 PROCEDURE — 82248 BILIRUBIN DIRECT: CPT | Performed by: FAMILY MEDICINE

## 2024-08-29 PROCEDURE — 90750 HZV VACC RECOMBINANT IM: CPT | Performed by: FAMILY MEDICINE

## 2024-08-29 PROCEDURE — 36415 COLL VENOUS BLD VENIPUNCTURE: CPT | Performed by: FAMILY MEDICINE

## 2024-08-29 PROCEDURE — 80061 LIPID PANEL: CPT | Performed by: FAMILY MEDICINE

## 2024-08-29 PROCEDURE — 80053 COMPREHEN METABOLIC PANEL: CPT | Performed by: FAMILY MEDICINE

## 2024-08-29 PROCEDURE — 99214 OFFICE O/P EST MOD 30 MIN: CPT | Mod: 25 | Performed by: FAMILY MEDICINE

## 2024-08-29 PROCEDURE — 90678 RSV VACC PREF BIVALENT IM: CPT | Performed by: FAMILY MEDICINE

## 2024-08-29 PROCEDURE — 83036 HEMOGLOBIN GLYCOSYLATED A1C: CPT | Performed by: FAMILY MEDICINE

## 2024-08-29 RX ORDER — CHLORTHALIDONE 25 MG/1
25 TABLET ORAL DAILY
Qty: 90 TABLET | Refills: 1 | Status: SHIPPED | OUTPATIENT
Start: 2024-08-29

## 2024-08-29 RX ORDER — AMLODIPINE BESYLATE 5 MG/1
5 TABLET ORAL DAILY
Qty: 90 TABLET | Refills: 1 | Status: SHIPPED | OUTPATIENT
Start: 2024-08-29

## 2024-08-29 RX ORDER — POTASSIUM CHLORIDE 1500 MG/1
20 TABLET, EXTENDED RELEASE ORAL DAILY
Qty: 90 TABLET | Refills: 1 | Status: SHIPPED | OUTPATIENT
Start: 2024-08-29

## 2024-08-29 RX ORDER — ATORVASTATIN CALCIUM 40 MG/1
40 TABLET, FILM COATED ORAL DAILY
Qty: 90 TABLET | Refills: 1 | Status: SHIPPED | OUTPATIENT
Start: 2024-08-29

## 2024-08-29 ASSESSMENT — PATIENT HEALTH QUESTIONNAIRE - PHQ9
SUM OF ALL RESPONSES TO PHQ QUESTIONS 1-9: 1
SUM OF ALL RESPONSES TO PHQ QUESTIONS 1-9: 1
10. IF YOU CHECKED OFF ANY PROBLEMS, HOW DIFFICULT HAVE THESE PROBLEMS MADE IT FOR YOU TO DO YOUR WORK, TAKE CARE OF THINGS AT HOME, OR GET ALONG WITH OTHER PEOPLE: NOT DIFFICULT AT ALL

## 2024-08-29 ASSESSMENT — PAIN SCALES - GENERAL: PAINLEVEL: NO PAIN (0)

## 2024-08-29 NOTE — Clinical Note
PAULETTE Neal I discussed unit(s)/s report with pt today. I have a suspicion that he is experiencing facial angioedema, perhaps from his lisinopril, so I am going to schange it to something else.  Aris

## 2024-08-29 NOTE — RESULT ENCOUNTER NOTE
Your potassium is still low, if you are taking 20 mEq of potassium tablets increased to 40 mg daily    If you are not taking potassium supplement take 20 mEq daily as prescribed    Repeat BMP labs in 2 to 3 weeks

## 2024-08-29 NOTE — NURSING NOTE
Prior to immunization administration, verified patients identity using patient s name and date of birth. Please see Immunization Activity for additional information.     Screening Questionnaire for Adult Immunization    Are you sick today?   No   Do you have allergies to medications, food, a vaccine component or latex?   No   Have you ever had a serious reaction after receiving a vaccination?   No   Do you have a long-term health problem with heart, lung, kidney, or metabolic disease (e.g., diabetes), asthma, a blood disorder, no spleen, complement component deficiency, a cochlear implant, or a spinal fluid leak?  Are you on long-term aspirin therapy?   Yes   Do you have cancer, leukemia, HIV/AIDS, or any other immune system problem?   No   Do you have a parent, brother, or sister with an immune system problem?   No   In the past 3 months, have you taken medications that affect  your immune system, such as prednisone, other steroids, or anticancer drugs; drugs for the treatment of rheumatoid arthritis, Crohn s disease, or psoriasis; or have you had radiation treatments?   No   Have you had a seizure, or a brain or other nervous system problem?   No   During the past year, have you received a transfusion of blood or blood    products, or been given immune (gamma) globulin or antiviral drug?   No   For women: Are you pregnant or is there a chance you could become       pregnant during the next month?   No   Have you received any vaccinations in the past 4 weeks?   No     Patient instructed to remain in clinic for 15 minutes afterwards, and to report any adverse reactions.     Screening performed by Lilia Mcdonald MA on 8/29/2024 at 9:04 AM.

## 2024-08-29 NOTE — PROGRESS NOTES
Assessment & Plan     (I10) Essential hypertension with goal blood pressure less than 140/90  (primary encounter diagnosis)  Comment: Uncontrolled  Plan: Basic metabolic panel, amLODIPine (NORVASC) 5         MG tablet, chlorthalidone (HYGROTON) 25 MG         tablet, potassium chloride doyle ER (KLOR-CON         M20) 20 MEQ CR tablet        Discontinue lisinopril component.  Change hydrochlorothiazide to chlorthalidone.  Add amlodipine to make up for the absence of the lisinopril. Return in about 5 months (around 1/29/2025) for full physical, cholesterol, blood pressure check.  He should come in sooner if he notes that his blood pressures are too high or too low after about 2 weeks from now.    (E87.6) Hypokalemia  Comment: His last potassium level was normal  Plan: Basic metabolic panel, potassium chloride doyle         ER (KLOR-CON M20) 20 MEQ CR tablet            (E78.5) Hyperlipidemia LDL goal <100  Comment: Now that we have a stricter treatment goal, I am putting him on a high-intensity statin   Plan: Hepatic function panel, Lipid panel reflex to         direct LDL Non-fasting, atorvastatin (LIPITOR)         40 MG tablet        Stop simvastatin.  Start atorvastatin. Return in about 5 months (around 1/29/2025) for full physical, cholesterol, blood pressure check.      (R73.01) Impaired fasting glucose  Comment: He has had some mild increases over the past couple years  Plan: Basic metabolic panel, Hemoglobin A1c        Follow-up as needed    (Z12.11) Screen for colon cancer  Comment:   Plan: Colonoscopy Screening  Referral            (Z23) Need for vaccination  Comment:   Plan: Pneumococcal 20 Valent Conjugate (Prevnar 20),         ZOSTER RECOMBINANT ADJUVANTED (SHINGRIX), RSV         VACCINE (ABRYSVO)                    Brian Wrad is a 64 year old, presenting for the following health issues:  Hypertension and Lipids        8/29/2024     7:52 AM   Additional Questions   Roomed by Nicholas  "    History of Present Illness       Reason for visit:  Follow up / questions about NTE  results    He eats 0-1 servings of fruits and vegetables daily.He consumes 1 sweetened beverage(s) daily.He exercises with enough effort to increase his heart rate 10 to 19 minutes per day.  He exercises with enough effort to increase his heart rate 3 or less days per week.   He is taking medications regularly.         Hyperlipidemia Follow-Up    Are you regularly taking any medication or supplement to lower your cholesterol?   Yes-    Are you having muscle aches or other side effects that you think could be caused by your cholesterol lowering medication?  No    Hypertension Follow-up    Do you check your blood pressure regularly outside of the clinic? No   Are you following a low salt diet? Yes  Are your blood pressures ever more than 140 on the top number (systolic) OR more   than 90 on the bottom number (diastolic), for example 140/90? Yes            Objective    BP (!) 152/88 (BP Location: Left arm, Patient Position: Sitting, Cuff Size: Adult Regular)   Pulse 84   Temp 98.7  F (37.1  C) (Temporal)   Resp 20   Ht 1.778 m (5' 10\")   Wt 83.4 kg (183 lb 12.8 oz)   SpO2 95%   BMI 26.37 kg/m    Body mass index is 26.37 kg/m .  Physical Exam   GENERAL: healthy, alert and no distress  EYES: Eyes grossly normal to inspection, PERRL, EOMI, sclerae white and conjunctivae normal  MS: no gross musculoskeletal defects noted, no edema  SKIN: no suspicious lesions or rashes to visible skin  NEURO: Normal strength and tone, sensory exam grossly normal, mentation intact, oriented times 3 and cranial nerves 2-12 intact  PSYCH: mentation appears normal, affect normal/bright     Lab on 03/28/2023   Component Date Value Ref Range Status    Vitamin B12 03/28/2023 494  232 - 1,245 pg/mL Final    Folic Acid 03/28/2023 4.6  4.6 - 34.8 ng/mL Final    Sodium 03/28/2023 136  133 - 144 mmol/L Final    Potassium 03/28/2023 4.1  3.4 - 5.3 mmol/L " Final    Chloride 03/28/2023 104  94 - 109 mmol/L Final    Carbon Dioxide (CO2) 03/28/2023 24  20 - 32 mmol/L Final    Anion Gap 03/28/2023 8  3 - 14 mmol/L Final    Urea Nitrogen 03/28/2023 31 (H)  7 - 30 mg/dL Final    Creatinine 03/28/2023 1.08  0.66 - 1.25 mg/dL Final    Calcium 03/28/2023 9.6  8.5 - 10.1 mg/dL Final    Glucose 03/28/2023 105 (H)  70 - 99 mg/dL Final    Alkaline Phosphatase 03/28/2023 113  40 - 150 U/L Final    AST 03/28/2023 101 (H)  0 - 45 U/L Final    ALT 03/28/2023 60  0 - 70 U/L Final    Protein Total 03/28/2023 8.1  6.8 - 8.8 g/dL Final    Albumin 03/28/2023 4.0  3.4 - 5.0 g/dL Final    Bilirubin Total 03/28/2023 0.4  0.2 - 1.3 mg/dL Final    GFR Estimate 03/28/2023 78  >60 mL/min/1.73m2 Final    eGFR calculated using 2021 CKD-EPI equation.    Magnesium 03/28/2023 2.3  1.6 - 2.3 mg/dL Final    Final Diagnosis 03/28/2023    Final                    Value:This result contains rich text formatting which cannot be displayed here.    Peripheral Smear 03/28/2023    Final                    Value:This result contains rich text formatting which cannot be displayed here.    Peripheral Hematologic Data 03/28/2023    Final                    Value:This result contains rich text formatting which cannot be displayed here.    Performing Labs 03/28/2023    Final                    Value:This result contains rich text formatting which cannot be displayed here.    WBC Count 03/28/2023 4.4  4.0 - 11.0 10e3/uL Final    RBC Count 03/28/2023 3.64 (L)  4.40 - 5.90 10e6/uL Final    Hemoglobin 03/28/2023 13.2 (L)  13.3 - 17.7 g/dL Final    Hematocrit 03/28/2023 39.5 (L)  40.0 - 53.0 % Final    MCV 03/28/2023 109 (H)  78 - 100 fL Final    MCH 03/28/2023 36.3 (H)  26.5 - 33.0 pg Final    MCHC 03/28/2023 33.4  31.5 - 36.5 g/dL Final    RDW 03/28/2023 12.9  10.0 - 15.0 % Final    Platelet Count 03/28/2023 221  150 - 450 10e3/uL Final    % Neutrophils 03/28/2023 40  % Final    % Lymphocytes 03/28/2023 42  % Final    %  Monocytes 03/28/2023 15  % Final    % Eosinophils 03/28/2023 2  % Final    % Basophils 03/28/2023 1  % Final    % Immature Granulocytes 03/28/2023 0  % Final    Absolute Neutrophils 03/28/2023 1.8  1.6 - 8.3 10e3/uL Final    Absolute Lymphocytes 03/28/2023 1.8  0.8 - 5.3 10e3/uL Final    Absolute Monocytes 03/28/2023 0.7  0.0 - 1.3 10e3/uL Final    Absolute Eosinophils 03/28/2023 0.1  0.0 - 0.7 10e3/uL Final    Absolute Basophils 03/28/2023 0.0  0.0 - 0.2 10e3/uL Final    Absolute Immature Granulocytes 03/28/2023 0.0  <=0.4 10e3/uL Final    % Reticulocyte 03/28/2023 1.2  0.5 - 2.0 % Final    Absolute Reticulocyte 03/28/2023 0.042  0.025 - 0.095 10e6/uL Final           Signed Electronically by: Aris Garcia MD

## 2024-08-30 ENCOUNTER — TELEPHONE (OUTPATIENT)
Dept: OTHER | Facility: CLINIC | Age: 64
End: 2024-08-30

## 2024-08-30 DIAGNOSIS — E87.6 HYPOKALEMIA: Primary | ICD-10-CM

## 2024-08-30 NOTE — TELEPHONE ENCOUNTER
LM for patient to call us back to schedule:    Non-fasting lab in 3 weeks- will send pt a [employer] message with results      Appt note:  lab only appt

## 2024-08-30 NOTE — TELEPHONE ENCOUNTER
Routing to scheduling to coordinate the following:  Non-fasting lab in 3 weeks- will send pt a [facility] message with results     Appt note:  lab only appt    Ary Jordan RN on 8/30/2024 at 8:19 AM

## 2024-09-03 NOTE — TELEPHONE ENCOUNTER
Left voicemail for patient to schedule appointment(s), stated this our 2nd attempt at scheduling and provided [facility] telephone number for patient to call back to schedule.

## 2024-12-26 ENCOUNTER — TELEPHONE (OUTPATIENT)
Dept: FAMILY MEDICINE | Facility: CLINIC | Age: 64
End: 2024-12-26

## 2024-12-26 NOTE — TELEPHONE ENCOUNTER
Patient Quality Outreach    Patient is due for the following:   Physical Preventive Adult Physical    Action(s) Taken:   Schedule a Adult Preventative    Type of outreach:    Sent [email or web address] message.    Questions for provider review:    None           Johanny Smart MA

## 2025-01-20 DIAGNOSIS — F33.0 MAJOR DEPRESSIVE DISORDER, RECURRENT EPISODE, MILD: ICD-10-CM

## 2025-01-20 DIAGNOSIS — F51.01 PRIMARY INSOMNIA: ICD-10-CM

## 2025-01-23 RX ORDER — TRAZODONE HYDROCHLORIDE 50 MG/1
50 TABLET, FILM COATED ORAL
Qty: 30 TABLET | Refills: 0 | Status: SHIPPED | OUTPATIENT
Start: 2025-01-23

## 2025-02-22 DIAGNOSIS — F33.0 MAJOR DEPRESSIVE DISORDER, RECURRENT EPISODE, MILD: ICD-10-CM

## 2025-02-22 DIAGNOSIS — F51.01 PRIMARY INSOMNIA: ICD-10-CM

## 2025-02-24 RX ORDER — TRAZODONE HYDROCHLORIDE 50 MG/1
50 TABLET ORAL
Qty: 90 TABLET | Refills: 1 | Status: SHIPPED | OUTPATIENT
Start: 2025-02-24

## 2025-03-27 ENCOUNTER — TELEPHONE (OUTPATIENT)
Dept: FAMILY MEDICINE | Facility: CLINIC | Age: 65
End: 2025-03-27

## 2025-03-27 NOTE — TELEPHONE ENCOUNTER
Patient Quality Outreach    Patient is due for the following:   Physical Preventive Adult Physical    Action(s) Taken:   Schedule a Adult Preventative    Type of outreach:    Sent [employer] message.    Questions for provider review:    None         Johanny Smart MA     Statement Selected

## 2025-07-12 ENCOUNTER — HEALTH MAINTENANCE LETTER (OUTPATIENT)
Age: 65
End: 2025-07-12

## 2025-08-07 DIAGNOSIS — I10 ESSENTIAL HYPERTENSION WITH GOAL BLOOD PRESSURE LESS THAN 140/90: ICD-10-CM

## 2025-08-14 RX ORDER — AMLODIPINE BESYLATE 5 MG/1
TABLET ORAL
Qty: 90 TABLET | Refills: 1 | Status: SHIPPED | OUTPATIENT
Start: 2025-08-14

## 2025-08-14 RX ORDER — CHLORTHALIDONE 25 MG/1
TABLET ORAL
Qty: 90 TABLET | Refills: 1 | Status: SHIPPED | OUTPATIENT
Start: 2025-08-14

## (undated) DEVICE — SOL WATER IRRIG 1000ML BOTTLE 07139-09

## (undated) DEVICE — GLOVE PROTEXIS W/NEU-THERA 7.0  2D73TE70

## (undated) DEVICE — TRAY PAIN INJECTION 97A 640

## (undated) DEVICE — NDL SPINAL 22GA 3.5" QUINCKE 405181

## (undated) DEVICE — PREP CHLORAPREP W/ORANGE TINT 10.5ML 930715

## (undated) RX ORDER — SIMETHICONE 40MG/0.6ML
SUSPENSION, DROPS(FINAL DOSAGE FORM)(ML) ORAL
Status: DISPENSED
Start: 2018-04-12

## (undated) RX ORDER — IOPAMIDOL 408 MG/ML
INJECTION, SOLUTION INTRATHECAL
Status: DISPENSED
Start: 2021-10-15

## (undated) RX ORDER — DEXAMETHASONE SODIUM PHOSPHATE 10 MG/ML
INJECTION, SOLUTION INTRAMUSCULAR; INTRAVENOUS
Status: DISPENSED
Start: 2021-10-15

## (undated) RX ORDER — FENTANYL CITRATE 50 UG/ML
INJECTION, SOLUTION INTRAMUSCULAR; INTRAVENOUS
Status: DISPENSED
Start: 2018-04-12

## (undated) RX ORDER — LIDOCAINE HYDROCHLORIDE 10 MG/ML
INJECTION, SOLUTION EPIDURAL; INFILTRATION; INTRACAUDAL; PERINEURAL
Status: DISPENSED
Start: 2021-10-15